# Patient Record
Sex: FEMALE | Race: WHITE | Employment: UNEMPLOYED | URBAN - METROPOLITAN AREA
[De-identification: names, ages, dates, MRNs, and addresses within clinical notes are randomized per-mention and may not be internally consistent; named-entity substitution may affect disease eponyms.]

---

## 2017-01-12 ENCOUNTER — OFFICE VISIT (OUTPATIENT)
Dept: FAMILY MEDICINE CLINIC | Age: 57
End: 2017-01-12

## 2017-01-12 VITALS
TEMPERATURE: 98.3 F | OXYGEN SATURATION: 98 % | BODY MASS INDEX: 17.49 KG/M2 | DIASTOLIC BLOOD PRESSURE: 88 MMHG | HEIGHT: 65 IN | SYSTOLIC BLOOD PRESSURE: 128 MMHG | WEIGHT: 105 LBS | HEART RATE: 76 BPM

## 2017-01-12 DIAGNOSIS — F41.1 GAD (GENERALIZED ANXIETY DISORDER): Primary | ICD-10-CM

## 2017-01-12 NOTE — PROGRESS NOTES
Coordination of Care  1. Have you been to the ER, urgent care clinic since your last visit? Hospitalized since your last visit? No    2. Have you seen or consulted any other health care providers outside of the [facility] since your last visit? Include any pap smears or colon screening. No    Medications  Medication Reconciliation Performed: yes  Patient does need refills                                                                                                                                                                                                                                   Learning Assessment Complete?  yes

## 2017-01-12 NOTE — PROGRESS NOTES
Subjective:      Kellee Leon is a 64 y.o. female who presents for follow up of of depression and anxiety. She complains of weight loss and psychomotor agitation, not really changed since the last visit. Phil Ryder  Continues to care for brother with Down's/Alzheimers as primary caretaker. Has good support from her  and a friend who is also caretaker. Other stressors include her daughter who has just regained custody of Julee's grandchildren and who [place]ana High thinks is using drugs and not caring for the children. She keeps in close touch with the woman who was foster mother to the kids and obtains a lot of support from her. We discussed again today psychiatry eval for new meds that might help her level of anxiety and going to a yoga class. She does do yoga at home, and refuses to see a psychiatrist, thinks the clonazepam provides all of the med help she needs  Pill count in her bottle today showed there were only about 20 pills left in the bottle she had filled in early jan. She isn't sure if she put the other pills in another container and will check when she gets home.       Problem List:     Patient Active Problem List    Diagnosis Date Noted    Chest pain 05/02/2013    Palpitations 05/02/2013    Tympanic membrane rupture 03/20/2013    Long Q-T syndrome 03/20/2013    WPW (Fazal-Parkinson-White syndrome) 03/20/2013    Panic disorder with agoraphobia 03/20/2013    Generalized anxiety disorder 03/20/2013    Migraine 03/20/2013    Hyperlipidemia 03/20/2013    Osteoporosis 03/20/2013     Medical History:     Past Medical History   Diagnosis Date    History of mammography, screening 2012     Normal    Pap smear for cervical cancer screening several years      Medications:     Current Outpatient Prescriptions   Medication Sig    clonazePAM (KLONOPIN) 0.5 mg tablet 1.5 in the am and 1.5 at hs    nitroglycerin (NITROSTAT) 0.4 mg SL tablet 1 Tab by SubLINGual route every five (5) minutes as needed for Chest Pain. For 2 doses. If cp is unrelieved after second dose call 911.  simvastatin (ZOCOR) 20 mg tablet Take 1 Tab by mouth nightly.  cyclobenzaprine (FLEXERIL) 10 mg tablet TAKE ONE TO ONE AND ONE-HALF TABLET BY MOUTH ONCE DAILY AS NEEDED FOR HEADACHE    albuterol (PROVENTIL HFA, VENTOLIN HFA, PROAIR HFA) 90 mcg/actuation inhaler Take 1 Puff by inhalation every six (6) hours as needed for Wheezing. No current facility-administered medications for this visit. Social History:     Social History     Social History    Marital status: SINGLE     Spouse name: N/A    Number of children: N/A    Years of education: N/A     Social History Main Topics    Smoking status: Current Every Day Smoker     Packs/day: 0.75     Years: 25.00     Types: Cigarettes    Smokeless tobacco: Never Used      Comment: down to 8 a day    Alcohol use No    Drug use: No    Sexual activity: Not Asked     Other Topics Concern    None     Social History Narrative         Objective:     Visit Vitals    /88 (BP 1 Location: Left arm, BP Patient Position: Sitting)    Pulse 76    Temp 98.3 °F (36.8 °C) (Oral)    Ht 5' 4.57\" (1.64 m)    Wt 105 lb (47.6 kg)    SpO2 98%    BMI 17.71 kg/m2        General:  alert, cooperative, mild distress, appears stated age   Thyroid: **nl     Neuro: grosely nonfocal   Mini Mental Status:  ND   Affect & Behavior:  {tearful at times, no change from usual        Assessment/ Plan   Anxiety with depression, situational.  I will continue the clonazepam, but I am not going to cover the med if she runs out for an unknown reason. F/u 2 months. We discussed breathing to help the anxiety, getting out of the house for short periods, and considering a yoga or guided imagery class.

## 2017-02-27 RX ORDER — CLONAZEPAM 0.5 MG/1
TABLET ORAL
Qty: 90 TAB | Refills: 2 | Status: CANCELLED | OUTPATIENT
Start: 2017-02-27

## 2017-02-27 NOTE — TELEPHONE ENCOUNTER
Pt called asking for Klonopin refill. She will run out before her appointment on 3/23. She said to tell Dr Wilfredo Arreaga that she is doing really well.

## 2017-02-28 RX ORDER — CLONAZEPAM 0.5 MG/1
TABLET ORAL
Qty: 90 TAB | Refills: 2 | OUTPATIENT
Start: 2017-02-28 | End: 2017-06-08 | Stop reason: SDUPTHER

## 2017-03-09 ENCOUNTER — TELEPHONE (OUTPATIENT)
Dept: FAMILY MEDICINE CLINIC | Age: 57
End: 2017-03-09

## 2017-03-09 NOTE — TELEPHONE ENCOUNTER
Patient called to report she thinks her thyroid is acting up, feels very tired, no energy at all, she thinks she needs her thyroid med restarted again. She has appt 3/23, she refuses to see other provider besides Dr Johnnie Artis and there are no sooner appts available to see Dr Johnnie Artis.

## 2017-03-10 NOTE — TELEPHONE ENCOUNTER
Per Dr Marge James, spoke to patient and asked her to wait until her appt on 3/23 to discuss her energy level/fatigue/thyroid with Dr. Marge James. Also updating Natasha, patient's mobile # is no longer in use, she confirms she only uses H #.

## 2017-03-23 ENCOUNTER — OFFICE VISIT (OUTPATIENT)
Dept: FAMILY MEDICINE CLINIC | Age: 57
End: 2017-03-23

## 2017-03-23 ENCOUNTER — HOSPITAL ENCOUNTER (OUTPATIENT)
Dept: LAB | Age: 57
Discharge: HOME | End: 2017-03-23

## 2017-03-23 VITALS
TEMPERATURE: 97.8 F | SYSTOLIC BLOOD PRESSURE: 143 MMHG | HEART RATE: 84 BPM | WEIGHT: 106 LBS | DIASTOLIC BLOOD PRESSURE: 82 MMHG | BODY MASS INDEX: 17.88 KG/M2

## 2017-03-23 DIAGNOSIS — E78.00 HYPERCHOLESTEROLEMIA: ICD-10-CM

## 2017-03-23 LAB — TSH SERPL DL<=0.05 MIU/L-ACNC: 4.57 UIU/ML (ref 0.36–3.74)

## 2017-03-23 PROCEDURE — 84443 ASSAY THYROID STIM HORMONE: CPT | Performed by: FAMILY MEDICINE

## 2017-03-23 RX ORDER — ALBUTEROL SULFATE 90 UG/1
1 AEROSOL, METERED RESPIRATORY (INHALATION)
Qty: 1 INHALER | Refills: 1 | Status: SHIPPED | OUTPATIENT
Start: 2017-03-23 | End: 2017-06-19 | Stop reason: SDUPTHER

## 2017-03-23 RX ORDER — CYCLOBENZAPRINE HCL 10 MG
TABLET ORAL
Qty: 30 TAB | Refills: 2 | Status: SHIPPED | OUTPATIENT
Start: 2017-03-23 | End: 2018-02-22 | Stop reason: SDUPTHER

## 2017-03-23 RX ORDER — SIMVASTATIN 20 MG/1
20 TABLET, FILM COATED ORAL
Qty: 30 TAB | Refills: 5 | Status: SHIPPED | OUTPATIENT
Start: 2017-03-23 | End: 2017-11-29 | Stop reason: SDUPTHER

## 2017-03-23 NOTE — PROGRESS NOTES
Coordination of Care  1. Have you been to the ER, urgent care clinic since your last visit? Hospitalized since your last visit? No    2. Have you seen or consulted any other health care providers outside of the [address] [place] [address] since your last visit? Include any pap smears or colon screening. No    Medications  Medication Reconciliation Performed: yes  Patient does need refills     Learning Assessment Complete?  yes

## 2017-03-23 NOTE — PROGRESS NOTES
HISTORY OF PRESENT ILLNESS  Sonia Ellis is a 62 y.o. female. HPI Comments: Feeling sluggish but mood is better. Having more palpitations than usual, stabbing sscp not necessarily exertional.  Wants other ideas on how to quit smoking. Doesn't qualify for PAP programs and has tried nicoretty gum, the patch, wellbutriin. Dad  of lung ca age 58. Can't afford niotine spray or chantix (we checked the cost on goodrx today-- over $300). Never started the zocor after last visit, says she didn't get the message from us that she neede to. Taking meds as directed. Uses flexeril prn h/a. Refuses mammo still. Follow-up         ROS    Physical Exam   Constitutional: She appears well-developed and well-nourished. No distress. Wt about the same. HENT:   Right TM perf unchanged and no masses in middle/inner ear. Neck: No thyromegaly present. Cardiovascular: Normal rate, regular rhythm and normal heart sounds. Exam reveals no gallop and no friction rub. No murmur heard. 1 PAC or PVC while I was listening that she felt as a palpitation. Pulmonary/Chest: Effort normal.   Breasts/axilla without masses. Abdominal: Soft. She exhibits no mass. There is no tenderness. Lymphadenopathy:     She has no cervical adenopathy. EKG-- short DE, borderline long QT, unchanged from previous. ASSESSMENT and PLAN  H/o mild hypothyroidism, recheck tsh.  sscp-- check stress echo. Tobacco abuse-- to try otc lozenges. Hyperlipidemia-- start zocor. Muscular h/a-- flexeril.   Anxiety, still has 2 refills on clonapzepam.

## 2017-03-23 NOTE — MR AVS SNAPSHOT
Visit Information Date & Time Provider Department Dept. Phone Encounter #  
 3/23/2017 10:30 AM Tim Fontanez, [address] [phone] [id number] Upcoming Health Maintenance Date Due Hepatitis C Screening 1960 FOBT Q 1 YEAR AGE 50-75 1/27/2010 BREAST CANCER SCRN MAMMOGRAM 5/14/2014 DTaP/Tdap/Td series (2 - Td) 11/10/2026 Allergies as of 3/23/2017  Review Complete On: 3/23/2017 By: Luan Newman Severity Noted Reaction Type Reactions Bacitracin  05/02/2013    Swelling Bactrim [Sulfamethoprim Ds]  10/01/2015    Rash Ciprofloxacin  03/20/2013   Not Verified Swelling Fosamax [Alendronate]  07/10/2014   Side Effect Nausea Only Imitrex [Sumatriptan Succinate]  03/20/2013   Side Effect Other (comments) Chest pain Keflex [Cephalexin]  10/01/2015    Nausea and Vomiting Pcn [Penicillins]  03/20/2013   Systemic Swelling Trazodone  05/14/2014    Other (comments) Nightmares Current Immunizations  Reviewed on 11/10/2016 Name Date Influenza Vaccine 10/12/2012, 10/10/2011, 11/12/2010 Influenza Vaccine (Quad) PF 11/10/2016, 11/13/2015, 11/14/2014, 11/8/2013 Pneumococcal Polysaccharide (PPSV-23) 11/10/2016 Tdap 11/10/2016 Not reviewed this visit You Were Diagnosed With   
  
 Codes Comments Hypercholesterolemia     ICD-10-CM: E78.00 ICD-9-CM: 272.0 Vitals BP Pulse Temp Weight(growth percentile) BMI OB Status 143/82 (BP 1 Location: Left arm, BP Patient Position: Sitting) 84 97.8 °F (36.6 °C) (Oral) 106 lb (48.1 kg) 17.88 kg/m2 Hysterectomy Smoking Status Current Every Day Smoker Vitals History BMI and BSA Data Body Mass Index Body Surface Area  
 17.88 kg/m 2 1.48 m 2 Preferred Pharmacy Pharmacy Name Phone [facility] [address] Your Updated Medication List  
  
   
 This list is accurate as of: 3/23/17 11:09 AM.  Always use your most recent med list.  
  
  
  
  
 * albuterol 90 mcg/actuation inhaler Commonly known as:  PROVENTIL HFA, VENTOLIN HFA, PROAIR HFA Take 1 Puff by inhalation every six (6) hours as needed for Wheezing. * albuterol 90 mcg/actuation inhaler Commonly known as:  PROVENTIL HFA, VENTOLIN HFA, PROAIR HFA Take 1 Puff by inhalation every six (6) hours as needed for Wheezing. clonazePAM 0.5 mg tablet Commonly known as:  KlonoPIN  
1.5 in the am and 1.5 at hs  
  
 cyclobenzaprine 10 mg tablet Commonly known as:  FLEXERIL  
TAKE ONE TO ONE AND ONE-HALF TABLET BY MOUTH ONCE DAILY AS NEEDED FOR HEADACHE  
  
 nitroglycerin 0.4 mg SL tablet Commonly known as:  NITROSTAT  
1 Tab by SubLINGual route every five (5) minutes as needed for Chest Pain. For 2 doses. If cp is unrelieved after second dose call 911. simvastatin 20 mg tablet Commonly known as:  ZOCOR Take 1 Tab by mouth nightly. * Notice: This list has 2 medication(s) that are the same as other medications prescribed for you. Read the directions carefully, and ask your doctor or other care provider to review them with you. Prescriptions Sent to Pharmacy Refills  
 cyclobenzaprine (FLEXERIL) 10 mg tablet 2 Sig: TAKE ONE TO ONE AND ONE-HALF TABLET BY MOUTH ONCE DAILY AS NEEDED FOR HEADACHE Class: Normal  
 Pharmacy: [address] [place] [address] Ph #: [phone]  
 simvastatin (ZOCOR) 20 mg tablet 5 Sig: Take 1 Tab by mouth nightly. Class: Normal  
 Pharmacy: [address] [place] [address] Ph #: [phone] Route: Oral  
 albuterol (PROVENTIL HFA, VENTOLIN HFA, PROAIR HFA) 90 mcg/actuation inhaler 1 Sig: Take 1 Puff by inhalation every six (6) hours as needed for Wheezing.   
 Class: Normal  
 Pharmacy: [address] Ph #: [phone] Route: Inhalation We Performed the Following AMB POC EKG ROUTINE W/ 12 LEADS, INTER & REP [21672 CPT(R)] Patient Instructions Deciding About Using Medicines To Quit Smoking What are the medicines you can use? Your doctor may prescribe varenicline (Chantix) or bupropion (Zyban). These medicines can help you cope with cravings for tobacco. They are pills that don't contain nicotine. You also can use nicotine replacement products. These do contain nicotine. There are many types. · Gum and lozenges slowly release nicotine into your mouth. · Patches stick to your skin. They slowly release nicotine into your bloodstream. 
· An inhaler has a york that contains nicotine. You breathe in a puff of nicotine vapor through your mouth and throat. · Nasal spray releases a mist that contains nicotine. What are key points about this decision? · Using medicines can double your chances of quitting smoking. They can ease cravings and withdrawal symptoms. · Getting counseling along with using medicine can raise your chances of quitting even more. · If you smoke fewer than 5 cigarettes a day, you may not need medicines to help you quit smoking. · These medicines have less nicotine than cigarettes. And by itself, nicotine is not nearly as harmful as smoking. The tars, carbon monoxide, and other toxic chemicals in tobacco cause the harmful effects. · The side effects of nicotine replacement products depend on the type of product. For example, a patch can make your skin red and itchy. Medicines in pill form can make you sick to your stomach. They can also cause dry mouth and trouble sleeping. For most people, the side effects are not bad enough to make them stop using the products. · FDA warning. The U.[place].  Food and Drug Administration (FDA) warns that people who are taking bupropion or varenicline and who have any serious or unusual changes in mood or behavior or who feel like hurting themselves or someone else should stop taking the medicine and call a doctor right away. If you already have a mood or behavior problem, be sure to tell your doctor before you decide to use these medicines. Why might you choose to use medicines to quit smoking? · You have tried on your own to stop smoking, but you were not able to stop. · You smoke more than 5 cigarettes a day. · You want to increase your chances of quitting smoking. · You want to reduce your cravings and withdrawal symptoms. · You feel the benefits of medicine outweigh the side effects. Why might you choose not to use medicine? · You want to try quitting on your own by stopping all at once (\"cold turkey\"). · You want to cut back slowly on the number of cigarettes you smoke. · You smoke fewer than 5 cigarettes a day. · You do not like using medicine. · You feel the side effects of medicines outweigh the benefits. · You are worried about the cost of medicines. Your decision Thinking about the facts and your feelings can help you make a decision that is right for you. Be sure you understand the benefits and risks of your options, and think about what else you need to do before you make the decision. Where can you learn more? Go to http://vinita-jade.info/. Enter W702 in the search box to learn more about \"Deciding About Using Medicines To Quit Smoking. \" Current as of: May 26, 2016 Content Version: 11.1 © 5042-0296 [employer]. Care instructions adapted under license by [employer] [facility] [employer] (which disclaims liability or warranty for this information). If you have questions about a medical condition or this instruction, always ask your healthcare professional. [address] any warranty or liability for your use of this information. Stopping Smoking: Care Instructions Your Care Instructions Cigarette smokers crave the nicotine in cigarettes. Giving it up is much harder than simply changing a habit. Your body has to stop craving the nicotine. It is hard to quit, but you can do it. There are many tools that people use to quit smoking. You may find that combining tools works best for you. There are several steps to quitting. First you get ready to quit. Then you get support to help you. After that, you learn new skills and behaviors to become a nonsmoker. For many people, a necessary step is getting and using medicine. Your doctor will help you set up the plan that best meets your needs. You may want to attend a smoking cessation program to help you quit smoking. When you choose a program, look for one that has proven success. Ask your doctor for ideas. You will greatly increase your chances of success if you take medicine as well as get counseling or join a cessation program. 
Some of the changes you feel when you first quit tobacco are uncomfortable. Your body will miss the nicotine at first, and you may feel short-tempered and grumpy. You may have trouble sleeping or concentrating. Medicine can help you deal with these symptoms. You may struggle with changing your smoking habits and rituals. The last step is the tricky one: Be prepared for the smoking urge to continue for a time. This is a lot to deal with, but keep at it. You will feel better. Follow-up care is a key part of your treatment and safety. Be sure to make and go to all appointments, and call your doctor if you are having problems. Its also a good idea to know your test results and keep a list of the medicines you take. How can you care for yourself at home? · Ask your family, friends, and coworkers for support. You have a better chance of quitting if you have help and support. · Join a support group, such as Nicotine Anonymous, for people who are trying to quit smoking. · Consider signing up for a smoking cessation program, such as the American Lung Association's Freedom from Smoking program. 
· Set a quit date. Pick your date carefully so that it is not right in the middle of a big deadline or stressful time. Once you quit, do not even take a puff. Get rid of all ashtrays and lighters after your last cigarette. Clean your house and your clothes so that they do not smell of smoke. · Learn how to be a nonsmoker. Think about ways you can avoid those things that make you reach for a cigarette. ¨ Avoid situations that put you at greatest risk for smoking. For some people, it is hard to have a drink with friends without smoking. For others, they might skip a coffee break with coworkers who smoke. ¨ Change your daily routine. Take a different route to work or eat a meal in a different place. · Cut down on stress. Calm yourself or release tension by doing an activity you enjoy, such as reading a book, taking a hot bath, or gardening. · Talk to your doctor or pharmacist about nicotine replacement therapy, which replaces the nicotine in your body. You still get nicotine but you do not use tobacco. Nicotine replacement products help you slowly reduce the amount of nicotine you need. These products come in several forms, many of them available over-the-counter: ¨ Nicotine patches ¨ Nicotine gum and lozenges ¨ Nicotine inhaler · Ask your doctor about bupropion (Wellbutrin) or varenicline (Chantix), which are prescription medicines. They do not contain nicotine. They help you by reducing withdrawal symptoms, such as stress and anxiety. · Some people find hypnosis, acupuncture, and massage helpful for ending the smoking habit. · Eat a healthy diet and get regular exercise. Having healthy habits will help your body move past its craving for nicotine. · Be prepared to keep trying.  Most people are not successful the first few times they try to quit. Do not get mad at yourself if you smoke again. Make a list of things you learned and think about when you want to try again, such as next week, next month, or next year. Where can you learn more? Go to http://vinita-jade.info/. Enter N615 in the search box to learn more about \"Stopping Smoking: Care Instructions. \" Current as of: May 26, 2016 Content Version: 11.1 © 8061-6671 [employer]. Care instructions adapted under license by [employer] (which disclaims liability or warranty for this information). If you have questions about a medical condition or this instruction, always ask your healthcare professional. [address] any warranty or liability for your use of this information. Buy the 4mg nicotine lozenges and use one every 1-2 hours instead of smoking for 6 weeks, then taper off over 6-12 weeks. No more than about 16 lozenges in a day. Introducing [facility]! Shaheen Rosado introduces Locately patient portal. Now you can access parts of your medical record, email your doctor's office, and request medication refills online. 1. In your internet browser, go to [email or web address] 2. Click on the First Time User? Click Here link in the Sign In box. You will see the New Member Sign Up page. 3. Enter your Locately Access Code exactly as it appears below. You will not need to use this code after youve completed the sign-up process. If you do not sign up before the expiration date, you must request a new code. · Locately Access Code: [email or web address] Expires: 6/21/2017 11:08 AM 
 
4. Enter the last four digits of your Social Security Number (xxxx) and Date of Birth (mm/dd/yyyy) as indicated and click Submit. You will be taken to the next sign-up page. 5. Create a [employer] ID.  This will be your Locately login ID and cannot be changed, so think of one that is secure and easy to remember. 6. Create a [email or web address] password. You can change your password at any time. 7. Enter your Password Reset Question and Answer. This can be used at a later time if you forget your password. 8. Enter your e-mail address. You will receive e-mail notification when new information is available in [address] 9. Click Sign Up. You can now view and download portions of your medical record. 10. Click the Download Summary menu link to download a portable copy of your medical information. If you have questions, please visit the Frequently Asked Questions section of the [email or web address] website. Remember, [email or web address] is NOT to be used for urgent needs. For medical emergencies, dial 911. Now available from your iPhone and Android! Please provide this summary of care documentation to your next provider. Your primary care clinician is listed as Dalia Wilson. If you have any questions after today's visit, please call [phone].

## 2017-03-23 NOTE — PATIENT INSTRUCTIONS
Deciding About Using Medicines To Quit Smoking  What are the medicines you can use? Your doctor may prescribe varenicline (Chantix) or bupropion (Zyban). These medicines can help you cope with cravings for tobacco. They are pills that don't contain nicotine. You also can use nicotine replacement products. These do contain nicotine. There are many types. · Gum and lozenges slowly release nicotine into your mouth. · Patches stick to your skin. They slowly release nicotine into your bloodstream.  · An inhaler has a york that contains nicotine. You breathe in a puff of nicotine vapor through your mouth and throat. · Nasal spray releases a mist that contains nicotine. What are key points about this decision? · Using medicines can double your chances of quitting smoking. They can ease cravings and withdrawal symptoms. · Getting counseling along with using medicine can raise your chances of quitting even more. · If you smoke fewer than 5 cigarettes a day, you may not need medicines to help you quit smoking. · These medicines have less nicotine than cigarettes. And by itself, nicotine is not nearly as harmful as smoking. The tars, carbon monoxide, and other toxic chemicals in tobacco cause the harmful effects. · The side effects of nicotine replacement products depend on the type of product. For example, a patch can make your skin red and itchy. Medicines in pill form can make you sick to your stomach. They can also cause dry mouth and trouble sleeping. For most people, the side effects are not bad enough to make them stop using the products. · FDA warning. The U.S. Food and Drug Administration (FDA) warns that people who are taking bupropion or varenicline and who have any serious or unusual changes in mood or behavior or who feel like hurting themselves or someone else should stop taking the medicine and call a doctor right away.  If you already have a mood or behavior problem, be sure to tell your doctor before you decide to use these medicines. Why might you choose to use medicines to quit smoking? · You have tried on your own to stop smoking, but you were not able to stop. · You smoke more than 5 cigarettes a day. · You want to increase your chances of quitting smoking. · You want to reduce your cravings and withdrawal symptoms. · You feel the benefits of medicine outweigh the side effects. Why might you choose not to use medicine? · You want to try quitting on your own by stopping all at once (\"cold turkey\"). · You want to cut back slowly on the number of cigarettes you smoke. · You smoke fewer than 5 cigarettes a day. · You do not like using medicine. · You feel the side effects of medicines outweigh the benefits. · You are worried about the cost of medicines. Your decision  Thinking about the facts and your feelings can help you make a decision that is right for you. Be sure you understand the benefits and risks of your options, and think about what else you need to do before you make the decision. Where can you learn more? Go to http://vinita-jade.info/. Enter B501 in the search box to learn more about \"Deciding About Using Medicines To Quit Smoking. \"  Current as of: May 26, 2016  Content Version: 11.1  © 0973-6926 [employer]. Care instructions adapted under license by [employer] (which disclaims liability or warranty for this information). If you have questions about a medical condition or this instruction, always ask your healthcare professional. [place] [address] any warranty or liability for your use of this information. Stopping Smoking: Care Instructions  Your Care Instructions  Cigarette smokers crave the nicotine in cigarettes. Giving it up is much harder than simply changing a habit. Your body has to stop craving the nicotine. It is hard to quit, but you can do it.  There are many tools that people use to quit smoking. You may find that combining tools works best for you. There are several steps to quitting. First you get ready to quit. Then you get support to help you. After that, you learn new skills and behaviors to become a nonsmoker. For many people, a necessary step is getting and using medicine. Your doctor will help you set up the plan that best meets your needs. You may want to attend a smoking cessation program to help you quit smoking. When you choose a program, look for one that has proven success. Ask your doctor for ideas. You will greatly increase your chances of success if you take medicine as well as get counseling or join a cessation program.  Some of the changes you feel when you first quit tobacco are uncomfortable. Your body will miss the nicotine at first, and you may feel short-tempered and grumpy. You may have trouble sleeping or concentrating. Medicine can help you deal with these symptoms. You may struggle with changing your smoking habits and rituals. The last step is the tricky one: Be prepared for the smoking urge to continue for a time. This is a lot to deal with, but keep at it. You will feel better. Follow-up care is a key part of your treatment and safety. Be sure to make and go to all appointments, and call your doctor if you are having problems. Its also a good idea to know your test results and keep a list of the medicines you take. How can you care for yourself at home? · Ask your family, friends, and coworkers for support. You have a better chance of quitting if you have help and support. · Join a support group, such as Nicotine Anonymous, for people who are trying to quit smoking. · Consider signing up for a smoking cessation program, such as the American Lung Association's Freedom from Smoking program.  · Set a quit date. Pick your date carefully so that it is not right in the middle of a big deadline or stressful time. Once you quit, do not even take a puff.  Get rid of all ashtrays and lighters after your last cigarette. Clean your house and your clothes so that they do not smell of smoke. · Learn how to be a nonsmoker. Think about ways you can avoid those things that make you reach for a cigarette. ¨ Avoid situations that put you at greatest risk for smoking. For some people, it is hard to have a drink with friends without smoking. For others, they might skip a coffee break with coworkers who smoke. ¨ Change your daily routine. Take a different route to work or eat a meal in a different place. · Cut down on stress. Calm yourself or release tension by doing an activity you enjoy, such as reading a book, taking a hot bath, or gardening. · Talk to your doctor or pharmacist about nicotine replacement therapy, which replaces the nicotine in your body. You still get nicotine but you do not use tobacco. Nicotine replacement products help you slowly reduce the amount of nicotine you need. These products come in several forms, many of them available over-the-counter:  ¨ Nicotine patches  ¨ Nicotine gum and lozenges  ¨ Nicotine inhaler  · Ask your doctor about bupropion (Wellbutrin) or varenicline (Chantix), which are prescription medicines. They do not contain nicotine. They help you by reducing withdrawal symptoms, such as stress and anxiety. · Some people find hypnosis, acupuncture, and massage helpful for ending the smoking habit. · Eat a healthy diet and get regular exercise. Having healthy habits will help your body move past its craving for nicotine. · Be prepared to keep trying. Most people are not successful the first few times they try to quit. Do not get mad at yourself if you smoke again. Make a list of things you learned and think about when you want to try again, such as next week, next month, or next year. Where can you learn more? Go to http://vinita-jade.info/.   Enter W582 in the search box to learn more about \"Stopping Smoking: Care Instructions. \"  Current as of: May 26, 2016  Content Version: 11.1  © 5010-1031 [employer], [facility]. Care instructions adapted under license by [employer] (which disclaims liability or warranty for this information). If you have questions about a medical condition or this instruction, always ask your healthcare professional. [address] any warranty or liability for your use of this information. Buy the 4mg nicotine lozenges and use one every 1-2 hours instead of smoking for 6 weeks, then taper off over 6-12 weeks. No more than about 16 lozenges in a day.

## 2017-03-24 PROBLEM — E03.9 ACQUIRED HYPOTHYROIDISM: Status: ACTIVE | Noted: 2017-03-24

## 2017-03-24 RX ORDER — LEVOTHYROXINE SODIUM 25 UG/1
25 TABLET ORAL
Qty: 30 TAB | Refills: 5 | Status: SHIPPED | OUTPATIENT
Start: 2017-03-24 | End: 2017-12-07 | Stop reason: SINTOL

## 2017-03-29 ENCOUNTER — TELEPHONE (OUTPATIENT)
Dept: FAMILY MEDICINE CLINIC | Age: 57
End: 2017-03-29

## 2017-03-29 NOTE — TELEPHONE ENCOUNTER
Pt will like to start Chantix. [facility] to [facility] on [address] [place] [address] location.

## 2017-03-29 NOTE — PROGRESS NOTES
Call made to pt with lab results. Pt has started medication. States she has no questions and will follow up in one month.

## 2017-03-31 ENCOUNTER — HOSPITAL ENCOUNTER (OUTPATIENT)
Dept: NON INVASIVE DIAGNOSTICS | Age: 57
Discharge: HOME | End: 2017-03-31
Attending: FAMILY MEDICINE
Payer: SELF-PAY

## 2017-03-31 DIAGNOSIS — E78.00 HYPERCHOLESTEROLEMIA: ICD-10-CM

## 2017-03-31 LAB
ATTENDING PHYSICIAN, CST07: NORMAL
DIAGNOSIS, 93000: NORMAL
DUKE TM SCORE RESULT, CST14: NORMAL
DUKE TREADMILL SCORE, CST13: NORMAL
ECG INTERP BEFORE EX, CST11: NORMAL
ECG INTERP DURING EX, CST12: NORMAL
FUNCTIONAL CAPACITY, CST17: NORMAL
KNOWN CARDIAC CONDITION, CST08: NORMAL
MAX. DIASTOLIC BP, CST04: 75 MMHG
MAX. HEART RATE, CST05: 142 BPM
MAX. SYSTOLIC BP, CST03: 132 MMHG
OVERALL BP RESPONSE TO EXERCISE, CST16: NORMAL
OVERALL HR RESPONSE TO EXERCISE, CST15: NORMAL
PEAK EX METS, CST10: 7 METS
PROTOCOL NAME, CST01: NORMAL
TEST INDICATION, CST09: NORMAL

## 2017-03-31 PROCEDURE — 93312 ECHO TRANSESOPHAGEAL: CPT

## 2017-03-31 RX ORDER — VARENICLINE TARTRATE 25 MG
KIT ORAL
Qty: 1 DOSE PACK | Refills: 0 | Status: SHIPPED | OUTPATIENT
Start: 2017-03-31 | End: 2017-12-07

## 2017-03-31 NOTE — TELEPHONE ENCOUNTER
Per financial info in media pt would qualify. Financial screening info faxed to [facility]. Please forward rx to [facility].

## 2017-04-03 NOTE — TELEPHONE ENCOUNTER
Spoke to the patient - gave her the message below from Dr Cheryl Gaona you let her know I sent rx for chantix to [facility], and that she needs to watch for increasing depression while on this, occas can cause that.  Main s.e. Of chantix is vivid dreams. Does trung know she wants to  at [facility]. Location?    Kandi Renner \"    Sent note to Trung advising them Rx will be picked up from [address] [place] [address] location    Discussed need to watch for increased depression and vivid dreams - to call the clinic with concerns

## 2017-04-06 ENCOUNTER — TELEPHONE (OUTPATIENT)
Dept: FAMILY MEDICINE CLINIC | Age: 57
End: 2017-04-06

## 2017-04-20 ENCOUNTER — OFFICE VISIT (OUTPATIENT)
Dept: FAMILY MEDICINE CLINIC | Age: 57
End: 2017-04-20

## 2017-04-20 VITALS
SYSTOLIC BLOOD PRESSURE: 107 MMHG | BODY MASS INDEX: 18.05 KG/M2 | TEMPERATURE: 98.1 F | WEIGHT: 107 LBS | DIASTOLIC BLOOD PRESSURE: 78 MMHG | HEART RATE: 81 BPM

## 2017-04-20 DIAGNOSIS — Z72.0 TOBACCO ABUSE: Primary | ICD-10-CM

## 2017-04-20 RX ORDER — ASPIRIN 81 MG/1
81 TABLET ORAL DAILY
Qty: 1 TAB | Refills: 0 | Status: SHIPPED | COMMUNITY
Start: 2017-04-20 | End: 2019-03-28

## 2017-04-20 RX ORDER — VARENICLINE TARTRATE 1 MG/1
TABLET, FILM COATED ORAL
Qty: 60 TAB | Refills: 1 | Status: SHIPPED | OUTPATIENT
Start: 2017-04-20 | End: 2017-07-19

## 2017-04-20 NOTE — PROGRESS NOTES
HISTORY OF PRESENT ILLNESS  Chrissie Nieto is a 62 y.o. female. HPI Comments: Negative stress echo. Has been on chantix about 2 weeks now and feels sure it is helping her stop tobacco use. Is down to 7 cigarettes daily, is also using some e-cigarettes though. Is quitting with a friend. Has been irritable but not depressed, no strange or disturbing dreams. Taking meds as directed, brought them in and has refills on all except the Chantix. Cholesterol Problem         ROS    Physical Exam   Constitutional: She appears well-developed and well-nourished. No distress. Wt. Up a lb. ASSESSMENT and PLAN  No clinical CAD but at risk for development. Continue statin, start asa 81mg daily, and continue Chantix for another 1-2 months. Hasn't had f/u for WPW/preexcitation syndrome for many years, and no sx of palpitations or syncope. Will ask Dr. Timothy Harrison if she needs cardiology eval.  Anxiety, stable. Hasn't accelerated in clonazepam use so will continue. Mild hyppothyroidism, continue low dose levothyroxine.

## 2017-04-20 NOTE — PROGRESS NOTES
Coordination of Care  1. Have you been to the ER, urgent care clinic since your last visit? Hospitalized since your last visit? No    2. Have you seen or consulted any other health care providers outside of the [facility] since your last visit? Include any pap smears or colon screening. No    Medications  Medication Reconciliation Performed: yes  Patient does need refills     Learning Assessment Complete?  yes

## 2017-04-21 NOTE — PROGRESS NOTES
Discussed with Dr. Massey [place] over email, and in the absence of cardiac sx, he doesn't feel she needs cardiology eval.

## 2017-06-08 RX ORDER — CLONAZEPAM 0.5 MG/1
TABLET ORAL
Qty: 90 TAB | Refills: 0 | OUTPATIENT
Start: 2017-06-08 | End: 2017-07-10 | Stop reason: SDUPTHER

## 2017-06-08 NOTE — TELEPHONE ENCOUNTER
Refill called in to [facility] in [place]. She should seek additional refills from Dr. Sheila Sifuentes.

## 2017-06-08 NOTE — TELEPHONE ENCOUNTER
Pt left message on nurse line requesting refill of \"Klonopin, it is going to run out before my next visit. \" routing to providers.

## 2017-06-19 ENCOUNTER — OFFICE VISIT (OUTPATIENT)
Dept: FAMILY MEDICINE CLINIC | Age: 57
End: 2017-06-19

## 2017-06-19 VITALS
WEIGHT: 109 LBS | SYSTOLIC BLOOD PRESSURE: 131 MMHG | TEMPERATURE: 98.4 F | DIASTOLIC BLOOD PRESSURE: 87 MMHG | HEART RATE: 77 BPM | BODY MASS INDEX: 18.38 KG/M2

## 2017-06-19 DIAGNOSIS — F41.1 GAD (GENERALIZED ANXIETY DISORDER): Primary | ICD-10-CM

## 2017-06-19 RX ORDER — ALBUTEROL SULFATE 90 UG/1
1 AEROSOL, METERED RESPIRATORY (INHALATION)
Qty: 1 INHALER | Refills: 1 | Status: SHIPPED | OUTPATIENT
Start: 2017-06-19 | End: 2018-05-16

## 2017-06-19 NOTE — PROGRESS NOTES
Coordination of Care  1. Have you been to the ER, urgent care clinic since your last visit? Hospitalized since your last visit? No    2. Have you seen or consulted any other health care providers outside of the [address] [place] [address] since your last visit? Include any pap smears or colon screening. No    Medications  Medication Reconciliation Performed: yes  Patient does need refills     Learning Assessment Complete?  yes

## 2017-06-19 NOTE — PROGRESS NOTES
HISTORY OF PRESENT ILLNESS  Sarahi Jeffries is a 62 y.o. female. HPI Comments: F/u chronic generalized anxiety. Has been having shaking episodes at times lately, where extrem shake and she feels tingly from head to extrem. This doesn't occur in response to a stressor. Brother health related to the Alzheimer's continues to deteriorate and he has been needing to be fed lately. She thinks she is losing her part-time caregiver.  is still very helpful. Right ear has not been draining. She is starting month 2 of chantix and is down to 3 cigarettes/day, feels like the med is helping. Couldn't afford albuterol at pharmacy, wants it sent to [facility]. Follow-up     Medication Refill         ROS    Physical Exam   Constitutional: She appears well-developed and well-nourished. No distress. HENT:   Right TM perf looks the same, whitish area visible in inner ear, but doesn't appear to be a mass. Neck: No thyromegaly present. Cardiovascular: Normal rate, regular rhythm and normal heart sounds. Pulmonary/Chest: Breath sounds normal.   Musculoskeletal: She exhibits no edema. ASSESSMENT and PLAN  MIRANDA with panic attacks, discused breathing into a paper bag, and slow deep breaths for tx. Also discussed that the yoga and support group should help tx this. No change in clonazepam dose. Mild hypothyroidism, on low dose of l-thyroxine. Will check tsh next blood draw. Chronic right TM perf, no need for tx at this time. I tried to get her to go to ENT and she either didn't qualify for AN, or didn't want to go. HM-- she has refused mammo. Could offer hemoccults next visit. dexa 2013 showed osteoporosis and she refused bisphosphonates. Will discuss osteoporosis with her next visit.

## 2017-07-10 RX ORDER — CLONAZEPAM 0.5 MG/1
TABLET ORAL
Qty: 90 TAB | Refills: 0 | Status: SHIPPED | OUTPATIENT
Start: 2017-07-10 | End: 2017-07-27 | Stop reason: SDUPTHER

## 2017-07-10 NOTE — TELEPHONE ENCOUNTER
Pt called, she is very anxious as she took her last Klonopin pill this morning. Per Shilpi Palma # [phone] authorized and called in to [facility]. Asked pt to follow up in August as requested by Dr Jaswant Haney.

## 2017-07-27 ENCOUNTER — OFFICE VISIT (OUTPATIENT)
Dept: FAMILY MEDICINE CLINIC | Age: 57
End: 2017-07-27

## 2017-07-27 VITALS
TEMPERATURE: 98.7 F | SYSTOLIC BLOOD PRESSURE: 108 MMHG | HEART RATE: 84 BPM | BODY MASS INDEX: 17.99 KG/M2 | WEIGHT: 108 LBS | DIASTOLIC BLOOD PRESSURE: 78 MMHG | HEIGHT: 65 IN

## 2017-07-27 DIAGNOSIS — F41.1 GAD (GENERALIZED ANXIETY DISORDER): Primary | ICD-10-CM

## 2017-07-27 RX ORDER — CLONAZEPAM 0.5 MG/1
TABLET ORAL
Qty: 90 TAB | Refills: 1 | Status: SHIPPED | OUTPATIENT
Start: 2017-07-27 | End: 2017-10-10 | Stop reason: SDUPTHER

## 2017-07-27 NOTE — PROGRESS NOTES
HISTORY OF PRESENT ILLNESS  Luann Tran is a 62 y.o. female. HPI Comments: F/u MIRANDA with stressful home situation. Reports it's been a bad month. Brother with alzheimer's has gotten much worse, very agitated and needs to be fed, severe sundowning. His psychiatrist is seeing him frequently. The aide who relieves Arturo Richardson has been out quite a bit, so Arturo Richardson feels like she is on duty 24 hours a day.  continues to be very good support. Arturo Richardson has several people who she can talk to, but feels unable to get out to [employer] or to support group or out to exercise. Follow-up         ROS    Physical Exam   Constitutional:   Nervous, tearful at times. Psychiatric: Her behavior is normal. Judgment and thought content normal.       ASSESSMENT and PLAN  MIRANDA with severe stressors. I just let her talk, emphasized continued use of support system. F/u 2 months, earlier prn. Continue clonazepam at the same dose.

## 2017-07-27 NOTE — ACP (ADVANCE CARE PLANNING)
Honoring Choices folder given to patient and was invited to meet with Nurse navigator for further information.

## 2017-07-27 NOTE — PROGRESS NOTES
Patient told by  before leaving she needed to make an appointment to renew her financial screening, pt refused, stating she would call.

## 2017-07-27 NOTE — PROGRESS NOTES
Coordination of Care  1. Have you been to the ER, urgent care clinic since your last visit? Hospitalized since your last visit? No    2. Have you seen or consulted any other health care providers outside of the [address] [place] [address] since your last visit? Include any pap smears or colon screening. No    Medications  Medication Reconciliation Performed: no  Patient does need refills     Learning Assessment Complete? yes     Patient was given Honoring Choices folder today at her visit with Dr Dave Alarcon

## 2017-08-20 RX ORDER — CLONAZEPAM 0.5 MG/1
TABLET ORAL
Qty: 90 TAB | Refills: 0 | OUTPATIENT
Start: 2017-08-20

## 2017-08-21 NOTE — TELEPHONE ENCOUNTER
Call placed to patient, she said \"disregard the refill request for the clonazepam, I forgot Dr Clara Jung gave me one [script]\". RN clarified patient referring to the Rx clonazepam prescribed on 7/27. RN asked pt if she was okay, as it sounded as if patient was crying over phone, pt stated \" no I'm not, my brother has taken a turn for the worse. \" RN offered verbal support to patient pt stated she had some support, and closed out the phone call. No further questions or needs expressed by patient at time call ended.

## 2017-09-18 ENCOUNTER — TELEPHONE (OUTPATIENT)
Dept: FAMILY MEDICINE CLINIC | Age: 57
End: 2017-09-18

## 2017-09-18 NOTE — TELEPHONE ENCOUNTER
Telephone call received from the patient who states that her right ear has been draining since Saturday night (09-16-17). She states that Dr. Estrella Mercer knows all about her ear problems and that she doesn't want to see anyone but Dr. Estrella Mercer. She denied any other symptoms at this time. Explained that Dr. Estrella Mercer is not working today and that a phone message would be sent to her. The patient declined an acute appointment today and stated she will call first thing tomorrow morning, 09-19-17, for an acute appointment since Dr. Estrella Mercer is scheduled at [facility] for the morning. The patient's follow-up appointment is scheduled with Dr. Estrella Mercer on Thursday, 09-21-17. Routing to Dr. Estrella Mercer for information.  Miguel Kim RN

## 2017-09-21 ENCOUNTER — OFFICE VISIT (OUTPATIENT)
Dept: FAMILY MEDICINE CLINIC | Age: 57
End: 2017-09-21

## 2017-09-21 VITALS
WEIGHT: 103.6 LBS | SYSTOLIC BLOOD PRESSURE: 140 MMHG | HEART RATE: 76 BPM | BODY MASS INDEX: 17.47 KG/M2 | TEMPERATURE: 97.4 F | DIASTOLIC BLOOD PRESSURE: 81 MMHG

## 2017-09-21 DIAGNOSIS — F41.1 GAD (GENERALIZED ANXIETY DISORDER): Primary | ICD-10-CM

## 2017-09-21 DIAGNOSIS — H66.11 CHRONIC TUBOTYMPANIC DISEASE WITH ANTERIOR PERFORATION OF EAR DRUM, RIGHT: ICD-10-CM

## 2017-09-21 DIAGNOSIS — H72.91 CHRONIC TUBOTYMPANIC DISEASE WITH ANTERIOR PERFORATION OF EAR DRUM, RIGHT: ICD-10-CM

## 2017-09-21 NOTE — PROGRESS NOTES
HISTORY OF PRESENT ILLNESS  Quinten Zuniga is a 62 y.o. female. HPI Comments: F/u of anxiety. Also was having drainage from the right ear again which was white and unasst with pain, but it resolved yesterday. Did get another good health worker to help with her brother, who is in the end stages of alzheimer's. He is now sleeping a lot and Steve Chun is having a hard time dealing with the fact that he will die soon. Does have a hospice nurse who she is talking with and who she thinks helps her. Also is talking with her  and has good support from her . Has a feeling that things are closing in on her frequently, which she has had in the past.  Isn't going out much. Had a panic attack in [facility] the other day-- says she then sat down to breathe and the pharmacist came out to help her. Appetite is worse than usual, and so is sleep. Doesn't want to talk with one of our counselors about how to work through panic. Says she sometimes feels like she can't go on, is having more crying spells lately. mom  in sept years ago and has had other losses in the month of sept and attributes some of difficulty to that. Denies suicidal thoughts. .      Follow-up         ROS    Physical Exam   Constitutional: She appears well-developed and well-nourished. No distress. Wt down several lb. HENT:   Rupture in R TM is clean, no sign of inner ear dx, canal nl and TM otherwise nl. Psychiatric:   Mood and affect unchanged. ASSESSMENT and PLAN  MIRANDA with panic attacks, depression and severe situational stressors. Does have good support and uses it. We never found a SSRI or anxiety med other than benzo to help her and she hasn't accel in use, although she said today that she doesn't think the clonazepam is working any more. I didn't change the dose today, and she has a refill. Could consider increasing at next visit. Chronic right TM rupture without infection, no need for any tx today.   If develops sx of yellow d/c and pain, to call for abx.

## 2017-09-21 NOTE — PROGRESS NOTES
Coordination of Care  1. Have you been to the ER, urgent care clinic since your last visit? Hospitalized since your last visit? No    2. Have you seen or consulted any other health care providers outside of the [address] [place] [address] since your last visit? Include any pap smears or colon screening. No    Medications  Medication Reconciliation Performed: yes  Patient does not know need refills     Learning Assessment Complete?  yes

## 2017-10-10 RX ORDER — CLONAZEPAM 0.5 MG/1
TABLET ORAL
Qty: 90 TAB | Refills: 0 | OUTPATIENT
Start: 2017-10-10 | End: 2017-11-28 | Stop reason: SDUPTHER

## 2017-10-10 NOTE — TELEPHONE ENCOUNTER
Pt called she is not able to come to her appointment today as she cannot leave her brother Radha Late is not doing well\". She is requesting a refill of Clonazepam as she will run out. Rescheduled for 10/18 at 8.30 am.  Per Dr Stephens Section ok to call in refill, called in to [facility] with no refills.

## 2017-10-18 ENCOUNTER — HOSPITAL ENCOUNTER (OUTPATIENT)
Dept: LAB | Age: 57
Discharge: HOME | End: 2017-10-18

## 2017-10-18 ENCOUNTER — OFFICE VISIT (OUTPATIENT)
Dept: FAMILY MEDICINE CLINIC | Age: 57
End: 2017-10-18

## 2017-10-18 VITALS
HEART RATE: 85 BPM | TEMPERATURE: 97.7 F | DIASTOLIC BLOOD PRESSURE: 80 MMHG | BODY MASS INDEX: 17.37 KG/M2 | SYSTOLIC BLOOD PRESSURE: 126 MMHG | WEIGHT: 103 LBS

## 2017-10-18 DIAGNOSIS — Z23 ENCOUNTER FOR IMMUNIZATION: ICD-10-CM

## 2017-10-18 DIAGNOSIS — E03.9 HYPOTHYROIDISM, UNSPECIFIED TYPE: ICD-10-CM

## 2017-10-18 DIAGNOSIS — E03.9 HYPOTHYROIDISM, UNSPECIFIED TYPE: Primary | ICD-10-CM

## 2017-10-18 LAB
ALBUMIN SERPL-MCNC: 4 G/DL (ref 3.5–5)
ALBUMIN/GLOB SERPL: 1.4 {RATIO} (ref 1.1–2.2)
ALP SERPL-CCNC: 75 U/L (ref 45–117)
ALT SERPL-CCNC: 12 U/L (ref 12–78)
ANION GAP SERPL CALC-SCNC: 5 MMOL/L (ref 5–15)
AST SERPL-CCNC: 15 U/L (ref 15–37)
BILIRUB SERPL-MCNC: 0.2 MG/DL (ref 0.2–1)
BUN SERPL-MCNC: 8 MG/DL (ref 6–20)
BUN/CREAT SERPL: 13 (ref 12–20)
CALCIUM SERPL-MCNC: 8.6 MG/DL (ref 8.5–10.1)
CHLORIDE SERPL-SCNC: 109 MMOL/L (ref 97–108)
CHOLEST SERPL-MCNC: 189 MG/DL
CO2 SERPL-SCNC: 29 MMOL/L (ref 21–32)
CREAT SERPL-MCNC: 0.61 MG/DL (ref 0.55–1.02)
GLOBULIN SER CALC-MCNC: 2.8 G/DL (ref 2–4)
GLUCOSE SERPL-MCNC: 93 MG/DL (ref 65–100)
HDLC SERPL-MCNC: 50 MG/DL
HDLC SERPL: 3.8 {RATIO} (ref 0–5)
LDLC SERPL CALC-MCNC: 122.8 MG/DL (ref 0–100)
LIPID PROFILE,FLP: ABNORMAL
POTASSIUM SERPL-SCNC: 3.6 MMOL/L (ref 3.5–5.1)
PROT SERPL-MCNC: 6.8 G/DL (ref 6.4–8.2)
SODIUM SERPL-SCNC: 143 MMOL/L (ref 136–145)
TRIGL SERPL-MCNC: 81 MG/DL (ref ?–150)
TSH SERPL DL<=0.05 MIU/L-ACNC: 7.36 UIU/ML (ref 0.36–3.74)
VLDLC SERPL CALC-MCNC: 16.2 MG/DL

## 2017-10-18 PROCEDURE — 84443 ASSAY THYROID STIM HORMONE: CPT | Performed by: FAMILY MEDICINE

## 2017-10-18 PROCEDURE — 84439 ASSAY OF FREE THYROXINE: CPT | Performed by: FAMILY MEDICINE

## 2017-10-18 PROCEDURE — 80053 COMPREHEN METABOLIC PANEL: CPT | Performed by: FAMILY MEDICINE

## 2017-10-18 PROCEDURE — 80061 LIPID PANEL: CPT | Performed by: FAMILY MEDICINE

## 2017-10-18 NOTE — ACP (ADVANCE CARE PLANNING)
Invited to Denise interview. Pt is interested in being a DNR, has gone through ACP with her mom and brother and has been the [facility] for her brother and mom. Will scheduled her today.

## 2017-10-18 NOTE — PROGRESS NOTES
Coordination of Care  1. Have you been to the ER, urgent care clinic since your last visit? Hospitalized since your last visit? No    2. Have you seen or consulted any other health care providers outside of the [address] [place] [address] since your last visit? Include any pap smears or colon screening. No    Medications  Does the patient need refills? YES    Learning Assessment Complete?  yes

## 2017-10-18 NOTE — PROGRESS NOTES
Cathryn Lundberg completed screening documentation. No contraindications for administering today's ordered vaccines. Vaccine Immunization Statement(s) given and instructions for adverse reaction. No adverse reaction noted at time of discharge, explained possible s/e. Reviewed symptoms indicating need to be seen in ER. Patient given flu vaccine; denies fever. Pt had no adverse reaction at time of d/c. Vaccine administration documented into [place] Immunization Information System.

## 2017-10-18 NOTE — PROGRESS NOTES
HISTORY OF PRESENT ILLNESS  Tomasa Lane is a 62 y.o. female. HPI Comments: F/u anxiety, hyperlipidemia, hypothyroidism. States that her brother stopped breathing and had no pulse, and then she shook him hard and he gasped and started breathing again. She has spoken with her counselor about that and now understands that he is near death and seems more at peace with that. Reports the l-thyroxine is making her sick, has a hard time eating for the whole day after she takes it. Is having a hard time eating in general due to being upset. Isn't sleeping well either, despite taking clonazepam nightly. The aide who helps her just had a MI and is out for a week or 2. Follow-up         ROS    Physical Exam   Constitutional: She appears well-developed and well-nourished. No distress. Wt low but stable since the last visit. Less tearful than usual.   Neck: No thyromegaly present. Cardiovascular: Normal rate, regular rhythm and normal heart sounds. Pulmonary/Chest: Breath sounds normal.   Abdominal: Soft. She exhibits no mass. There is no tenderness. Musculoskeletal: She exhibits no edema. ASSESSMENT and PLAN  MIRANDA-- continue counseling and clonazepam.  I am not going to increase the dose. Hypothyroidism-- was probably subclinical.  Check tsh, will consider stopping the small dose of levothyroxine that she's on. If I continue it, needs refills. Hyperlipidemia-- check lipids and cmp today. Will need refills. F/u 1-2 months for [facility] [employer] and to see me.

## 2017-10-19 LAB — T4 FREE SERPL-MCNC: 0.8 NG/DL (ref 0.8–1.5)

## 2017-10-19 NOTE — PROGRESS NOTES
Thyroid is a little lower probably because she isn't taking the med. I am ok with her not taking it for now to see if she can eat more, and then we will recheck level in 3-6 months.

## 2017-10-24 NOTE — PROGRESS NOTES
I called pt today and left a generic message asking the pt to call back to speak with a nurse to get a message from the provider.  Bette Puckett RN

## 2017-10-25 NOTE — PROGRESS NOTES
Spoke to pt who reports she has not missed any thyroid medication doses and that she does have a hard time eating more. No further questions by patient.

## 2017-10-27 ENCOUNTER — DOCUMENTATION ONLY (OUTPATIENT)
Dept: FAMILY MEDICINE CLINIC | Age: 57
End: 2017-10-27

## 2017-10-27 NOTE — PROGRESS NOTES
Called pt today and scheduled for FS on 11/1/17 at 1:15 and scheduled for 2nd time ACP appointment on 12/8/17 at 9. Pt will speak to  about either coming with her or being available by phone.  Crista Morris RN

## 2017-11-28 RX ORDER — CLONAZEPAM 0.5 MG/1
TABLET ORAL
Qty: 90 TAB | Refills: 0 | Status: CANCELLED | OUTPATIENT
Start: 2017-11-28

## 2017-11-28 RX ORDER — CLONAZEPAM 0.5 MG/1
TABLET ORAL
Qty: 90 TAB | Refills: 0 | OUTPATIENT
Start: 2017-11-28 | End: 2017-12-07 | Stop reason: SDUPTHER

## 2017-11-28 NOTE — TELEPHONE ENCOUNTER
Pt called she has an appointment to see Dr Breezy Cardenas on 12/7. She will run out of Klonopin tomorrow, is asking for a refill.   Forwarding message to Dr Breezy Cardenas

## 2017-11-29 DIAGNOSIS — E78.00 HYPERCHOLESTEROLEMIA: ICD-10-CM

## 2017-11-29 RX ORDER — SIMVASTATIN 20 MG/1
TABLET, FILM COATED ORAL
Qty: 30 TAB | Refills: 5 | Status: CANCELLED | OUTPATIENT
Start: 2017-11-29

## 2017-11-29 RX ORDER — SIMVASTATIN 20 MG/1
20 TABLET, FILM COATED ORAL
Qty: 30 TAB | Refills: 5 | Status: SHIPPED | OUTPATIENT
Start: 2017-11-29 | End: 2018-05-16

## 2017-11-29 NOTE — TELEPHONE ENCOUNTER
Pt called to ask for Dr. Yaima Barney to refill her Simvastatin and her Synthroid. She will be out after today. I told pt message for Synthroid needs review because last lab note it looked like Dr. Yaima Barney might not refill this and wait and see if pt needs the medicine when labs are rechecked. Routing to Dr. Yaima Barney for review. Sonia Allan, RN    Also, pt canceled her [facility] appointment for 12/8/17 and says she will reschedule later.  Sonia Allan, RN

## 2017-11-29 NOTE — TELEPHONE ENCOUNTER
I am refilling the simvastatin but holding off on the l-thyroxine, will discuss with her when she comes in.

## 2017-12-07 ENCOUNTER — OFFICE VISIT (OUTPATIENT)
Dept: FAMILY MEDICINE CLINIC | Age: 57
End: 2017-12-07

## 2017-12-07 VITALS
HEIGHT: 65 IN | HEART RATE: 73 BPM | WEIGHT: 104 LBS | DIASTOLIC BLOOD PRESSURE: 88 MMHG | TEMPERATURE: 97.8 F | SYSTOLIC BLOOD PRESSURE: 145 MMHG | BODY MASS INDEX: 17.33 KG/M2

## 2017-12-07 DIAGNOSIS — F41.1 GAD (GENERALIZED ANXIETY DISORDER): ICD-10-CM

## 2017-12-07 DIAGNOSIS — Z72.0 TOBACCO ABUSE: ICD-10-CM

## 2017-12-07 DIAGNOSIS — Z13.9 ENCOUNTER FOR SCREENING: Primary | ICD-10-CM

## 2017-12-07 DIAGNOSIS — Z23 ENCOUNTER FOR IMMUNIZATION: ICD-10-CM

## 2017-12-07 LAB
GLUCOSE POC: NORMAL MG/DL
HGB BLD-MCNC: 13.8 G/DL

## 2017-12-07 RX ORDER — CLONAZEPAM 0.5 MG/1
TABLET ORAL
Qty: 90 TAB | Refills: 0 | Status: SHIPPED | OUTPATIENT
Start: 2017-12-07 | End: 2018-01-22 | Stop reason: SDUPTHER

## 2017-12-07 RX ORDER — AZITHROMYCIN 250 MG/1
TABLET, FILM COATED ORAL
Qty: 6 TAB | Refills: 0 | Status: SHIPPED | OUTPATIENT
Start: 2017-12-07 | End: 2017-12-12

## 2017-12-07 NOTE — PROGRESS NOTES
Clarence Moreno completed screening documentation which includes allergies to medications, latex and eggs/egg product. Patient denies fever. No contraindications for administering today's ordered vaccines. VIS given which includes instructions for adverse reaction. No adverse reaction noted at time of discharge, explained possible s/e. Reviewed symptoms indicating need to be seen in ER.   Patient given prevnar 13 vaccine by Sophy Gonzalez RN

## 2017-12-07 NOTE — PROGRESS NOTES
Coordination of Care  1. Have you been to the ER, urgent care clinic since your last visit? Hospitalized since your last visit? No    2. Have you seen or consulted any other health care providers outside of the [address] [place] [address] since your last visit? Include any pap smears or colon screening. No    Medications  Does the patient need refills?  YES    Learning Assessment Complete? no    Results for orders placed or performed in visit on 12/07/17   AMB POC GLUCOSE BLOOD, BY GLUCOSE MONITORING DEVICE   Result Value Ref Range    Glucose POC 74 fasting mg/dL   AMB POC HEMOGLOBIN (HGB)   Result Value Ref Range    Hemoglobin (POC) 13.8
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
## 2017-12-11 RX ORDER — LEVOTHYROXINE SODIUM 25 UG/1
TABLET ORAL
Qty: 30 TAB | Refills: 5 | OUTPATIENT
Start: 2017-12-11

## 2017-12-15 ENCOUNTER — TELEPHONE (OUTPATIENT)
Dept: FAMILY MEDICINE CLINIC | Age: 57
End: 2017-12-15

## 2017-12-15 NOTE — TELEPHONE ENCOUNTER
Patient is asking for another round of abx as she took the last of zpack and still having some drainage from ear.

## 2017-12-20 NOTE — TELEPHONE ENCOUNTER
Spoke to the patient, she is feeling a little better. Gave her Dr Farzad Davis message. Asked that she call us if after 14 days she is not better, pt said she would .  She has an appointment to see Dr Joann Zamudio in early January

## 2018-01-22 DIAGNOSIS — F41.1 GENERALIZED ANXIETY DISORDER: Primary | ICD-10-CM

## 2018-01-22 RX ORDER — CLONAZEPAM 0.5 MG/1
TABLET ORAL
Qty: 90 TAB | Refills: 0 | OUTPATIENT
Start: 2018-01-22 | End: 2018-02-22 | Stop reason: SDUPTHER

## 2018-02-13 ENCOUNTER — TELEPHONE (OUTPATIENT)
Dept: FAMILY MEDICINE CLINIC | Age: 58
End: 2018-02-13

## 2018-02-13 NOTE — TELEPHONE ENCOUNTER
Pt called to say her brother is in the hospital and has been diagnosed with the flu. Ms Priya Rutherford thinks she has it now and is asking for something to be called in for her. Symptoms started yesterday, stuffy nose, cough, 101 temperature. Forwarding to Dr Johnny Swann.

## 2018-02-14 RX ORDER — OSELTAMIVIR PHOSPHATE 75 MG/1
75 CAPSULE ORAL 2 TIMES DAILY
Qty: 10 CAP | Refills: 0 | Status: SHIPPED | OUTPATIENT
Start: 2018-02-14 | End: 2018-02-19

## 2018-02-14 NOTE — TELEPHONE ENCOUNTER
Sent rx and called pt, needs to  today, bring Goodrx card. She sounds pretty good, doing supportive measures.

## 2018-02-22 ENCOUNTER — OFFICE VISIT (OUTPATIENT)
Dept: FAMILY MEDICINE CLINIC | Age: 58
End: 2018-02-22

## 2018-02-22 VITALS
WEIGHT: 98 LBS | DIASTOLIC BLOOD PRESSURE: 88 MMHG | BODY MASS INDEX: 16.33 KG/M2 | SYSTOLIC BLOOD PRESSURE: 133 MMHG | HEIGHT: 65 IN | HEART RATE: 98 BPM | TEMPERATURE: 97.6 F

## 2018-02-22 DIAGNOSIS — E78.00 HYPERCHOLESTEROLEMIA: ICD-10-CM

## 2018-02-22 DIAGNOSIS — F41.1 GENERALIZED ANXIETY DISORDER: ICD-10-CM

## 2018-02-22 RX ORDER — CLONAZEPAM 0.5 MG/1
TABLET ORAL
Qty: 90 TAB | Refills: 1 | Status: SHIPPED | OUTPATIENT
Start: 2018-02-22 | End: 2018-05-16 | Stop reason: SDUPTHER

## 2018-02-22 RX ORDER — SIMVASTATIN 20 MG/1
20 TABLET, FILM COATED ORAL
Qty: 30 TAB | Refills: 5 | Status: CANCELLED | OUTPATIENT
Start: 2018-02-22

## 2018-02-22 RX ORDER — CYCLOBENZAPRINE HCL 10 MG
TABLET ORAL
Qty: 30 TAB | Refills: 0 | Status: SHIPPED | OUTPATIENT
Start: 2018-02-22 | End: 2018-05-16 | Stop reason: SDUPTHER

## 2018-02-22 NOTE — PROGRESS NOTES
HISTORY OF PRESENT ILLNESS  Sandi Sheikh is a 62 y.o. female. HPI Comments: Brother has been hosp twice since last visit, end-stage Alzheimers, and Alejandra Crow continues to have difficulty dealing with the fact that he is going to die soon. Has found a good support group on-line for careegivers of patients with Alzheimers. She reports she is sleeping well, not eating much. Taking meds as directed. Had a flulike illness which resolved by the time she finished the Tamiflu. Anxiety         ROS    Physical Exam   Constitutional:   Wt. Down 6 lb, only mildly tearful. Neck: No thyromegaly present. Cardiovascular:   No carotid bruits   Pulmonary/Chest:   Rhonchi with a few wheezes throughout, most clear with cough       ASSESSMENT and PLAN  MIRANDA with panic/agorophobia. Stable.   Is not abusing the clonazepam.

## 2018-02-22 NOTE — PROGRESS NOTES
Chief Complaint   Patient presents with    Anxiety     Coordination of Care  1. Have you been to the ER, urgent care clinic since your last visit? Hospitalized since your last visit? No    2. Have you seen or consulted any other health care providers outside of the [address] [place] [address] since your last visit? Include any pap smears or colon screening. No    Does the patient need refills? YES    Learning Assessment Complete?  yes

## 2018-04-23 ENCOUNTER — TELEPHONE (OUTPATIENT)
Dept: FAMILY MEDICINE CLINIC | Age: 58
End: 2018-04-23

## 2018-04-23 DIAGNOSIS — F40.01 AGORAPHOBIA WITH PANIC ATTACKS: Primary | ICD-10-CM

## 2018-04-23 RX ORDER — DIAZEPAM 5 MG/1
5 TABLET ORAL
Qty: 28 TAB | Refills: 0 | OUTPATIENT
Start: 2018-04-23 | End: 2018-05-15 | Stop reason: SINTOL

## 2018-04-23 NOTE — TELEPHONE ENCOUNTER
As above. I called the pt. She reports that her brother is now in hospice in his home and she is there with him, 'can't stop moving'. Hospice nurses come in twice a day and her brother is now on strong pain med and usually sleeping, when not sleeping is 'fighting me'. She reports panic attacks and doesn't think clonazepam does much for her any more.  is with her, and has aide twice a week, and she has been able to get out to take walks. Is asking for 'valium or xanax' and I discussed that these meds are just like clonaz except with difft length of action. She still wants to try one of these. Will have her continue clonazepam 1.5 tabs at hs, and have her take 5mg diazepam in am and another 6-8 hours later, rx only 28. States she still has about 20-25 clonazepam left.

## 2018-04-23 NOTE — TELEPHONE ENCOUNTER
Pt called to say she is extremely anxious. She took 1 Klonipin tablet 'just now'. She said she is meant to take 1.5 tabs in the am and 1.5 tabs in the pm.  She did not take any this morning. She is asking for something for anxiety. She is caring for her dying brother, she is at home with her brother and her  who is 'not leaving her side'. Asked for Dr Srikanth Curiel to call her.

## 2018-04-26 ENCOUNTER — TELEPHONE (OUTPATIENT)
Dept: FAMILY MEDICINE CLINIC | Age: 58
End: 2018-04-26

## 2018-04-26 NOTE — TELEPHONE ENCOUNTER
F/u call to patient, explained she can discuss at next visit with Dr Ewa Graham. Pt says she has taken her klonopin and she has been very calm today.

## 2018-04-26 NOTE — TELEPHONE ENCOUNTER
Patient reported taking Valium yesterday around 1130 or 1200 and \"I didn't like the way it made me feel, I'd rather stick to Klonopin. \"

## 2018-05-16 ENCOUNTER — OFFICE VISIT (OUTPATIENT)
Dept: FAMILY MEDICINE CLINIC | Age: 58
End: 2018-05-16

## 2018-05-16 VITALS
BODY MASS INDEX: 15.88 KG/M2 | HEART RATE: 82 BPM | WEIGHT: 93 LBS | SYSTOLIC BLOOD PRESSURE: 154 MMHG | TEMPERATURE: 97.7 F | DIASTOLIC BLOOD PRESSURE: 84 MMHG | HEIGHT: 64 IN

## 2018-05-16 DIAGNOSIS — F41.1 GENERALIZED ANXIETY DISORDER: ICD-10-CM

## 2018-05-16 RX ORDER — CYCLOBENZAPRINE HCL 10 MG
TABLET ORAL
Qty: 30 TAB | Refills: 2 | Status: SHIPPED | OUTPATIENT
Start: 2018-05-16 | End: 2018-08-01 | Stop reason: SDUPTHER

## 2018-05-16 RX ORDER — CLONAZEPAM 0.5 MG/1
TABLET ORAL
Qty: 90 TAB | Refills: 0 | Status: SHIPPED | OUTPATIENT
Start: 2018-05-16 | End: 2018-07-20 | Stop reason: SDUPTHER

## 2018-05-16 NOTE — PROGRESS NOTES
Coordination of Care  1. Have you been to the ER, urgent care clinic since your last visit? Hospitalized since your last visit? No    2. Have you seen or consulted any other health care providers outside of the [facility] since your last visit? Include any pap smears or colon screening. No    Does the patient need refills? YES    Learning Assessment Complete?  yes  Depression Screening complete in the past 12 months? yes

## 2018-05-16 NOTE — PROGRESS NOTES
HISTORY OF PRESENT ILLNESS  Luann Tran is a 62 y.o. female. HPI Comments: F/u MIRANDA-- brother is in home hospice and is now well-medicated and sleeps peacefully most of the time. Arturo Richardson is getting a lot of support from the hospice nurses and feels much better. Keeping the diazepam in case she needs it, but isn't using it. No change in how she is taking clonazepam.  Is not taking l-thyroxine or simvastatin. Had mildly high tsh but was not sx several months ago, and has trouble taking the l-thy on an empty stomach. Still has poor appetite, still smoking 1/2ppd. Still refuses mammo. Anxiety         ROS    Physical Exam   Constitutional: She appears well-developed and well-nourished. No distress. Wt down 5 lb. Neck: No thyromegaly present. Cardiovascular: Normal rate, regular rhythm and normal heart sounds. Pulmonary/Chest: Breath sounds normal.   Musculoskeletal: She exhibits no edema. ASSESSMENT and PLAN  MIRANDA-- isn't abusing benzo. Refilled clonazepam for 1 month. Will not refill the xanax. H/o hyperlipidemia and mild hypothy. Recheck labs in about 6 months or if develops sx hypothyroidism. Tobacco dependence-- not interested in quitting.

## 2018-06-15 ENCOUNTER — TELEPHONE (OUTPATIENT)
Dept: FAMILY MEDICINE CLINIC | Age: 58
End: 2018-06-15

## 2018-06-15 RX ORDER — AZITHROMYCIN 250 MG/1
TABLET, FILM COATED ORAL
Qty: 6 TAB | Refills: 0 | Status: SHIPPED | OUTPATIENT
Start: 2018-06-15 | End: 2018-06-20

## 2018-07-19 DIAGNOSIS — F41.1 GENERALIZED ANXIETY DISORDER: ICD-10-CM

## 2018-07-19 RX ORDER — CLONAZEPAM 0.5 MG/1
TABLET ORAL
Qty: 90 TAB | Refills: 0 | Status: CANCELLED | OUTPATIENT
Start: 2018-07-19

## 2018-07-20 DIAGNOSIS — F41.1 GENERALIZED ANXIETY DISORDER: ICD-10-CM

## 2018-07-20 RX ORDER — CLONAZEPAM 0.5 MG/1
TABLET ORAL
Qty: 90 TAB | Refills: 0 | OUTPATIENT
Start: 2018-07-20 | End: 2018-08-01 | Stop reason: SDUPTHER

## 2018-07-26 ENCOUNTER — OFFICE VISIT (OUTPATIENT)
Dept: FAMILY MEDICINE CLINIC | Age: 58
End: 2018-07-26

## 2018-07-26 VITALS
SYSTOLIC BLOOD PRESSURE: 138 MMHG | HEART RATE: 75 BPM | BODY MASS INDEX: 16.9 KG/M2 | DIASTOLIC BLOOD PRESSURE: 85 MMHG | TEMPERATURE: 97.8 F | WEIGHT: 99 LBS

## 2018-07-26 DIAGNOSIS — F41.1 GENERALIZED ANXIETY DISORDER: Primary | ICD-10-CM

## 2018-07-26 DIAGNOSIS — Z72.0 TOBACCO ABUSE: ICD-10-CM

## 2018-07-26 DIAGNOSIS — F43.21 GRIEF REACTION: ICD-10-CM

## 2018-07-26 NOTE — PROGRESS NOTES
Coordination of Care  1. Have you been to the ER, urgent care clinic since your last visit? Hospitalized since your last visit? No    2. Have you seen or consulted any other health care providers outside of the [facility] since your last visit? Include any pap smears or colon screening. No    Does the patient need refills? YES    Learning Assessment Complete?  yes  Depression Screening complete in the past 12 months? yes

## 2018-07-26 NOTE — PROGRESS NOTES
Nicole Mcdonald is a 62 y.o. female    Issues discussed today include:    Chief Complaint   Patient presents with    Anxiety     Pt states she is stuttering for a week now and takeing her clonazepham 3 times a day and having headaches    Thyroid Problem     1) Anxiety and panic attacks:  Pt comes today with her . The hx is gathered from both of them. Pt's brother Liz Sotomayor  at age 63 yo on 18. Pt was her brother's primary caretaker, as he suffered with down syndrome and multiple medical problems. Pt did near everything for him, feeding, bathing, etc. Her  says he was always amazed by all she was able to do and did selflessly for her brother. Pt has for a long time had a strict schedule in caring for her brother (and her parents before that). Now doesn't know what to do with herself, finds she gets most anxious and tearful when the clock reaches certain times of day. Also very affected by dates of significance (date her mother , etc). Not eating much, but  notes she has gained weight. Pt has had chronic \"shaking spells\" when she gets anxious. This has continued, but she now has a new stuttering problem.  reports the speech change started 4 hrs after Gal's death after pt had a histerical outburst. The stuttering does get better when pt is calm at home. Denies vision changes, extremity or other focal weakness, facial droop or confusion. She is taking her klonopin as 0.5-1.5 tabs prn - it does make her sleepy, so takes less mg during the day typically. She reports SEs or ineffectiveness of paxil, zoloft, celexa, effexor, wellbutrin in the past. Does not think she's ever tried lexapro or remeron. Pt and spouse are asking about the possibility of getting disability for a period of time and the process for this. They also share they are planning to move to the [facility] to live on a farm - hoping this change of environment will help pt.  They have a vision to start a work program for [employer]. Data reviewed or ordered today:       Other problems include:  Patient Active Problem List   Diagnosis Code    Tympanic membrane rupture H72.90    Long Q-T syndrome I45.81    WPW (Fazal-Parkinson-White syndrome) I45.6    Panic disorder with agoraphobia F40.01    Generalized anxiety disorder F41.1    Migraine G43.909    Hyperlipidemia E78.5    Osteoporosis M81.0    Chest pain R07.9    Palpitations R00.2    Acquired hypothyroidism E03.9       Medications:  Current Outpatient Prescriptions   Medication Sig Dispense Refill    clonazePAM (KLONOPIN) 0.5 mg tablet TAKE ONE & ONE-HALF TABLETS BY MOUTH IN THE MORNING AND ONE & ONE-HALF ONCE DAILY AT BEDTIME AS NEEDED FOR ANXIETY. I phoned in. 90 Tab 0    cyclobenzaprine (FLEXERIL) 10 mg tablet TAKE ONE TO ONE AND ONE-HALF TABLET BY MOUTH ONCE DAILY AS NEEDED FOR HEADACHE 30 Tab 2    aspirin delayed-release 81 mg tablet Take 1 Tab by mouth daily. 1 Tab 0    nitroglycerin (NITROSTAT) 0.4 mg SL tablet 1 Tab by SubLINGual route every five (5) minutes as needed for Chest Pain. For 2 doses. If cp is unrelieved after second dose call 911. 1 Bottle 0       Allergies: Allergies   Allergen Reactions    Bacitracin Swelling    Bactrim [Sulfamethoprim Ds] Rash    Ciprofloxacin Swelling    Fosamax [Alendronate] Nausea Only    Imitrex [Sumatriptan Succinate] Other (comments)     Chest pain    Keflex [Cephalexin] Nausea and Vomiting    Pcn [Penicillins] Swelling    Trazodone Other (comments)     Nightmares       LMP:  No LMP recorded. Patient has had a hysterectomy. Social History     Social History    Marital status:      Spouse name: N/A    Number of children: N/A    Years of education: N/A     Occupational History    Not on file.      Social History Main Topics    Smoking status: Current Every Day Smoker     Packs/day: 0.75     Years: 25.00     Types: Cigarettes    Smokeless tobacco: Never Used      Comment: 8 cig a day  Alcohol use No    Drug use: No    Sexual activity: Not on file     Other Topics Concern    Not on file     Social History Narrative       Family History   Problem Relation Age of Onset   Abby Nixon Cancer Father      lung,  age 58         Physical Exam   Visit Vitals    /85 (BP 1 Location: Right arm, BP Patient Position: Sitting)    Pulse 75    Temp 97.8 °F (36.6 °C)    Wt 99 lb (44.9 kg)    BMI 16.9 kg/m2      BP Readings from Last 3 Encounters:   18 138/85   18 154/84   18 133/88     Constitutional: Appears well,  No acute distress, mildly anxious, Vitals noted  Psychiatric:  Affect normal, tearful at times Alert and Oriented to person/place/time  Eyes:  Conjunctiva clear, no drainage  ENT:  External ears and nose normal, Mucous membranes moist  Neck:  General inspection normal. Supple. Heart:  Normal HR, Normal S1 and S2,  Regular rhythm. No murmurs, rubs or gallops. Lungs:  Clear to auscultation, good respiratory effort, no wheezes, rales or rhonchi  Extremities: Without edema, good peripheral pulses  Skin:  Warm to palpation, without rashes  Neuro: Mild tremor intermittent, speech is stuttering, but thought content is appropriate, CNII-XII intact, symmetric and intact sensation to light touch throughout, equal and full motor strength in all extremities, DTRs symmetric and normal, no clonus        Assessment/Plan:      ICD-10-CM ICD-9-CM    1. Generalized anxiety disorder F41.1 300.02    2. Grief reaction F43.20 309.0    3. Tobacco abuse Z72.0 305.1      Pt with long h/o severe anxiety, has learned to cope and has great support from her . Multiple medications trialed for MIRANDA tx, klonopin mod effective, but transient effect. They ask about getting rx for valium, but they acknowledge the risks of too much sedation and tell me Dr. Daniella Valenzuela did not want to prescribe it either. After long discussion, no med changes made today.   - Work on exercise, walk daily - pt's  is with her 24/7 and can encourage/join her in this  - Encourage PO intake (up 6 lbs in last 2 months)  - Close f/u, javid if any new or worsening sxs  - Desires to quit smoking, but hard time now with stress - will discuss more at f/u    Speech change, stuttering - given normal neuro exam, association with major life loss and modifiable nature, unlikely CVA. Discussed with Dr. Coby Leroy who knows pt well and she agrees. - Hold off on any head imaging right now  - pt and  know to call back if any change in neuro/mentation status    I am in support of pt getting short term disability, gave pt some information on the process through [employer] office  - RTC as needed for out assistance with this process      Follow-up Disposition:  Return in about 4 weeks (around 8/23/2018).         Jonathan Workman MD  [address] [place] [address] [facility]

## 2018-08-01 ENCOUNTER — OFFICE VISIT (OUTPATIENT)
Dept: FAMILY MEDICINE CLINIC | Age: 58
End: 2018-08-01

## 2018-08-01 VITALS
WEIGHT: 96.8 LBS | DIASTOLIC BLOOD PRESSURE: 85 MMHG | HEIGHT: 64 IN | SYSTOLIC BLOOD PRESSURE: 152 MMHG | TEMPERATURE: 98.4 F | HEART RATE: 70 BPM | BODY MASS INDEX: 16.53 KG/M2

## 2018-08-01 DIAGNOSIS — F41.1 GENERALIZED ANXIETY DISORDER: ICD-10-CM

## 2018-08-01 DIAGNOSIS — F43.21 GRIEF REACTION: Primary | ICD-10-CM

## 2018-08-01 RX ORDER — CYCLOBENZAPRINE HCL 10 MG
TABLET ORAL
Qty: 30 TAB | Refills: 2 | Status: SHIPPED | OUTPATIENT
Start: 2018-08-01 | End: 2019-01-25 | Stop reason: SDUPTHER

## 2018-08-01 RX ORDER — CLONAZEPAM 0.5 MG/1
TABLET ORAL
Qty: 90 TAB | Refills: 0 | Status: SHIPPED | OUTPATIENT
Start: 2018-08-01 | End: 2018-08-29 | Stop reason: ALTCHOICE

## 2018-08-01 NOTE — PROGRESS NOTES
Coordination of Care  1. Have you been to the ER, urgent care clinic since your last visit? Hospitalized since your last visit? No    2. Have you seen or consulted any other health care providers outside of the [address] [place] [address] since your last visit? Include any pap smears or colon screening. No    Does the patient need refills? NO    Learning Assessment Complete?  yes  Depression Screening complete in the past 12 months? yes

## 2018-08-01 NOTE — PROGRESS NOTES
F/u from the other day. She thought she could get on short-term disability through the state, but was given paperwork for SSI and wants to apply for that. Reports no disability through her job. Reports she has had a stent put in her coronaries years ago when a pt at [facility], which I didn't know about. Has had WPW with neg stress imaging , was put on b blocker for palpitations but never started it. Reports palpitations without other sx about once daily lately and no real change over the years. Reports she continues to have good support from her , hospice , nurses and sw. As she grieves for the loss of her brother. She continues to feel overwhelmed with things she needs to get done, including figuring out how to pay for brother's , and other paperwork related to his death. Feels jittery and nervous most of the time, but is getting out to sit outside, and to go for drives. 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 Stuttering and shaky todaay, has good insight and normal judgment and thinking. We reviewed the disability papers together. She thought I would make the disability decision. I discussed the process with her and also that she needs to give [facility] information to disability so they will get those records as well as ours. Will plan to get loop recorder, I ordered it today.

## 2018-08-16 RX ORDER — NITROGLYCERIN 0.4 MG/1
0.4 TABLET SUBLINGUAL
Qty: 1 BOTTLE | Refills: 0 | Status: SHIPPED | OUTPATIENT
Start: 2018-08-16 | End: 2019-01-03 | Stop reason: SDUPTHER

## 2018-08-16 NOTE — TELEPHONE ENCOUNTER
Pt LM asking for refill of her Nitroglycerin. Routing message to Dr. Dax Galvan for review.  Vonda Dominguez RN

## 2018-08-23 ENCOUNTER — HOSPITAL ENCOUNTER (EMERGENCY)
Age: 58
Discharge: HOME | End: 2018-08-23
Attending: EMERGENCY MEDICINE
Payer: SELF-PAY

## 2018-08-23 ENCOUNTER — TELEPHONE (OUTPATIENT)
Dept: FAMILY MEDICINE CLINIC | Age: 58
End: 2018-08-23

## 2018-08-23 ENCOUNTER — APPOINTMENT (OUTPATIENT)
Dept: CT IMAGING | Age: 58
End: 2018-08-23
Attending: EMERGENCY MEDICINE
Payer: SELF-PAY

## 2018-08-23 VITALS
DIASTOLIC BLOOD PRESSURE: 92 MMHG | SYSTOLIC BLOOD PRESSURE: 128 MMHG | HEIGHT: 66 IN | HEART RATE: 106 BPM | BODY MASS INDEX: 14.7 KG/M2 | OXYGEN SATURATION: 95 % | RESPIRATION RATE: 18 BRPM | WEIGHT: 91.5 LBS | TEMPERATURE: 98.1 F

## 2018-08-23 DIAGNOSIS — R53.1 WEAKNESS: ICD-10-CM

## 2018-08-23 DIAGNOSIS — R63.4 WEIGHT LOSS: ICD-10-CM

## 2018-08-23 DIAGNOSIS — F41.9 ANXIETY: Primary | ICD-10-CM

## 2018-08-23 LAB
ALBUMIN SERPL-MCNC: 4 G/DL (ref 3.5–5)
ALBUMIN/GLOB SERPL: 1.2 {RATIO} (ref 1.1–2.2)
ALP SERPL-CCNC: 93 U/L (ref 45–117)
ALT SERPL-CCNC: 24 U/L (ref 12–78)
ANION GAP SERPL CALC-SCNC: 5 MMOL/L (ref 5–15)
APPEARANCE UR: CLEAR
AST SERPL-CCNC: 20 U/L (ref 15–37)
BASOPHILS # BLD: 0.1 K/UL (ref 0–0.1)
BASOPHILS NFR BLD: 2 % (ref 0–1)
BILIRUB SERPL-MCNC: 0.2 MG/DL (ref 0.2–1)
BILIRUB UR QL: NEGATIVE
BUN SERPL-MCNC: 13 MG/DL (ref 6–20)
BUN/CREAT SERPL: 20 (ref 12–20)
CALCIUM SERPL-MCNC: 8.8 MG/DL (ref 8.5–10.1)
CHLORIDE SERPL-SCNC: 103 MMOL/L (ref 97–108)
CO2 SERPL-SCNC: 31 MMOL/L (ref 21–32)
COLOR UR: NORMAL
COMMENT, HOLDF: NORMAL
CREAT SERPL-MCNC: 0.64 MG/DL (ref 0.55–1.02)
DIFFERENTIAL METHOD BLD: ABNORMAL
EOSINOPHIL # BLD: 0.1 K/UL (ref 0–0.4)
EOSINOPHIL NFR BLD: 2 % (ref 0–7)
ERYTHROCYTE [DISTWIDTH] IN BLOOD BY AUTOMATED COUNT: 12.9 % (ref 11.5–14.5)
GLOBULIN SER CALC-MCNC: 3.4 G/DL (ref 2–4)
GLUCOSE SERPL-MCNC: 92 MG/DL (ref 65–100)
GLUCOSE UR STRIP.AUTO-MCNC: NEGATIVE MG/DL
HCT VFR BLD AUTO: 43.6 % (ref 35–47)
HGB BLD-MCNC: 15 G/DL (ref 11.5–16)
HGB UR QL STRIP: NEGATIVE
IMM GRANULOCYTES # BLD: 0 K/UL (ref 0–0.04)
IMM GRANULOCYTES NFR BLD AUTO: 0 % (ref 0–0.5)
KETONES UR QL STRIP.AUTO: NEGATIVE MG/DL
LEUKOCYTE ESTERASE UR QL STRIP.AUTO: NEGATIVE
LYMPHOCYTES # BLD: 1.2 K/UL (ref 0.8–3.5)
LYMPHOCYTES NFR BLD: 25 % (ref 12–49)
MAGNESIUM SERPL-MCNC: 2.1 MG/DL (ref 1.6–2.4)
MCH RBC QN AUTO: 31.9 PG (ref 26–34)
MCHC RBC AUTO-ENTMCNC: 34.4 G/DL (ref 30–36.5)
MCV RBC AUTO: 92.8 FL (ref 80–99)
MONOCYTES # BLD: 0.4 K/UL (ref 0–1)
MONOCYTES NFR BLD: 9 % (ref 5–13)
NEUTS SEG # BLD: 3 K/UL (ref 1.8–8)
NEUTS SEG NFR BLD: 63 % (ref 32–75)
NITRITE UR QL STRIP.AUTO: NEGATIVE
NRBC # BLD: 0 K/UL (ref 0–0.01)
NRBC BLD-RTO: 0 PER 100 WBC
PH UR STRIP: 6.5 [PH] (ref 5–8)
PLATELET # BLD AUTO: 213 K/UL (ref 150–400)
PMV BLD AUTO: 10.3 FL (ref 8.9–12.9)
POTASSIUM SERPL-SCNC: 3.5 MMOL/L (ref 3.5–5.1)
PROT SERPL-MCNC: 7.4 G/DL (ref 6.4–8.2)
PROT UR STRIP-MCNC: NEGATIVE MG/DL
RBC # BLD AUTO: 4.7 M/UL (ref 3.8–5.2)
SAMPLES BEING HELD,HOLD: NORMAL
SODIUM SERPL-SCNC: 139 MMOL/L (ref 136–145)
SP GR UR REFRACTOMETRY: <1.005 (ref 1–1.03)
UROBILINOGEN UR QL STRIP.AUTO: 1 EU/DL (ref 0.2–1)
WBC # BLD AUTO: 4.7 K/UL (ref 3.6–11)

## 2018-08-23 PROCEDURE — 83735 ASSAY OF MAGNESIUM: CPT | Performed by: EMERGENCY MEDICINE

## 2018-08-23 PROCEDURE — 96360 HYDRATION IV INFUSION INIT: CPT

## 2018-08-23 PROCEDURE — 85025 COMPLETE CBC W/AUTO DIFF WBC: CPT | Performed by: EMERGENCY MEDICINE

## 2018-08-23 PROCEDURE — 96361 HYDRATE IV INFUSION ADD-ON: CPT

## 2018-08-23 PROCEDURE — 36415 COLL VENOUS BLD VENIPUNCTURE: CPT | Performed by: EMERGENCY MEDICINE

## 2018-08-23 PROCEDURE — 74011250636 HC RX REV CODE- 250/636: Performed by: EMERGENCY MEDICINE

## 2018-08-23 PROCEDURE — 70450 CT HEAD/BRAIN W/O DYE: CPT

## 2018-08-23 PROCEDURE — 81003 URINALYSIS AUTO W/O SCOPE: CPT | Performed by: EMERGENCY MEDICINE

## 2018-08-23 PROCEDURE — 74011250637 HC RX REV CODE- 250/637: Performed by: EMERGENCY MEDICINE

## 2018-08-23 PROCEDURE — 99284 EMERGENCY DEPT VISIT MOD MDM: CPT

## 2018-08-23 PROCEDURE — 80053 COMPREHEN METABOLIC PANEL: CPT | Performed by: EMERGENCY MEDICINE

## 2018-08-23 PROCEDURE — 93005 ELECTROCARDIOGRAM TRACING: CPT

## 2018-08-23 RX ORDER — DIAZEPAM 5 MG/1
5 TABLET ORAL
Qty: 5 TAB | Refills: 0 | Status: SHIPPED | OUTPATIENT
Start: 2018-08-23 | End: 2018-08-29 | Stop reason: SDUPTHER

## 2018-08-23 RX ORDER — LORAZEPAM 1 MG/1
1 TABLET ORAL
Status: COMPLETED | OUTPATIENT
Start: 2018-08-23 | End: 2018-08-23

## 2018-08-23 RX ADMIN — LORAZEPAM 1 MG: 1 TABLET ORAL at 13:26

## 2018-08-23 RX ADMIN — SODIUM CHLORIDE 1000 ML: 900 INJECTION, SOLUTION INTRAVENOUS at 12:54

## 2018-08-23 NOTE — ED NOTES
Patient ambulated to the bathroom with steady gait, states she feels better after fluid and medication, chills/tremors have lessened.

## 2018-08-23 NOTE — ED NOTES
Patient has hx of poor appetite and low body weight, was up to 113lbs but per son, she has dropped again after brother's death and is now 91lbs.

## 2018-08-23 NOTE — ED PROVIDER NOTES
HPI Comments: 62 y.o. female with no significant past medical history who presents ambulatory from home with chief complaint of AMS. The history was mostly provided by the patient's spouse. Patient had AMS since over a month ago after her brother passed away. She also has generalized weakness, stuttering speech, and tremors. Her symptoms worsened over the past 2 days. Patient also has an appetite loss. She was 113 pounds 1.5 years ago, and she currently weighs 90 pounds. She recently had an unintentional 5 pound weight loss. Patient reports headache and neck stiffness this morning. She was prescribed Valium 10-15 days after her brother passed away. Patient takes Klonopin but was not prescribed anti-depressants. There are no other acute medical concerns at this time. Social hx: Current tobacco use (0.75 packs/day); denies alcohol use; denies illicit drug use  PCP: Debra lOvera MD    Note written by Bright Manning, as dictated by Lelo Loza MD 12:31 PM      The history is provided by the patient and the spouse. No  was used. Past Medical History:   Diagnosis Date    History of mammography, screening     Normal    Pap smear for cervical cancer screening several years       Past Surgical History:   Procedure Laterality Date    HX HYSTERECTOMY           Family History:   Problem Relation Age of Onset    Cancer Father      lung,  age 58       Social History     Social History    Marital status:      Spouse name: N/A    Number of children: N/A    Years of education: N/A     Occupational History    Not on file.      Social History Main Topics    Smoking status: Current Every Day Smoker     Packs/day: 0.75     Years: 25.00     Types: Cigarettes    Smokeless tobacco: Never Used      Comment: 8 cig a day     Alcohol use No    Drug use: No    Sexual activity: Not on file     Other Topics Concern    Not on file     Social History Narrative ALLERGIES: Bacitracin; Bactrim [sulfamethoprim ds]; Ciprofloxacin; Fosamax [alendronate]; Imitrex [sumatriptan succinate]; Keflex [cephalexin]; Pcn [penicillins]; and Trazodone    Review of Systems   Constitutional: Positive for appetite change (decreased). Musculoskeletal: Positive for neck stiffness. Neurological: Positive for tremors, facial asymmetry, speech difficulty (stuttering speech), weakness (generalized) and headaches. All other systems reviewed and are negative. Vitals:    08/23/18 1217   BP: 138/88   Pulse: (!) 106   Resp: 18   Temp: 98.1 °F (36.7 °C)   SpO2: 96%   Weight: 41.5 kg (91 lb 8 oz)   Height: 5' 6\" (1.676 m)            Physical Exam   Constitutional: She appears well-developed. Thin   HENT:   Head: Normocephalic and atraumatic. Eyes: Conjunctivae are normal. No scleral icterus. Neck: Neck supple. No tracheal deviation present. Cardiovascular: Normal rate, regular rhythm, normal heart sounds and intact distal pulses. Exam reveals no gallop and no friction rub. No murmur heard. Pulmonary/Chest: Effort normal and breath sounds normal. She has no wheezes. She has no rales. Abdominal: Soft. She exhibits no distension. There is no tenderness. There is no rebound and no guarding. Musculoskeletal: She exhibits no edema. Neurological: She is alert. She has normal strength. She displays tremor. No cranial nerve deficit or sensory deficit. Stuttering speech   Skin: Skin is warm and dry. No rash noted. Psychiatric: Her mood appears anxious. Nursing note and vitals reviewed. Note written by Bright Waterman, as dictated by Deandra Mendoza MD 12:31 PM    [facility]      ED Course       Procedures  ED EKG interpretation:  Rhythm: sinus tachycardia; Rate (approx.): 101; ST/T wave: non-specific changes.   Note written by Bright Waterman, as dictated by Deandra Mendoza MD 12:31 PM      Progress Note:  Results, treatment, and follow up plan have been discussed with patient/. Questions were answered. She seems improved after Valium. Jill Mary MD  3:10 PM    A/P: weight loss, stuttering speech, weakness, agitation - suspect related to stress from loss of brother last month; reassuring appearance and exam (non-focal neuro); VSS; CBC, CMP, trop, EKG, CT head ok; home with limited Valium and PCP f/u.   Jlil Mary MD  3:12 PM

## 2018-08-23 NOTE — ED TRIAGE NOTES
\"Her brother passed away recently and ever since then she's been in a state of hysteria and panic. She can't stop stuttering and it's getting worse and worse. She now has drooling from the side of her mouth and she can't stop shaking. \" PCP is concerned for stroke, symptoms began over a month ago. Pt has hx of Soliman Parkinson-White syndrome.

## 2018-08-23 NOTE — TELEPHONE ENCOUNTER
Telephone call received from the patient's , Joselito Jaime, stating that he is taking his wife to [facility] now because she has a pounding headache and he believes she is having a stroke. He stated that he wanted to drive her himself and did not want to call an ambulance. He wanted to let Dr. Joann Zamudio know. The call then disconnected. Dr. Joann Zamudio is at [facility] and was made aware of the call. Paty Gomez RN

## 2018-08-23 NOTE — DISCHARGE INSTRUCTIONS
Abnormal Weight Loss: Care Instructions  Your Care Instructions    There are two types of weight loss-normal and abnormal. The normal kind happens when you are trying to lose weight by exercising more or eating less. The abnormal kind happens when you are not trying to lose weight. Many medical problems can cause abnormal weight loss. These include problems with your thyroid gland, long-term infections, mouth or throat problems that make it hard to eat, and digestive problems. They also include depression and cancer. Some medicines also may cause you to lose weight. You can work with your doctor to find the cause of your weight loss. You will probably need tests to do this. Follow-up care is a key part of your treatment and safety. Be sure to make and go to all appointments, and call your doctor if you are having problems. It's also a good idea to know your test results and keep a list of the medicines you take. How can you care for yourself at home? · Weigh yourself at the same time every day. It's best to do it first thing in the morning after you empty your bladder. Be sure to always wear the same amount of clothing. · Write down any changes in your weight and the possible causes. Discuss these with your doctor. · Your doctor may want you to change your diet. Do your best to follow his or her advice. · Ask your doctor if you should see a dietitian. This is a person who can help you plan meals that work best for your lifestyle. · Note any changes in bowel habits. These may include changes in how often you have a bowel movement. Other changes include the color and size of your stools and how solid they are. · If you are prescribed medicines, take them exactly as prescribed. Call your doctor if you think you are having a problem with your medicine. You will get more details on the specific medicines your doctor prescribes. When should you call for help?   Watch closely for changes in your health, and be sure to contact your doctor if:    · You do not get better as expected.     · You continue to lose weight. Where can you learn more? Go to http://vinita-jade.info/. Enter A790 in the search box to learn more about \"Abnormal Weight Loss: Care Instructions. \"  Current as of: October 9, 2017  Content Version: 11.7  © 3720-6371 [employer]. Care instructions adapted under license by [employer] (which disclaims liability or warranty for this information). If you have questions about a medical condition or this instruction, always ask your healthcare professional. [place] [address] any warranty or liability for your use of this information. Anxiety Disorder: Care Instructions  Your Care Instructions    Anxiety is a normal reaction to stress. Difficult situations can cause you to have symptoms such as sweaty palms and a nervous feeling. In an anxiety disorder, the symptoms are far more severe. Constant worry, muscle tension, trouble sleeping, nausea and diarrhea, and other symptoms can make normal daily activities difficult or impossible. These symptoms may occur for no reason, and they can affect your work, school, or social life. Medicines, counseling, and self-care can all help. Follow-up care is a key part of your treatment and safety. Be sure to make and go to all appointments, and call your doctor if you are having problems. It's also a good idea to know your test results and keep a list of the medicines you take. How can you care for yourself at home? · Take medicines exactly as directed. Call your doctor if you think you are having a problem with your medicine. · Go to your counseling sessions and follow-up appointments. · Recognize and accept your anxiety. Then, when you are in a situation that makes you anxious, say to yourself, \"This is not an emergency. I feel uncomfortable, but I am not in danger.  I can keep going even if I feel anxious. \"  · Be kind to your body:  ¨ Relieve tension with exercise or a massage. ¨ Get enough rest.  ¨ Avoid alcohol, caffeine, nicotine, and illegal drugs. They can increase your anxiety level and cause sleep problems. ¨ Learn and do relaxation techniques. See below for more about these techniques. · Engage your mind. Get out and do something you enjoy. Go to a funny movie, or take a walk or hike. Plan your day. Having too much or too little to do can make you anxious. · Keep a record of your symptoms. Discuss your fears with a good friend or family member, or join a support group for people with similar problems. Talking to others sometimes relieves stress. · Get involved in social groups, or volunteer to help others. Being alone sometimes makes things seem worse than they are. · Get at least 30 minutes of exercise on most days of the week to relieve stress. Walking is a good choice. You also may want to do other activities, such as running, swimming, cycling, or playing tennis or team sports. Relaxation techniques  Do relaxation exercises 10 to 20 minutes a day. You can play soothing, relaxing music while you do them, if you wish. · Tell others in your house that you are going to do your relaxation exercises. Ask them not to disturb you. · Find a comfortable place, away from all distractions and noise. · Lie down on your back, or sit with your back straight. · Focus on your breathing. Make it slow and steady. · Breathe in through your nose. Breathe out through either your nose or mouth. · Breathe deeply, filling up the area between your navel and your rib cage. Breathe so that your belly goes up and down. · Do not hold your breath. · Breathe like this for 5 to 10 minutes. Notice the feeling of calmness throughout your whole body. As you continue to breathe slowly and deeply, relax by doing the following for another 5 to 10 minutes:  · Tighten and relax each muscle group in your body.  You can begin at your toes and work your way up to your head. · Imagine your muscle groups relaxing and becoming heavy. · Empty your mind of all thoughts. · Let yourself relax more and more deeply. · Become aware of the state of calmness that surrounds you. · When your relaxation time is over, you can bring yourself back to alertness by moving your fingers and toes and then your hands and feet and then stretching and moving your entire body. Sometimes people fall asleep during relaxation, but they usually wake up shortly afterward. · Always give yourself time to return to full alertness before you drive a car or do anything that might cause an accident if you are not fully alert. Never play a relaxation tape while you drive a car. When should you call for help? Call 911 anytime you think you may need emergency care. For example, call if:    · You feel you cannot stop from hurting yourself or someone else.   Jodee Gallo the numbers for these national suicide hotlines: 1-221-363-TALK (4-504.196.6173) and 2-655-BAGMFET (4-752.112.4209). If you or someone you know talks about suicide or feeling hopeless, get help right away.   Watch closely for changes in your health, and be sure to contact your doctor if:    · You have anxiety or fear that affects your life.     · You have symptoms of anxiety that are new or different from those you had before. Where can you learn more? Go to http://vinita-jade.info/. Enter P754 in the search box to learn more about \"Anxiety Disorder: Care Instructions. \"  Current as of: December 7, 2017  Content Version: 11.7  © 4458-9784 [employer]wise[employer]. Care instructions adapted under license by [employer] (which disclaims liability or warranty for this information).  If you have questions about a medical condition or this instruction, always ask your healthcare professional. [address]  any warranty or liability for your use of this information. Weakness: Care Instructions  Your Care Instructions    Weakness is a lack of physical or muscle strength. You may feel that you need to make extra effort to move your arms, legs, or other muscles. Generalized weakness means that you feel weak in most areas of your body. Another type of weakness may affect just one muscle or group of muscles. You may feel weak and tired after you have done too much activity, such as taking an extra-long hike. This is not a serious problem. It often goes away on its own. Feeling weak can also be caused by medical conditions like thyroid problems, depression, or a virus. Sometimes the cause can be serious. Your doctor may want to do more tests to try to find the cause of the weakness. The doctor has checked you carefully, but problems can develop later. If you notice any problems or new symptoms, get medical treatment right away. Follow-up care is a key part of your treatment and safety. Be sure to make and go to all appointments, and call your doctor if you are having problems. It's also a good idea to know your test results and keep a list of the medicines you take. How can you care for yourself at home? · Rest when you feel tired. · Be safe with medicines. If your doctor prescribed medicine, take it exactly as prescribed. Call your doctor if you think you are having a problem with your medicine. You will get more details on the specific medicines your doctor prescribes. · Do not skip meals. Eating a balanced diet may increase your energy level. · Get some physical activity every day, but do not get too tired. When should you call for help? Call your doctor now or seek immediate medical care if:    · You have new or worse weakness.     · You are dizzy or lightheaded, or you feel like you may faint.    Watch closely for changes in your health, and be sure to contact your doctor if:    · You do not get better as expected. Where can you learn more?   Go to http://lana.info/. Enter 331 [id number] [id number] in the search box to learn more about \"Weakness: Care Instructions. \"  Current as of: November 20, 2017  Content Version: 11.7  © 6889-1543 [employer]. Care instructions adapted under license by [employer] (which disclaims liability or warranty for this information). If you have questions about a medical condition or this instruction, always ask your healthcare professional. [address] any warranty or liability for your use of this information.

## 2018-08-24 ENCOUNTER — PATIENT OUTREACH (OUTPATIENT)
Dept: FAMILY MEDICINE CLINIC | Age: 58
End: 2018-08-24

## 2018-08-24 ENCOUNTER — TELEPHONE (OUTPATIENT)
Dept: FAMILY MEDICINE CLINIC | Age: 58
End: 2018-08-24

## 2018-08-24 DIAGNOSIS — F41.9 ANXIETY: ICD-10-CM

## 2018-08-24 NOTE — TELEPHONE ENCOUNTER
Pt called registrar asking to speak with RN regarding her medications after being seen in the ED yesterday, 8/23/18. RN called pt, who advised that she was given a prescription for Diazepam 5 mg, 5 tabs, at the ED yesterday, and was advised to follow up with her PCP. SHe has an appt on 8/29/18 with Dr. Adrian Jin and she would like to keep that, and requested enough Diazepam to last until that day. She took one in the morning yesterday and one in the evening, which was enough to keep her calm. She was given 5 tabs, and this will not last until the appt on 8/29/18. She spoke with Cecilia Stauffer RN Nurse Navigator, today, and, per patient, was advised to rotate taking Diazepam and Clonazepam. However, pt states that the Clonazepam was not working for her, information she did not share with nurse navigator. RN is routing this message for Dr. Dax Galvan to review, Dr. Adrian Jin is not on the staff schedule today.   Sandoval Morrison RN

## 2018-08-24 NOTE — TELEPHONE ENCOUNTER
Per Dr. Dimitri Dubon, RN called pt to advise that Dr. Dimitri Dubon called in 10 tabs of Diazepam, which should be enough to last until she sees Dr. Evelio Contreras on 8/29/18.   Fifi Rodrigez RN

## 2018-08-28 LAB
ATRIAL RATE: 101 BPM
CALCULATED P AXIS, ECG09: 81 DEGREES
CALCULATED R AXIS, ECG10: 87 DEGREES
CALCULATED T AXIS, ECG11: -42 DEGREES
DIAGNOSIS, 93000: NORMAL
P-R INTERVAL, ECG05: 96 MS
Q-T INTERVAL, ECG07: 376 MS
QRS DURATION, ECG06: 88 MS
QTC CALCULATION (BEZET), ECG08: 487 MS
VENTRICULAR RATE, ECG03: 101 BPM

## 2018-08-29 ENCOUNTER — OFFICE VISIT (OUTPATIENT)
Dept: FAMILY MEDICINE CLINIC | Age: 58
End: 2018-08-29

## 2018-08-29 VITALS
DIASTOLIC BLOOD PRESSURE: 73 MMHG | OXYGEN SATURATION: 97 % | TEMPERATURE: 98.2 F | SYSTOLIC BLOOD PRESSURE: 102 MMHG | HEIGHT: 66 IN | BODY MASS INDEX: 15.08 KG/M2 | WEIGHT: 93.8 LBS | HEART RATE: 83 BPM

## 2018-08-29 DIAGNOSIS — I45.6 WOLFF PARKINSON WHITE PATTERN SEEN ON ELECTROCARDIOGRAM: Primary | ICD-10-CM

## 2018-08-29 DIAGNOSIS — F41.9 ANXIETY: ICD-10-CM

## 2018-08-29 RX ORDER — DIAZEPAM 5 MG/1
5 TABLET ORAL
Qty: 90 TAB | Refills: 1 | Status: SHIPPED | OUTPATIENT
Start: 2018-08-29 | End: 2018-10-15 | Stop reason: SDUPTHER

## 2018-08-29 RX ORDER — DIAZEPAM 5 MG/1
5 TABLET ORAL
Qty: 90 TAB | Refills: 1 | Status: SHIPPED | OUTPATIENT
Start: 2018-08-29 | End: 2018-08-29 | Stop reason: SDUPTHER

## 2018-08-29 NOTE — PROGRESS NOTES
Coordination of Care  1. Have you been to the ER, urgent care clinic since your last visit? Hospitalized since your last visit? Yes When: [facility], 08/2018    2. Have you seen or consulted any other health care providers outside of the [address] [place] [address] since your last visit? Include any pap smears or colon screening. No    Does the patient need refills? YES    Learning Assessment Complete?  yes

## 2018-08-29 NOTE — PROGRESS NOTES
Subjective:     Chief Complaint   Patient presents with   [facility] Follow Up     SUSANNA REYNOLDS     she is a 62y.o. year old female who presents for evalution. Has lost several more pounds, went to the ED the other day for ? CVA-- was having a lot of stuttering, was tx with adding valium to clonazepam and has done better on that. Got several supplemetns, 1 pro/brian, and 1 pro, since she is having trouble eating, early satiety. Using once daily. Trying to get outside daily. The agorophobia is her worst sx now. Went to [employer] with  last week and had anxiety attacks but was able to breathe through them. Wants to be able to take the valium tid. 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 Reports being told in the ED she should have a holter. Pt reports she has palpitations, usually when moving something or doing heavy lifting. Sx may not occur for over several days. H/o wpw on ekg and card saw her once several years ago, was told to take a b-blocker but she never started it. Takes ntg for occas sscp when she feels anxious, has had nl stress echo 2017. Past Medical History:   Diagnosis Date    History of mammography, screening 2012    Normal    Ill-defined condition     WPW    Pap smear for cervical cancer screening several years             Objective:     Vitals:    08/29/18 1408   BP: 102/73   Pulse: 83   Temp: 98.2 °F (36.8 °C)   TempSrc: Oral   SpO2: 97%   Weight: 93 lb 12.8 oz (42.5 kg)   Height: 5' 5.98\" (1.676 m)       Physical Examination: General appearance - very thin (has lost several lb), stuttering has improved.   Mental status - anxious  Chest - clear to auscultation, no wheezes, rales or rhonchi, symmetric air entry  Heart - normal rate, regular rhythm, normal S1, S2, no murmurs, rubs, clicks or gallops        Assessment/ Plan:     1.  agoraphobia with fear of getting back to work, and phsysical weakness related to previous work and exaccerbated by reaction to brother's death. Increase supplements to bid, send me disability papers and will probably need 12 months to get back able to do what she is trained in, needs psych and physical tx. For now, that will be informal but may need to have phobia program, PT if not progressing. Will continue valium although I discussed this will be a taper. She has either had se from SSRI, or longer QT with them. ? Use of fluvoximine, whichshe has likely not taken in the past.  2.  WPW with palpitations-- check event monitor.   Orders Placed This Encounter    EVENT MONITOR POST SYMPTOMS     Standing Status:   Future     Standing Expiration Date:   2/28/2019     Order Specific Question:   Reason for Exam:     Answer:   palpitations, history of ramos parkinson white, on no meds for it

## 2018-09-05 ENCOUNTER — HOSPITAL ENCOUNTER (OUTPATIENT)
Dept: NON INVASIVE DIAGNOSTICS | Age: 58
Discharge: HOME | End: 2018-09-05
Attending: FAMILY MEDICINE
Payer: SELF-PAY

## 2018-09-05 DIAGNOSIS — I45.6 WOLFF PARKINSON WHITE PATTERN SEEN ON ELECTROCARDIOGRAM: ICD-10-CM

## 2018-09-05 PROCEDURE — 93225 XTRNL ECG REC<48 HRS REC: CPT

## 2018-09-19 NOTE — PROGRESS NOTES
Review of Holter monitor- one run of SVT and many PVC's and sinus tach which corresponded to her sxs.  She has f/up with you next week, this is [place]

## 2018-09-26 ENCOUNTER — OFFICE VISIT (OUTPATIENT)
Dept: FAMILY MEDICINE CLINIC | Age: 58
End: 2018-09-26

## 2018-09-26 VITALS
DIASTOLIC BLOOD PRESSURE: 79 MMHG | HEART RATE: 89 BPM | WEIGHT: 94 LBS | BODY MASS INDEX: 15.18 KG/M2 | TEMPERATURE: 98.1 F | SYSTOLIC BLOOD PRESSURE: 110 MMHG

## 2018-09-26 DIAGNOSIS — R20.2 NUMBNESS AND TINGLING OF RIGHT ARM: ICD-10-CM

## 2018-09-26 DIAGNOSIS — R20.0 NUMBNESS AND TINGLING OF RIGHT ARM: ICD-10-CM

## 2018-09-26 DIAGNOSIS — F80.81 STUTTERING: ICD-10-CM

## 2018-09-26 DIAGNOSIS — R07.89 OTHER CHEST PAIN: Primary | ICD-10-CM

## 2018-09-26 NOTE — PROGRESS NOTES
Shon Sevilla is a 62 y.o. female    Issues discussed today include:    Chief Complaint   Patient presents with    Follow-up     Follow up visit   +  Disability paper work needs to be filled        1) Anxiety, chest pain:  Here for f/u. \"Says she feels like her body saying she can't do the things she used to do anymore. \" Like lifting the cat litter box was too heavy for her. And sometimes has decreased  strength in R hand, also with some tingling. Is L hand dominant. Still stuttering and drooling out of her R sided mouth. Had head CT last month that was normal.    She also c/o CP, palpitations and associated SOB. She reports having 2 catheterizations, thinks in 2001 and 2002, for chest pain. She had a stent placed and was told she had joy parkinson white syndrome. Also feels like her inside just want to jump out of her body. Data reviewed or ordered today:       Other problems include:  Patient Active Problem List   Diagnosis Code    Tympanic membrane rupture H72.90    Long Q-T syndrome I45.81    WPW (Fazal-Parkinson-White syndrome) I45.6    Panic disorder with agoraphobia F40.01    Generalized anxiety disorder F41.1    Migraine G43.909    Hyperlipidemia E78.5    Osteoporosis M81.0    Chest pain R07.9    Palpitations R00.2    Acquired hypothyroidism E03.9       Medications:  Current Outpatient Medications   Medication Sig Dispense Refill    diazePAM (VALIUM) 5 mg tablet Take 1 Tab by mouth every eight (8) hours as needed for Anxiety. Max Daily Amount: 15 mg. Don't fill til on/after 11/1. 90 Tab 1    varenicline (CHANTIX STARTER AYALA) 0.5 mg (11)- 1 mg (42) DsPk Take as directed on package. 1 Dose Pack 0    atenolol (TENORMIN) 25 mg tablet 1/2 daily 30 Tab 1    nitroglycerin (NITROSTAT) 0.4 mg SL tablet 1 Tab by SubLINGual route every five (5) minutes as needed for Chest Pain. For 2 doses.   If cp is unrelieved after second dose call 911. 1 Bottle 0    cyclobenzaprine (FLEXERIL) 10 mg tablet TAKE ONE TO ONE AND ONE-HALF TABLET BY MOUTH ONCE DAILY AS NEEDED FOR HEADACHE 30 Tab 2    aspirin delayed-release 81 mg tablet Take 1 Tab by mouth daily. 1 Tab 0       Allergies: Allergies   Allergen Reactions    Bacitracin Swelling    Bactrim [Sulfamethoprim Ds] Rash    Ciprofloxacin Swelling    Fosamax [Alendronate] Nausea Only    Imitrex [Sumatriptan Succinate] Other (comments)     Chest pain    Keflex [Cephalexin] Nausea and Vomiting    Pcn [Penicillins] Swelling    Trazodone Other (comments)     Nightmares       LMP:  No LMP recorded. Patient has had a hysterectomy.     Social History     Socioeconomic History    Marital status:      Spouse name: Not on file    Number of children: Not on file    Years of education: Not on file    Highest education level: Not on file   Social Needs    Financial resource strain: Not on file    Food insecurity - worry: Not on file    Food insecurity - inability: Not on file    Transportation needs - medical: Not on file   [employer] needs - non-medical: Not on file   Occupational History    Not on file   Tobacco Use    Smoking status: Current Every Day Smoker     Packs/day: 1.00     Years: 25.00     Pack years: 25.00     Types: Cigarettes    Smokeless tobacco: Never Used    Tobacco comment: 8 cig a day    Substance and Sexual Activity    Alcohol use: No     Alcohol/week: 0.0 oz    Drug use: No    Sexual activity: Not on file   Other Topics Concern    Not on file   Social History Narrative    Not on file       Family History   Problem Relation Age of Onset    Cancer Father         lung,  age 58         Physical Exam   Visit Vitals  /79 (BP 1 Location: Left arm, BP Patient Position: Sitting)   Pulse 89   Temp 98.1 °F (36.7 °C) (Oral)   Wt 94 lb (42.6 kg)   BMI 15.18 kg/m²      BP Readings from Last 3 Encounters:   10/15/18 124/80   18 110/79   18 102/73     Constitutional: Appears well,  No acute distress, Vitals noted  Psychiatric:  Affect normal, Alert and Oriented to person/place/time  Eyes:  Conjunctiva clear, no drainage  ENT:  External ears and nose normal, Mucous membranes moist  Neck:  General inspection normal. Supple. Heart:  Normal HR, Normal S1 and S2,  Regular rhythm. No murmurs, rubs or gallops. Lungs:  Clear to auscultation, good respiratory effort, no wheezes, rales or rhonchi  Extremities: Without edema, good peripheral pulses  Skin:  Warm to palpation, without rashes  Neuro: Stuttering speech, CNII-XII intact, symmetric and intact sensation to light touch throughout, equal and full motor strength in all extremities, DTRs symmetric and normal, no clonus      Assessment/Plan:      ICD-10-CM ICD-9-CM    1. Other chest pain R07.89 786.59 REFERRAL TO CARDIOLOGY   2. Stuttering F80.81 315.35 MRI BRAIN W WO CONT   3. Numbness and tingling of right arm R20.0 782.0 MRI BRAIN W WO CONT    R20.2         Will get MRI of the brain for more sensitive study to ensure no recent CVA, recent head CT noncontrast was neg  Given Chest pain, R arm tingling and h/o PCI, I am referring pt to cardiology via AN to re-establish care    Follow up: Keep upcoming appt with Dr. Ana Rosa Higuera (PCP)    MCKAYLA Quiñonez MD  [address] [place] [address]

## 2018-09-26 NOTE — LETTER
NOTIFICATION for COURT SYSTEM 
9/26/2018 12:32 PM 
 
Ms. Jaison Rondon [address] [address] To Whom It May Concern: 
 
Jaison Rondon is currently under the care of [address]. In my medical opinion, she should not ever serve on jury duty. This would greatly exacerbate her psychiatric and physical conditions. If there are questions or concerns please have the patient contact our office. Sincerely, Jessica Rivers MD

## 2018-09-26 NOTE — PROGRESS NOTES
Per provider request, the patient's completed employment physical was faxed to [employer] at [phone] and fax confirmation received. The original documents email scanned to Angelica Moe. Rafael Yates RN      Per provider request, the patient's signed request for medical records from [facility] was faxed to them today ([phone]) and fax confirmation received.  Rafael Yates RN

## 2018-10-04 ENCOUNTER — HOSPITAL ENCOUNTER (OUTPATIENT)
Dept: MRI IMAGING | Age: 58
Discharge: HOME | End: 2018-10-04
Attending: FAMILY MEDICINE
Payer: SELF-PAY

## 2018-10-04 DIAGNOSIS — R07.89 OTHER CHEST PAIN: ICD-10-CM

## 2018-10-04 PROCEDURE — 74011250636 HC RX REV CODE- 250/636: Performed by: RADIOLOGY

## 2018-10-04 PROCEDURE — A9575 INJ GADOTERATE MEGLUMI 0.1ML: HCPCS | Performed by: RADIOLOGY

## 2018-10-04 PROCEDURE — 70553 MRI BRAIN STEM W/O & W/DYE: CPT

## 2018-10-04 RX ORDER — GADOTERATE MEGLUMINE 376.9 MG/ML
8 INJECTION INTRAVENOUS
Status: COMPLETED | OUTPATIENT
Start: 2018-10-04 | End: 2018-10-04

## 2018-10-04 RX ADMIN — GADOTERATE MEGLUMINE 8 ML: 376.9 INJECTION INTRAVENOUS at 18:47

## 2018-10-08 ENCOUNTER — TELEPHONE (OUTPATIENT)
Dept: FAMILY MEDICINE CLINIC | Age: 58
End: 2018-10-08

## 2018-10-08 NOTE — PROGRESS NOTES
MRI brain w/wo contrast showed normal brain tissue without evidence of stroke, mass or bleeding  Incidental finding of R mastoid (aread behind the ear) effusion (fluid collection)   ** Unsure if patient has been having R ear pain, drainage, hearing loss, any other sxs as we didn't discuss this at [address] [place] [address] Avenue - if so, we should examine that ear to ensure no reason to tx with abx or send her to ENT    Routing to [facility] to inform pt of results and inquire about ear sxs, thank you  Also routing to provider who is to see pt on 10/15/18

## 2018-10-10 RX ORDER — ATENOLOL 25 MG/1
TABLET ORAL
Qty: 30 TAB | Refills: 1 | Status: SHIPPED | OUTPATIENT
Start: 2018-10-10 | End: 2018-12-04

## 2018-10-10 NOTE — PROGRESS NOTES
She has a chronic right TM rupture, with recurrent draining. Has refused ENT eval, abx resolve sx. I will discuss this result with her when I call about other results.

## 2018-10-11 DIAGNOSIS — H72.91 PERFORATION OF RIGHT TYMPANIC MEMBRANE: ICD-10-CM

## 2018-10-11 DIAGNOSIS — I45.6 WPW (WOLFF-PARKINSON-WHITE SYNDROME): Primary | ICD-10-CM

## 2018-10-11 NOTE — PROGRESS NOTES
Discussed neg head MRI except mastoid inflamm, and also the holter, which showed frequent PVCs. She does agree to cardiology and ENT eval at this time. Will try AN and pt will likely qualify now since she lost her job. Reports she had a lot of ear drainage around the time of the MRI which has since reolved without abx.

## 2018-10-15 ENCOUNTER — OFFICE VISIT (OUTPATIENT)
Dept: FAMILY MEDICINE CLINIC | Age: 58
End: 2018-10-15

## 2018-10-15 VITALS
WEIGHT: 95 LBS | HEART RATE: 74 BPM | BODY MASS INDEX: 15.34 KG/M2 | DIASTOLIC BLOOD PRESSURE: 80 MMHG | TEMPERATURE: 98.4 F | SYSTOLIC BLOOD PRESSURE: 124 MMHG

## 2018-10-15 DIAGNOSIS — Z72.0 TOBACCO ABUSE: ICD-10-CM

## 2018-10-15 DIAGNOSIS — H72.91 CHRONIC TUBOTYMPANIC DISEASE WITH ANTERIOR PERFORATION OF EAR DRUM, RIGHT: ICD-10-CM

## 2018-10-15 DIAGNOSIS — H66.11 CHRONIC TUBOTYMPANIC DISEASE WITH ANTERIOR PERFORATION OF EAR DRUM, RIGHT: ICD-10-CM

## 2018-10-15 DIAGNOSIS — R07.89 OTHER CHEST PAIN: Primary | ICD-10-CM

## 2018-10-15 DIAGNOSIS — F41.9 ANXIETY: ICD-10-CM

## 2018-10-15 DIAGNOSIS — I45.6 WOLFF PARKINSON WHITE PATTERN SEEN ON ELECTROCARDIOGRAM: ICD-10-CM

## 2018-10-15 RX ORDER — VARENICLINE TARTRATE 25 MG
KIT ORAL
Qty: 1 DOSE PACK | Refills: 0 | Status: SHIPPED | OUTPATIENT
Start: 2018-10-15 | End: 2018-12-03 | Stop reason: SDUPTHER

## 2018-10-15 RX ORDER — DIAZEPAM 5 MG/1
5 TABLET ORAL
Qty: 90 TAB | Refills: 1 | Status: SHIPPED | OUTPATIENT
Start: 2018-10-15 | End: 2019-01-03 | Stop reason: SDUPTHER

## 2018-10-15 NOTE — PROGRESS NOTES
Subjective:     Chief Complaint   Patient presents with    Results     from MRI      she is a 62y.o. year old female who presents for evalution. 1. PVCs-- started atenolol last week, heart rate is better and a little less irreg heart beat. 2.  Right ear is not draining currently. Dunia Jackson Has intermittent mastoid pain and has for some time. 3.  Thinks she's ready to quit smoking.  is with her today and they plan to quit together. She wants to use chantix again which she did well with last time. Past Medical History:   Diagnosis Date    History of mammography, screening 2012    Normal    Ill-defined condition     WPW    Pap smear for cervical cancer screening several years             Objective:     Vitals:    10/15/18 1037   BP: 124/80   Pulse: 74   Temp: 98.4 °F (36.9 °C)   TempSrc: Oral   Weight: 95 lb (43.1 kg)       Physical Examination: General appearance - alert, well appearing, and in no distress and wt. Stable. Stutter better and looks better. Mental status - more relaxed. Ears - right TM continues to have a perf with whitish material at the perf. Tender mastoid area on right  Chest - clear to auscultation, no wheezes, rales or rhonchi, symmetric air entry  Heart - normal rate, regular rhythm, normal S1, S2, no murmurs, rubs, clicks or gallops. Rate 70s-80s and reg, no PVCs. Assessment/ Plan:     The encounter diagnosis was Anxiety. Continue valium tid prn. Right chronic TM perf with mastoiditis-- agreed to ENT referral and that is in. Very frequent PVCs by recent Holter, with WPW and recent nl stress imaging. Has card consult in. Continue on current dose of atenolol. F/u 2 months. No results found for any visits on 10/15/18. Orders Placed This Encounter    diazePAM (VALIUM) 5 mg tablet     Sig: Take 1 Tab by mouth every eight (8) hours as needed for Anxiety. Max Daily Amount: 15 mg. Don't fill til on/after 11/1.      Dispense:  90 Tab     Refill:  1    varenicline (CHANTIX STARTER AYALA) 0.5 mg (11)- 1 mg (42) DsPk     Sig: Take as directed on package. Dispense:  1 Dose Pack     Refill:  0   start chantix for tobacco cessation.         Follow-up Disposition: Not on File

## 2018-10-15 NOTE — PROGRESS NOTES
Coordination of Care  1. Have you been to the ER, urgent care clinic since your last visit? Hospitalized since your last visit? No    2. Have you seen or consulted any other health care providers outside of the [address] [place] [address] since your last visit? Include any pap smears or colon screening. No    Does the patient need refills? YES    Learning Assessment Complete?  yes  Depression Screening complete in the past 12 months? yes

## 2018-10-19 ENCOUNTER — TELEPHONE (OUTPATIENT)
Dept: FAMILY MEDICINE CLINIC | Age: 58
End: 2018-10-19

## 2018-10-19 NOTE — TELEPHONE ENCOUNTER
Patient is requesting a call back from Dr. Natasha Sethi that her right ear is infected.  Patient declined appt for today 10/19/18 @2:55pm.

## 2018-10-23 ENCOUNTER — OFFICE VISIT (OUTPATIENT)
Dept: FAMILY MEDICINE CLINIC | Age: 58
End: 2018-10-23

## 2018-10-23 VITALS
BODY MASS INDEX: 15.5 KG/M2 | TEMPERATURE: 98.6 F | SYSTOLIC BLOOD PRESSURE: 125 MMHG | HEART RATE: 76 BPM | DIASTOLIC BLOOD PRESSURE: 88 MMHG | WEIGHT: 96 LBS

## 2018-10-23 DIAGNOSIS — Z23 ENCOUNTER FOR IMMUNIZATION: ICD-10-CM

## 2018-10-23 DIAGNOSIS — H65.01 RIGHT ACUTE SEROUS OTITIS MEDIA, RECURRENCE NOT SPECIFIED: Primary | ICD-10-CM

## 2018-10-23 RX ORDER — AZITHROMYCIN 250 MG/1
TABLET, FILM COATED ORAL
Qty: 6 TAB | Refills: 0 | Status: SHIPPED | OUTPATIENT
Start: 2018-10-23 | End: 2018-10-28

## 2018-10-23 NOTE — PATIENT INSTRUCTIONS
Middle Ear Fluid: Care Instructions  Your Care Instructions    Fluid often builds up inside the ear during a cold or allergies. Usually the fluid drains away, but sometimes a small tube in the ear, called the eustachian tube, stays blocked for months. Symptoms of fluid buildup may include:  · Popping, ringing, or a feeling of fullness or pressure in the ear. · Trouble hearing. · Balance problems and dizziness. In most cases, you can treat yourself at home. Follow-up care is a key part of your treatment and safety. Be sure to make and go to all appointments, and call your doctor if you are having problems. It's also a good idea to know your test results and keep a list of the medicines you take. How can you care for yourself at home? · In most cases, the fluid clears up within a few months without treatment. You may need more tests if the fluid does not clear up after 3 months. · If your doctor prescribed antibiotics, take them as directed. Do not stop taking them just because you feel better. You need to take the full course of antibiotics. When should you call for help? Call your doctor now or seek immediate medical care if:    · You have symptoms of infection, such as:  ? Increased pain, swelling, warmth, or redness. ? Pus draining from the area. ? A fever.    Watch closely for changes in your health, and be sure to contact your doctor if:    · You notice changes in hearing.     · You do not get better as expected. Where can you learn more? Go to http://vinita-jade.info/. Enter L632 in the search box to learn more about \"Middle Ear Fluid: Care Instructions. \"  Current as of: March 28, 2018  Content Version: 11.8  © 3317-7416 [employer]. Care instructions adapted under license by [employer] (which disclaims liability or warranty for this information).  If you have questions about a medical condition or this instruction, always ask your healthcare professional. [address] any warranty or liability for your use of this information.

## 2018-10-23 NOTE — PROGRESS NOTES
Coordination of Care  1. Have you been to the ER, urgent care clinic since your last visit? Hospitalized since your last visit? No    2. Have you seen or consulted any other health care providers outside of the [address] [place] [address] since your last visit? Include any pap smears or colon screening. No    Does the patient need refills? YES    Learning Assessment Complete?  yes

## 2018-10-23 NOTE — PROGRESS NOTES
Patient given FLU vaccine. Patient denied fever and tolerated vaccine well. Patient verbalized understanding of possible side effects from vaccine and when to seek emergency medical treatment. [facility] information sheet given to patient. Patient had no adverse reaction at time of discharge.  Gerardo Willis RN

## 2018-10-23 NOTE — PROGRESS NOTES
Assessment/Plan:    Diagnoses and all orders for this visit:    1. Right acute serous otitis media, recurrence not specified  -     azithromycin (ZITHROMAX) 250 mg tablet; Take 2 tablets today, then take 1 tablet daily        Follow-up Disposition:  Return for as scheduled . MARIXA La expressed understanding of this plan. An AVS was printed and given to the patient.      ----------------------------------------------------------------------    Chief Complaint   Patient presents with    Ear Pain     Right Ear pain X about 5 day       History of Present Illness:    Pt here for worsening of a chronic right ear problem. In the past 5 days, the ear has started to drain dark fluid. The ear is painful. She is in the process of getting in to see ENT but this has not happened yet. She denies fever, cough, runny nose. I had spoken to her PCP about her today and the recommendation at this point we both agreed would be to go ahead and treat with antibiotics. Hopefully the ENT referral will happen sooner then later and then a more definitive plan can be made about this chronic problem    She requests a flu shot today  The pt and I spent time discussing her grief over the loss of her little brother earlier this year. She cried and we talked about her support systems which are: Allegra, her  and a facebook support group. Past Medical History:   Diagnosis Date    History of mammography, screening 2012    Normal    Ill-defined condition     WPW    Pap smear for cervical cancer screening several years       Current Outpatient Medications   Medication Sig Dispense Refill    azithromycin (ZITHROMAX) 250 mg tablet Take 2 tablets today, then take 1 tablet daily 6 Tab 0    diazePAM (VALIUM) 5 mg tablet Take 1 Tab by mouth every eight (8) hours as needed for Anxiety. Max Daily Amount: 15 mg.  Don't fill til on/after 11/1. 90 Tab 1    varenicline (CHANTIX STARTER AYALA) 0.5 mg (11)- 1 mg (42) DsPk Take as directed on package. 1 Dose Pack 0    atenolol (TENORMIN) 25 mg tablet 1/2 daily 30 Tab 1    nitroglycerin (NITROSTAT) 0.4 mg SL tablet 1 Tab by SubLINGual route every five (5) minutes as needed for Chest Pain. For 2 doses. If cp is unrelieved after second dose call 911. 1 Bottle 0    cyclobenzaprine (FLEXERIL) 10 mg tablet TAKE ONE TO ONE AND ONE-HALF TABLET BY MOUTH ONCE DAILY AS NEEDED FOR HEADACHE 30 Tab 2    aspirin delayed-release 81 mg tablet Take 1 Tab by mouth daily.  1 Tab 0       Allergies   Allergen Reactions    Bacitracin Swelling    Bactrim [Sulfamethoprim Ds] Rash    Ciprofloxacin Swelling    Fosamax [Alendronate] Nausea Only    Imitrex [Sumatriptan Succinate] Other (comments)     Chest pain    Keflex [Cephalexin] Nausea and Vomiting    Pcn [Penicillins] Swelling    Trazodone Other (comments)     Nightmares       Social History     Tobacco Use    Smoking status: Current Every Day Smoker     Packs/day: 1.00     Years: 25.00     Pack years: 25.00     Types: Cigarettes    Smokeless tobacco: Never Used    Tobacco comment: 8 cig a day    Substance Use Topics    Alcohol use: No     Alcohol/week: 0.0 oz    Drug use: No       Family History   Problem Relation Age of Onset    Cancer Father         lung,  age 58       Physical Exam:     Visit Vitals  /88 (BP 1 Location: Right arm, BP Patient Position: Sitting)   Pulse 76   Temp 98.6 °F (37 °C) (Oral)   Wt 96 lb (43.5 kg)   BMI 15.50 kg/m²       A&Ox3  WDWN NAD  Respirations normal and non labored  Right TM there is dark fluid behind the bottom 1/3 of the membrane, there is fluid in the canal

## 2018-11-15 ENCOUNTER — TELEPHONE (OUTPATIENT)
Dept: FAMILY MEDICINE CLINIC | Age: 58
End: 2018-11-15

## 2018-11-15 NOTE — TELEPHONE ENCOUNTER
Car Knott at [facility], [phone], Ext. [phone], left message that Dr. Herber Ludwig is seeing this patient on Monday and they would like for us to o fax to them at [phone], any medical records we have, including EKG, that would explain the reason they are seeing this patient, Harjinder Mary, who was referred by Dr. Effie Clemente.     Marek Vee April

## 2018-11-16 NOTE — TELEPHONE ENCOUNTER
This is an [facility] referral. Return call made to DAVIAN at [facility]. She confirmed that they have received the referral packet from  and have all of the records they need for the patient's appointment there on 11-19-18.  Mikhail Sanderson RN

## 2018-11-27 ENCOUNTER — TELEPHONE (OUTPATIENT)
Dept: FAMILY MEDICINE CLINIC | Age: 58
End: 2018-11-27

## 2018-11-27 NOTE — TELEPHONE ENCOUNTER
Called pt [facility] [address] clinic closure  Pt has received letter and is going to continue care at [facility]

## 2018-12-03 NOTE — TELEPHONE ENCOUNTER
Pt called and though she is coming tomorrow to see Dr. Gavi Hopper she is asking for her Chantix to be sent as refill today to [facility] since she will be out today of this medication. She is very motivated to quit smoking. She has tried calling [facility] and has not had a return phone call from them since her calls last week.  Fadi Carey RN

## 2018-12-04 ENCOUNTER — OFFICE VISIT (OUTPATIENT)
Dept: FAMILY MEDICINE CLINIC | Age: 58
End: 2018-12-04

## 2018-12-04 ENCOUNTER — HOSPITAL ENCOUNTER (OUTPATIENT)
Dept: LAB | Age: 58
Discharge: HOME | End: 2018-12-04

## 2018-12-04 VITALS
HEART RATE: 74 BPM | DIASTOLIC BLOOD PRESSURE: 96 MMHG | SYSTOLIC BLOOD PRESSURE: 147 MMHG | BODY MASS INDEX: 16.47 KG/M2 | WEIGHT: 102 LBS | TEMPERATURE: 98.2 F

## 2018-12-04 DIAGNOSIS — R00.2 PALPITATIONS: ICD-10-CM

## 2018-12-04 DIAGNOSIS — R00.2 PALPITATIONS: Primary | ICD-10-CM

## 2018-12-04 LAB
ANION GAP SERPL CALC-SCNC: 5 MMOL/L (ref 5–15)
BUN SERPL-MCNC: 7 MG/DL (ref 6–20)
BUN/CREAT SERPL: 13 (ref 12–20)
CALCIUM SERPL-MCNC: 8.8 MG/DL (ref 8.5–10.1)
CHLORIDE SERPL-SCNC: 106 MMOL/L (ref 97–108)
CO2 SERPL-SCNC: 30 MMOL/L (ref 21–32)
CREAT SERPL-MCNC: 0.54 MG/DL (ref 0.55–1.02)
GLUCOSE SERPL-MCNC: 96 MG/DL (ref 65–100)
POTASSIUM SERPL-SCNC: 4 MMOL/L (ref 3.5–5.1)
SODIUM SERPL-SCNC: 141 MMOL/L (ref 136–145)

## 2018-12-04 PROCEDURE — 80048 BASIC METABOLIC PNL TOTAL CA: CPT

## 2018-12-04 PROCEDURE — 82306 VITAMIN D 25 HYDROXY: CPT

## 2018-12-04 RX ORDER — ATENOLOL 25 MG/1
TABLET ORAL
COMMUNITY
Start: 2018-12-04 | End: 2019-01-03 | Stop reason: SDUPTHER

## 2018-12-04 RX ORDER — VARENICLINE TARTRATE 25 MG
KIT ORAL
Qty: 1 DOSE PACK | Refills: 0 | Status: SHIPPED | OUTPATIENT
Start: 2018-12-04 | End: 2019-01-25

## 2018-12-04 NOTE — PROGRESS NOTES
Subjective:     Chief Complaint   Patient presents with    Follow-up     follow up visit     she is a 62y.o. year old female who presents for evalution. 1.  Anxiety and grief since her brother . She thinks she is doing better. Has plenty of diazepam.  2. Tobacco use-- down to 5 cigs/day, on chantix, wants refill. 3. Saw card and had nl echo per pt, scheduled for JAREN and has f/u with card . Card wants bmp done today. 4.  Applied for disability, no exam yet, and we haven't received record request.  Awaiting ENT eval through [facility]. Hasn't applied for medicaid. Past Medical History:   Diagnosis Date    History of mammography, screening     Normal    Ill-defined condition     WPW    Pap smear for cervical cancer screening several years             Objective:     Vitals:    18 1052 18 1055   BP: (!) 142/96 (!) 147/96   Pulse: 72 74   Temp: 98.2 °F (36.8 °C)    TempSrc: Oral    Weight: 102 lb (46.3 kg)        Physical Examination: General appearance - alert, well appearing, and in no distress and wt. Is up several lb. Calmer and less stutter today. Chest - clear to auscultation, no wheezes, rales or rhonchi, symmetric air entry  Heart - normal rate, regular rhythm, normal S1, S2, no murmurs, rubs, clicks or gallops  Extremities - no pedal edema noted      Assessment/ Plan:     1. H/o wpw and palpitations, continue card f/u, check lab today and will fax result to the card. Phone number is [phone], dr. Torrey Scott, please call for fax number. 2.  Smoker, refilled chantix. 3.  Anxiety, stable. Orders Placed This Encounter    METABOLIC PANEL, BASIC     Please send result to Dr. Kary Alejandro, phone is [phone] is the phone number. Standing Status:   Future     Standing Expiration Date:   2019    atenolol (TENORMIN) 25 mg tablet     Si/2 bid per card           Follow-up Disposition:  Return for 2 months cav or new clinic tickler.

## 2018-12-04 NOTE — PROGRESS NOTES
Coordination of Care  1. Have you been to the ER, urgent care clinic since your last visit? Hospitalized since your last visit? No    2. Have you seen or consulted any other health care providers outside of the [address] [place] [address] since your last visit? Include any pap smears or colon screening. No    Does the patient need refills? YES    Learning Assessment Complete?  yes

## 2018-12-05 LAB — 25(OH)D3 SERPL-MCNC: 13.2 NG/ML (ref 30–100)

## 2018-12-05 RX ORDER — ERGOCALCIFEROL 1.25 MG/1
50000 CAPSULE ORAL
Qty: 12 CAP | Refills: 3 | Status: SHIPPED | OUTPATIENT
Start: 2018-12-05 | End: 2018-12-06 | Stop reason: SDUPTHER

## 2018-12-05 NOTE — PROGRESS NOTES
Call and s/w pt regarding lab results. Pt is aware of the Vit D level and will . Copy of labs sent to Cardiologist Dr Bairon Fine     Pt mentioned she did not get her rx for the Valium, please refill.

## 2018-12-05 NOTE — PROGRESS NOTES
Vit D level is very low, I am sending rx for vit d, 1 weekly for 8 weeks, then 1 monthly. Very important for the bones.

## 2018-12-06 ENCOUNTER — TELEPHONE (OUTPATIENT)
Dept: FAMILY MEDICINE CLINIC | Age: 58
End: 2018-12-06

## 2018-12-06 RX ORDER — ERGOCALCIFEROL 1.25 MG/1
50000 CAPSULE ORAL
Qty: 12 CAP | Refills: 3 | Status: SHIPPED | OUTPATIENT
Start: 2018-12-06 | End: 2019-07-01 | Stop reason: SDUPTHER

## 2018-12-06 NOTE — TELEPHONE ENCOUNTER
I looked at valium bottle yesterday, has 1 reill and many tabs in bottle. We discussed at that time that she doesn't need a refill.

## 2018-12-06 NOTE — TELEPHONE ENCOUNTER
Message received from the registrar stating that the patient called [facility] this morning asking about a prescription that was sent in for her yesterday. Per chart review, Ergocalciferol was sent to [facility] yesterday. Reviewed this with Dr. Wilda Fernandez who is at [facility] today and stated that the prescription should have been sent to patient's [facility] DR DARLINE MENDENHALL. Return call made to the patient to review and she confirmed that the [facility] in [place] is where she would like the Ergocalciferol prescription to go. This will be done today and the medication should be ready for pick-up this afternoon. The patient then asked for a refill of her Valium. Will send this message to Dr. Wilda Fernandez for review.  Matthew Zapata RN

## 2018-12-14 NOTE — TELEPHONE ENCOUNTER
Prescription for Valium 5mg tablet #60 with 1 refill do not fill until or after 01/05/2019 was called in by this nurse to patient's [address] [place] [address] in [facility] per Dr. Pradeep Lawrence request. Patient has been notified that rx has been called in and that she should be able to get after 01/05/2019. Patient verbalized understanding.

## 2018-12-26 ENCOUNTER — APPOINTMENT (OUTPATIENT)
Dept: CT IMAGING | Age: 58
End: 2018-12-26
Attending: EMERGENCY MEDICINE
Payer: SELF-PAY

## 2018-12-26 ENCOUNTER — APPOINTMENT (OUTPATIENT)
Dept: GENERAL RADIOLOGY | Age: 58
End: 2018-12-26
Attending: PHYSICIAN ASSISTANT
Payer: SELF-PAY

## 2018-12-26 ENCOUNTER — HOSPITAL ENCOUNTER (EMERGENCY)
Age: 58
Discharge: HOME | End: 2018-12-26
Attending: EMERGENCY MEDICINE
Payer: SELF-PAY

## 2018-12-26 VITALS
BODY MASS INDEX: 15.99 KG/M2 | WEIGHT: 99 LBS | TEMPERATURE: 98.1 F | RESPIRATION RATE: 17 BRPM | OXYGEN SATURATION: 98 % | HEART RATE: 88 BPM | SYSTOLIC BLOOD PRESSURE: 121 MMHG | DIASTOLIC BLOOD PRESSURE: 76 MMHG

## 2018-12-26 DIAGNOSIS — R25.1 EPISODE OF SHAKING: Primary | ICD-10-CM

## 2018-12-26 LAB
ANION GAP SERPL CALC-SCNC: 10 MMOL/L (ref 5–15)
APPEARANCE UR: CLEAR
BACTERIA URNS QL MICRO: NEGATIVE /HPF
BASOPHILS # BLD: 0.1 K/UL (ref 0–0.1)
BASOPHILS NFR BLD: 1 % (ref 0–1)
BILIRUB UR QL: NEGATIVE
BUN SERPL-MCNC: 9 MG/DL (ref 6–20)
BUN/CREAT SERPL: 14 (ref 12–20)
CALCIUM SERPL-MCNC: 9.4 MG/DL (ref 8.5–10.1)
CHLORIDE SERPL-SCNC: 104 MMOL/L (ref 97–108)
CO2 SERPL-SCNC: 28 MMOL/L (ref 21–32)
COLOR UR: NORMAL
COMMENT, HOLDF: NORMAL
CREAT SERPL-MCNC: 0.63 MG/DL (ref 0.55–1.02)
DIFFERENTIAL METHOD BLD: NORMAL
EOSINOPHIL # BLD: 0.1 K/UL (ref 0–0.4)
EOSINOPHIL NFR BLD: 2 % (ref 0–7)
EPITH CASTS URNS QL MICRO: NORMAL /LPF
ERYTHROCYTE [DISTWIDTH] IN BLOOD BY AUTOMATED COUNT: 12.3 % (ref 11.5–14.5)
GLUCOSE SERPL-MCNC: 103 MG/DL (ref 65–100)
GLUCOSE UR STRIP.AUTO-MCNC: NEGATIVE MG/DL
HCT VFR BLD AUTO: 41.6 % (ref 35–47)
HGB BLD-MCNC: 14.6 G/DL (ref 11.5–16)
HGB UR QL STRIP: NEGATIVE
HYALINE CASTS URNS QL MICRO: NORMAL /LPF (ref 0–5)
IMM GRANULOCYTES # BLD: 0 K/UL (ref 0–0.04)
IMM GRANULOCYTES NFR BLD AUTO: 0 % (ref 0–0.5)
KETONES UR QL STRIP.AUTO: NEGATIVE MG/DL
LEUKOCYTE ESTERASE UR QL STRIP.AUTO: NEGATIVE
LYMPHOCYTES # BLD: 1 K/UL (ref 0.8–3.5)
LYMPHOCYTES NFR BLD: 21 % (ref 12–49)
MAGNESIUM SERPL-MCNC: 2.1 MG/DL (ref 1.6–2.4)
MCH RBC QN AUTO: 32.2 PG (ref 26–34)
MCHC RBC AUTO-ENTMCNC: 35.1 G/DL (ref 30–36.5)
MCV RBC AUTO: 91.8 FL (ref 80–99)
MONOCYTES # BLD: 0.4 K/UL (ref 0–1)
MONOCYTES NFR BLD: 8 % (ref 5–13)
NEUTS SEG # BLD: 3 K/UL (ref 1.8–8)
NEUTS SEG NFR BLD: 68 % (ref 32–75)
NITRITE UR QL STRIP.AUTO: NEGATIVE
NRBC # BLD: 0 K/UL (ref 0–0.01)
NRBC BLD-RTO: 0 PER 100 WBC
PH UR STRIP: 5.5 [PH] (ref 5–8)
PLATELET # BLD AUTO: 236 K/UL (ref 150–400)
PMV BLD AUTO: 10.4 FL (ref 8.9–12.9)
POTASSIUM SERPL-SCNC: 4.2 MMOL/L (ref 3.5–5.1)
PROT UR STRIP-MCNC: NEGATIVE MG/DL
RBC # BLD AUTO: 4.53 M/UL (ref 3.8–5.2)
RBC #/AREA URNS HPF: NORMAL /HPF (ref 0–5)
SAMPLES BEING HELD,HOLD: NORMAL
SODIUM SERPL-SCNC: 142 MMOL/L (ref 136–145)
SP GR UR REFRACTOMETRY: 1.01 (ref 1–1.03)
UR CULT HOLD, URHOLD: NORMAL
UROBILINOGEN UR QL STRIP.AUTO: 0.2 EU/DL (ref 0.2–1)
WBC # BLD AUTO: 4.5 K/UL (ref 3.6–11)
WBC URNS QL MICRO: NORMAL /HPF (ref 0–4)

## 2018-12-26 PROCEDURE — 80048 BASIC METABOLIC PNL TOTAL CA: CPT

## 2018-12-26 PROCEDURE — 96374 THER/PROPH/DIAG INJ IV PUSH: CPT

## 2018-12-26 PROCEDURE — 93005 ELECTROCARDIOGRAM TRACING: CPT

## 2018-12-26 PROCEDURE — 87040 BLOOD CULTURE FOR BACTERIA: CPT

## 2018-12-26 PROCEDURE — 96361 HYDRATE IV INFUSION ADD-ON: CPT

## 2018-12-26 PROCEDURE — 71046 X-RAY EXAM CHEST 2 VIEWS: CPT

## 2018-12-26 PROCEDURE — 83735 ASSAY OF MAGNESIUM: CPT

## 2018-12-26 PROCEDURE — 74011250636 HC RX REV CODE- 250/636: Performed by: EMERGENCY MEDICINE

## 2018-12-26 PROCEDURE — 85025 COMPLETE CBC W/AUTO DIFF WBC: CPT

## 2018-12-26 PROCEDURE — 81001 URINALYSIS AUTO W/SCOPE: CPT

## 2018-12-26 PROCEDURE — 99284 EMERGENCY DEPT VISIT MOD MDM: CPT

## 2018-12-26 PROCEDURE — 36415 COLL VENOUS BLD VENIPUNCTURE: CPT

## 2018-12-26 PROCEDURE — 70450 CT HEAD/BRAIN W/O DYE: CPT

## 2018-12-26 RX ORDER — LORAZEPAM 2 MG/ML
1 INJECTION INTRAMUSCULAR
Status: COMPLETED | OUTPATIENT
Start: 2018-12-26 | End: 2018-12-26

## 2018-12-26 RX ADMIN — LORAZEPAM 1 MG: 2 INJECTION INTRAMUSCULAR; INTRAVENOUS at 16:14

## 2018-12-26 RX ADMIN — SODIUM CHLORIDE 1000 ML: 900 INJECTION, SOLUTION INTRAVENOUS at 16:13

## 2018-12-26 NOTE — ED NOTES
Pt came in via EMS with tremors.   Has hx and normally resolve with valium which she took but not resolveing

## 2018-12-26 NOTE — DISCHARGE INSTRUCTIONS
Comprehensive Metabolic Panel: About This Test  What is it? This blood test measures your sugar (glucose) level, electrolyte and fluid balance, kidney function, and liver function. Why is this test done? Your doctor may order this test as part of a regular health exam. Your doctor may use this test to check on a medical condition, such as high blood pressure, or to help diagnose a medical condition, such as diabetes. How can you prepare for the test?  · Your doctor may ask you not to eat or drink anything other than water for at least 8 hours before the blood sample is taken. What happens during the test?  · A health professional takes a sample of your blood. What else should you know about the test?  · Your results will include an explanation of what a \"normal\" result is. This is called a \"reference range. \" It is just a guide. Your doctor will evaluate your results based on your health and other factors. This means that a value that falls outside the normal values listed may still be normal for you. How long does the test take? · The test will take a few minutes. What happens after the test?  · You will probably be able to go home right away. · You can go back to your usual activities right away. Follow-up care is a key part of your treatment and safety. Be sure to make and go to all appointments, and call your doctor if you are having problems. It's also a good idea to keep a list of the medicines you take. Ask your doctor when you can expect to have your test results. Where can you learn more? Go to http://vinita-jade.info/. Enter [id number] in the search box to learn more about \"Comprehensive Metabolic Panel: About This Test.\"  Current as of: June 26, 2018  Content Version: 11.8  © 5279-6911 [employer]. Care instructions adapted under license by [employer] (which disclaims liability or warranty for this information).  If you have questions about a medical condition or this instruction, always ask your healthcare professional. [place] [address] any warranty or liability for your use of this information.

## 2018-12-26 NOTE — ED PROVIDER NOTES
12:59 PM  I have evaluated the patient as the Provider in Triage. I have reviewed Her vital signs and the triage nurse assessment. I have talked with the patient and any available family and advised that I am the provider in triage and have ordered the appropriate study to initiate their work up based on the clinical presentation during my assessment. I have advised that the patient will be accommodated in the Main ED as soon as possible. I have also requested to contact the triage nurse or myself immediately if the patient experiences any changes in their condition during this brief waiting period. Sueanne Bumpers, MD    Pt w/ tremors and shaking. H/o anxiety. Per  had had tremors that last 5-10 minutes but usually stops. Has seen neurology in the past due to stutter in her speech and tremors which began soon after her brother  in 2018 with neg brain MRI per patient.  states today she began with full-body tremors, never lost consciousness but they lasted 1-2 hours. No known fever, cough, vomiting, diarrhea, speech problem, dizziness, CP, SOB, urinary sx's. Patient has no complaints. Patient states only new medication is atenolol prescribed 1-2 months ago.     Surgical hx- see chart  Social hx- + tobacco, no ETOH    Sara Diana PA-C               Past Medical History:   Diagnosis Date    History of mammography, screening     Normal    Ill-defined condition     WPW    Pap smear for cervical cancer screening several years       Past Surgical History:   Procedure Laterality Date    HX HYSTERECTOMY           Family History:   Problem Relation Age of Onset   [place] [facility] Father         lung,  age 58       Social History     Socioeconomic History    Marital status:      Spouse name: Not on file    Number of children: Not on file    Years of education: Not on file    Highest education level: Not on file   Social Needs    Financial resource strain: Not on file   [place] insecurity - worry: Not on file    Food insecurity - inability: Not on file    Transportation needs - medical: Not on file   [place] needs - non-medical: Not on file   Occupational History    Not on file   Tobacco Use    Smoking status: Current Every Day Smoker     Packs/day: 1.00     Years: 25.00     Pack years: 25.00     Types: Cigarettes    Smokeless tobacco: Never Used    Tobacco comment: 8 cig a day    Substance and Sexual Activity    Alcohol use: No     Alcohol/week: 0.0 oz    Drug use: No    Sexual activity: Not on file   Other Topics Concern    Not on file   Social History Narrative    Not on file         ALLERGIES: Bacitracin; Bactrim [sulfamethoprim ds]; Ciprofloxacin; Fosamax [alendronate]; Imitrex [sumatriptan succinate]; Keflex [cephalexin]; Pcn [penicillins]; and Trazodone    Review of Systems   Constitutional: Negative. Negative for activity change, chills, fatigue and unexpected weight change. HENT: Negative for trouble swallowing. Respiratory: Negative for cough, chest tightness, shortness of breath and wheezing. Cardiovascular: Negative. Negative for chest pain and palpitations. Gastrointestinal: Negative. Negative for abdominal pain, diarrhea, nausea and vomiting. Genitourinary: Negative. Negative for dysuria, flank pain, frequency and hematuria. Musculoskeletal: Negative. Negative for arthralgias, back pain, neck pain and neck stiffness. Skin: Negative. Negative for color change and rash. Neurological: Positive for tremors. Negative for dizziness, numbness and headaches. All other systems reviewed and are negative. Vitals:    12/26/18 1257   BP: (!) 178/102   Pulse: (!) 103   Resp: 16   Temp: 98.7 °F (37.1 °C)   SpO2: 97%   Weight: 44.9 kg (99 lb)            Physical Exam   Constitutional: She is oriented to person, place, and time. She appears well-developed and well-nourished. She is active. Non-toxic appearance. No distress.    HENT:   Head: Normocephalic and atraumatic. Eyes: Conjunctivae are normal. Pupils are equal, round, and reactive to light. Right eye exhibits no discharge. Left eye exhibits no discharge. Neck: Normal range of motion and full passive range of motion without pain. No tracheal tenderness present. Cardiovascular: Normal rate, regular rhythm, normal heart sounds, intact distal pulses and normal pulses. Exam reveals no gallop and no friction rub. No murmur heard. Pulmonary/Chest: Effort normal and breath sounds normal. No respiratory distress. She has no wheezes. She has no rales. She exhibits no tenderness. Abdominal: Soft. Bowel sounds are normal. She exhibits no distension. There is no tenderness. There is no rebound and no guarding. Musculoskeletal: Normal range of motion. She exhibits no edema or tenderness. Neurological: She is alert and oriented to person, place, and time. She has normal strength. No cranial nerve deficit or sensory deficit. Coordination normal.   Occasional stutter, present since July per  since her brother    Skin: Skin is warm, dry and intact. Capillary refill takes less than 2 seconds. No abrasion and no rash noted. She is not diaphoretic. No erythema. Psychiatric: She has a normal mood and affect. Her speech is normal and behavior is normal. Cognition and memory are normal.   Nursing note and vitals reviewed. MDM  Number of Diagnoses or Management Options  Episode of shaking:   Diagnosis management comments:   Ddx: electrolyte abnormality, tremor, medication reaction       Amount and/or Complexity of Data Reviewed  Clinical lab tests: ordered and reviewed  Tests in the radiology section of CPT®: ordered and reviewed  Review and summarize past medical records: yes  Discuss the patient with other providers: yes    Patient Progress  Patient progress: stable         Procedures    The patient was seen by the supervising physician who was the provider in triage.  I discussed patient's PMH, exam findings as well as careplan with the attending who agrees with care plan. Mechelle Lynn PA-C    EKG interpretation:   Rhythm: normal sinus rhythm; and regular . Rate (approx.): 93; Axis: normal; P wave: normal; QRS interval: normal ; ST/T wave: non-specific changes; Other findings: abnormal ekg. Interpreted by Dr. Ruchi Alba's resolved prior to arrival.   Mechelle Lynn PA-C      MEDICATIONS GIVEN:  Medications   sodium chloride 0.9 % bolus infusion 1,000 mL (0 mL IntraVENous IV Completed 12/26/18 1813)   LORazepam (ATIVAN) injection 1 mg (1 mg IntraVENous Given 12/26/18 1614)         DISCHARGE NOTE:  The patient's results have been reviewed with them and/or available family. Patient and/or family verbally conveyed their understanding and agreement of the patient's signs, symptoms, diagnosis, treatment and prognosis and additionally agree to follow up as recommended in the discharge instructions or to return to the Emergency Room should their condition change prior to their follow-up appointment. The patient/family verbally agrees with the care-plan and verbally conveys that all of their questions have been answered. The discharge instructions have also been provided to the patient and/or family with some educational information regarding the patient's diagnosis as well a list of reasons why the patient would want to return to the ER prior to their follow-up appointment, should their condition change. Plan:  1. F/U with PCP, neurology  2.  Return precautions discussed and advised to return to ER if worse

## 2018-12-27 LAB
ATRIAL RATE: 93 BPM
CALCULATED P AXIS, ECG09: 54 DEGREES
CALCULATED R AXIS, ECG10: 80 DEGREES
CALCULATED T AXIS, ECG11: 29 DEGREES
DIAGNOSIS, 93000: NORMAL
P-R INTERVAL, ECG05: 88 MS
Q-T INTERVAL, ECG07: 396 MS
QRS DURATION, ECG06: 78 MS
QTC CALCULATION (BEZET), ECG08: 492 MS
VENTRICULAR RATE, ECG03: 93 BPM

## 2019-01-01 LAB
BACTERIA SPEC CULT: NORMAL
SERVICE CMNT-IMP: NORMAL

## 2019-01-03 ENCOUNTER — OFFICE VISIT (OUTPATIENT)
Dept: FAMILY MEDICINE CLINIC | Age: 59
End: 2019-01-03

## 2019-01-03 VITALS
BODY MASS INDEX: 15.95 KG/M2 | HEART RATE: 110 BPM | TEMPERATURE: 97.7 F | SYSTOLIC BLOOD PRESSURE: 144 MMHG | OXYGEN SATURATION: 96 % | DIASTOLIC BLOOD PRESSURE: 102 MMHG | WEIGHT: 98.8 LBS

## 2019-01-03 DIAGNOSIS — F41.9 ANXIETY: ICD-10-CM

## 2019-01-03 DIAGNOSIS — I10 HYPERTENSION, UNSPECIFIED TYPE: ICD-10-CM

## 2019-01-03 DIAGNOSIS — F80.81 STUTTERING: ICD-10-CM

## 2019-01-03 DIAGNOSIS — F17.200 TOBACCO DEPENDENCE: ICD-10-CM

## 2019-01-03 DIAGNOSIS — R63.6 UNDERWEIGHT: ICD-10-CM

## 2019-01-03 DIAGNOSIS — R25.1 TREMOR, UNSPECIFIED: Primary | ICD-10-CM

## 2019-01-03 RX ORDER — NITROGLYCERIN 0.4 MG/1
0.4 TABLET SUBLINGUAL
Qty: 1 BOTTLE | Refills: 1 | Status: SHIPPED | OUTPATIENT
Start: 2019-01-03 | End: 2022-09-22

## 2019-01-03 RX ORDER — VARENICLINE TARTRATE 1 MG/1
TABLET, FILM COATED ORAL
Qty: 60 TAB | Refills: 1 | Status: SHIPPED | OUTPATIENT
Start: 2019-01-03 | End: 2019-01-03 | Stop reason: SDUPTHER

## 2019-01-03 RX ORDER — ATENOLOL 25 MG/1
TABLET ORAL
Qty: 135 TAB | Refills: 1 | Status: SHIPPED | OUTPATIENT
Start: 2019-01-03 | End: 2019-01-29

## 2019-01-03 RX ORDER — NITROGLYCERIN 0.4 MG/1
0.4 TABLET SUBLINGUAL
Qty: 1 BOTTLE | Refills: 0 | Status: SHIPPED | OUTPATIENT
Start: 2019-01-03 | End: 2019-01-03 | Stop reason: SDUPTHER

## 2019-01-03 RX ORDER — VARENICLINE TARTRATE 1 MG/1
TABLET, FILM COATED ORAL
Qty: 60 TAB | Refills: 1 | Status: SHIPPED | OUTPATIENT
Start: 2019-01-03 | End: 2019-03-22 | Stop reason: ALTCHOICE

## 2019-01-03 RX ORDER — ATENOLOL 25 MG/1
TABLET ORAL
Qty: 45 TAB | Refills: 0 | Status: SHIPPED | OUTPATIENT
Start: 2019-01-03 | End: 2019-01-03 | Stop reason: SDUPTHER

## 2019-01-03 RX ORDER — DIAZEPAM 5 MG/1
5 TABLET ORAL
Qty: 90 TAB | Refills: 0 | Status: SHIPPED | OUTPATIENT
Start: 2019-02-03 | End: 2019-01-25 | Stop reason: SDUPTHER

## 2019-01-03 RX ORDER — DIAZEPAM 5 MG/1
5 TABLET ORAL
Qty: 90 TAB | Refills: 0 | Status: SHIPPED | OUTPATIENT
Start: 2019-01-03 | End: 2019-01-03 | Stop reason: SDUPTHER

## 2019-01-03 RX ORDER — MIRTAZAPINE 15 MG/1
15 TABLET, FILM COATED ORAL
Qty: 90 TAB | Refills: 3 | Status: SHIPPED | OUTPATIENT
Start: 2019-01-03 | End: 2019-02-22

## 2019-01-03 RX ORDER — MIRTAZAPINE 15 MG/1
15 TABLET, FILM COATED ORAL
Qty: 30 TAB | Refills: 0 | Status: SHIPPED | OUTPATIENT
Start: 2019-01-03 | End: 2019-01-03 | Stop reason: SDUPTHER

## 2019-01-03 NOTE — PROGRESS NOTES
AVS given along with printed RX for valium (to be filled 01/03/2019, and 02/03/2019), the following was also discussed during nursing discharge. - Referral to neurology via [facility], meenakshi help with making appt and give med assist handout to call and check in about care card ancelmo status (applied after MRI done) -Nurse helped patient to schedule appt. With [facility] clinic in [place], patient agrees with date, time and locations of appt. Clinic location and appt. Information given to patient in writing. Patient aware that she will have to bring $100 for down payment, ID, medication list and arrive at 10:30am.   - Start remeron 15mg nightly   - Continue valium 5mg tid prn   - Increase atenolol to 25mg qam and 12.5mg qpm (from 12.5mg bid previously) to help with BP, HR, tremors, migraines     Med explanation:   - Pt has coupon to cover meds for 1 month supply at [facility] on [address], SO several rx's sent to that pharmacy for 30 day supply + valium printed rx   - 3 mo supply of meds also sent to [facility] in [address] and [facility]ix [facility] to [facility].  Dian Brown RN

## 2019-01-03 NOTE — PROGRESS NOTES
Sacha Easley is a 62 y.o. female    Issues discussed today include:    Chief Complaint   Patient presents with    Medication Refill     1) ER follow up: Was in the ER on 12/26, day after Gold with worsening tremors. Per pt and  the tremors have usually been intermittent, but on 12/26 she couldn't stop shaking for hours so they went to ER. No LOC or fall. Admits she may have been more anxious d/t it being around the holiday with her brother not here. She continues to stutter as before, no improvement there. She her first appt with neurology on 1/29. She feels the valium does help with her anxiety (started 8/2018), where she thinks the klonopin has stopped working. Feels the ativan given in the ER helped a lot with her anxiety. She has been checking BP at home, ranging 130-140s/. HR has been in the 90s to low 100s. She is taking atenolol. She is also nearly finished with her chantix rx, would like to continue bc feels it is working. Stopped smoking few weeks before Thanksgiving and says she really has not desire to  a cigarette. Denies CP, SOB, focal weakness, vision changes. Data reviewed or ordered today:       Other problems include:  Patient Active Problem List   Diagnosis Code    Tympanic membrane rupture H72.90    Long Q-T syndrome I45.81    WPW (Fazal-Parkinson-White syndrome) I45.6    Panic disorder with agoraphobia F40.01    Generalized anxiety disorder F41.1    Migraine G43.909    Hyperlipidemia E78.5    Osteoporosis M81.0    Chest pain R07.9    Palpitations R00.2    Acquired hypothyroidism E03.9       Medications:  Current Outpatient Medications   Medication Sig Dispense Refill    varenicline (CHANTIX) 1 mg tablet Take 1 tabs twice daily 60 Tab 1    mirtazapine (REMERON) 15 mg tablet Take 1 Tab by mouth nightly.  For anxiety 90 Tab 3    atenolol (TENORMIN) 25 mg tablet 1 tab in the morning and 1/2 tab at night 135 Tab 1    [START ON 2/3/2019] diazePAM (VALIUM) 5 mg tablet Take 1 Tab by mouth every eight (8) hours as needed for Anxiety. Max Daily Amount: 15 mg. Not to fill before 2/3/19 90 Tab 0    nitroglycerin (NITROSTAT) 0.4 mg SL tablet 1 Tab by SubLINGual route every five (5) minutes as needed for Chest Pain. For 2 doses. If cp is unrelieved after second dose call 911. 1 Bottle 1    ergocalciferol (ERGOCALCIFEROL) 50,000 unit capsule Take 1 Cap by mouth every seven (7) days. For 8 weeks and then once monthly. 12 Cap 3    varenicline (CHANTIX STARTER AYALA) 0.5 mg (11)- 1 mg (42) DsPk Take as directed on package. 1 Dose Pack 0    cyclobenzaprine (FLEXERIL) 10 mg tablet TAKE ONE TO ONE AND ONE-HALF TABLET BY MOUTH ONCE DAILY AS NEEDED FOR HEADACHE 30 Tab 2    aspirin delayed-release 81 mg tablet Take 1 Tab by mouth daily. 1 Tab 0       Allergies: Allergies   Allergen Reactions    Bacitracin Swelling    Bactrim [Sulfamethoprim Ds] Rash    Ciprofloxacin Swelling    Fosamax [Alendronate] Nausea Only    Imitrex [Sumatriptan Succinate] Other (comments)     Chest pain    Keflex [Cephalexin] Nausea and Vomiting    Pcn [Penicillins] Swelling    Trazodone Other (comments)     Nightmares       LMP:  No LMP recorded. Patient has had a hysterectomy.     Social History     Socioeconomic History    Marital status:      Spouse name: Not on file    Number of children: Not on file    Years of education: Not on file    Highest education level: Not on file   Social Needs    Financial resource strain: Not on file    Food insecurity - worry: Not on file    Food insecurity - inability: Not on file    Transportation needs - medical: Not on file   [employer] needs - non-medical: Not on file   Occupational History    Not on file   Tobacco Use    Smoking status: Former Smoker     Packs/day: 1.00     Years: 25.00     Pack years: 25.00     Types: Cigarettes     Last attempt to quit: 2018     Years since quittin.0    Smokeless tobacco: Never Used    Tobacco comment: 8 cig a day    Substance and Sexual Activity    Alcohol use: No     Alcohol/week: 0.0 oz    Drug use: No    Sexual activity: Not on file   Other Topics Concern    Not on file   Social History Narrative    Not on file       Family History   Problem Relation Age of Onset   [facility] Cancer Father         lung,  age 58         Physical Exam   Visit Vitals  BP (!) 144/102 (BP 1 Location: Right arm)   Pulse (!) 110   Temp 97.7 °F (36.5 °C) (Oral)   Wt 98 lb 12.8 oz (44.8 kg)   SpO2 96%   BMI 15.95 kg/m²      BP Readings from Last 3 Encounters:   19 (!) 144/102   18 121/76   18 (!) 147/96     Constitutional: Thin, frail, anxious, but no acute distress, Vitals noted  Psychiatric:  Affect anxious, Alert and Oriented to person/place/time, thought content normal, insight good  Neuro: Stuttering speech, mild tremor intermittently  Eyes:  Conjunctiva clear, no drainage  ENT:  External ears and nose normal, Mucous membranes moist  Neck:  General inspection normal. Supple. Heart:  Normal HR, Normal S1 and S2,  Regular rhythm. No murmurs, rubs or gallops. Lungs:  Clear to auscultation, good respiratory effort, no wheezes, rales or rhonchi  Skin:  Warm to palpation, without rashes      Assessment/Plan:      ICD-10-CM ICD-9-CM    1. Tremor, unspecified R25.1 781.0 atenolol (TENORMIN) 25 mg tablet      REFERRAL TO NEUROLOGY   2. Stuttering F80.81 315.35 REFERRAL TO NEUROLOGY   3. Anxiety F41.9 300.00 mirtazapine (REMERON) 15 mg tablet      diazePAM (VALIUM) 5 mg tablet      DISCONTINUED: diazePAM (VALIUM) 5 mg tablet   4. Hypertension, unspecified type I10 401.9 atenolol (TENORMIN) 25 mg tablet   5. Underweight R63.6 783.22 mirtazapine (REMERON) 15 mg tablet   6. Tobacco dependence F17.200 305.1 varenicline (CHANTIX) 1 mg tablet       1) Tremors, stuttering - possible 2/2 severe anxiety or other pathology.  So far, brain imaging negative and neuro exam reassuring.  - Referral to neurology via [facility], pls help with making appt and give med assist handout to call and check in about care card ancelmo status (applied after MRI done)  - Start remeron 15mg nightly  - Continue valium 5mg tid prn  - Increase atenolol to 25mg qam and 12.5mg qpm (from 12.5mg bid previously) to help with BP, HR, tremors, migraines    Med explanation:  - Pt has coupon to cover meds for 1 month supply at [facility] on [address], SO several rx's sent to that pharmacy for 30 day supply + valium printed rx  - 3 mo supply of meds also sent to [facility] [address] and chantix [facility]      Follow-up Disposition:  Return in about 4 weeks (around 1/31/2019) for F/u appt.         Reynaldo Lopes MD  [address] [place] [address]

## 2019-01-04 ENCOUNTER — TELEPHONE (OUTPATIENT)
Dept: FAMILY MEDICINE CLINIC | Age: 59
End: 2019-01-04

## 2019-01-04 NOTE — TELEPHONE ENCOUNTER
Called and left generic message to return our call. On patient's LOV on 1/3/19 rxs were sent to [facility]. Nurse called [facility] and they tell me that patient's pharmacy eligibility had  and that a new application needs to be submitted, before she can get this new prescriptions. Nurse will sent [facility] application to patient's address on file and patient to bring back to one of our clinic locations completed application with financial documents for nurse to review and  faxed to [facility] to [phone].

## 2019-01-07 NOTE — TELEPHONE ENCOUNTER
Patient call   Trying to figure it out if the prescription has been sent to [facility] .     Att: Noelle Santos

## 2019-01-14 NOTE — TELEPHONE ENCOUNTER
Call made to pt regarding her prescriptions being sent to [facility]. Pt has since received her medications. Pt has also been approved for Medicaid. Pt has been informed that she will need to establish care with a PCP the takes her insurance. Pt mentioned that Dr Colleen Segovia is doing here disability papers and does not want to switch provider. Pt became emotional over the phone. Pt has been told that I will follow up with Dr Colleen Segovia and see what she just suggest fir the pt at this time. Dr Colleen Segovia please advise.   She may take it easier talking to you

## 2019-01-15 NOTE — TELEPHONE ENCOUNTER
I called the pt. We discussed her going to [facility] or [facility], and she likes the idea of going to a practice that has Dr. Jhony Zamora as a preceptor. I discussed that she would have a resident as her pcp, but that she could probably come in on days that Dr. Jhony Zamora is precepting, and that she would be helping the resident if Campos Sebastian is having difficult issues, and conferring on meds. I also discussed that she needs to prepare for having a new doctor every few years there, and she was ok with that. She also mentioned that she stopped the remeron due to a nightmare while on it.   Is also on chantix, so I recommended that she considertrying the remeron again once she is completely off chantix,

## 2019-01-15 NOTE — TELEPHONE ENCOUNTER
Patient, Bill Mckay, left a message for Dr. Rod Ocampo that she called  but they told her they did not have a Dr. Scar Ashton.     Kerline Mims April

## 2019-01-16 ENCOUNTER — TELEPHONE (OUTPATIENT)
Dept: FAMILY MEDICINE CLINIC | Age: 59
End: 2019-01-16

## 2019-01-16 NOTE — TELEPHONE ENCOUNTER
Patient, Whitley Mae, left another message at 5:15 PM on Tuesday, 1/15, the she called the number you gave her but they do not know Dr. Raj Pompa.     Latia Cervantes April

## 2019-01-16 NOTE — TELEPHONE ENCOUNTER
I called [facility] to schedule pt appt with resident MD on a day when I am precepting, first avail on 1/25/19 at 9am.     I then called pt to relay appt information and explain the confusion, she appreciated the call and said the appt date, time and location would work well for her. She expressed feeling much more comfortable knowing that a doctor she knows will be there and she is happy to see the resident and find a new PCP. She let me know as well that she stopped the remeron after taking in 4 days d/t having a nightmare - she is not sure if it's from remeron, chantix or related to hearing about one of her neighbors who was held at [email or web address] close to her place, but she plans to hold off on taking remeron until she is done with chantix. She says she has not smoked a cigarette since 2 weeks prior to Thanksgiving, so 2 months now! She is excited about this and I congratulated her on her accomplishment.

## 2019-01-25 ENCOUNTER — OFFICE VISIT (OUTPATIENT)
Dept: FAMILY MEDICINE CLINIC | Age: 59
End: 2019-01-25

## 2019-01-25 VITALS
TEMPERATURE: 97.8 F | HEART RATE: 91 BPM | HEIGHT: 65 IN | DIASTOLIC BLOOD PRESSURE: 87 MMHG | SYSTOLIC BLOOD PRESSURE: 129 MMHG | BODY MASS INDEX: 18.33 KG/M2 | OXYGEN SATURATION: 98 % | RESPIRATION RATE: 16 BRPM | WEIGHT: 110 LBS

## 2019-01-25 DIAGNOSIS — I45.6 WPW (WOLFF-PARKINSON-WHITE SYNDROME): ICD-10-CM

## 2019-01-25 DIAGNOSIS — H72.91 PERFORATION OF RIGHT TYMPANIC MEMBRANE: ICD-10-CM

## 2019-01-25 DIAGNOSIS — M81.0 OSTEOPOROSIS, UNSPECIFIED OSTEOPOROSIS TYPE, UNSPECIFIED PATHOLOGICAL FRACTURE PRESENCE: ICD-10-CM

## 2019-01-25 DIAGNOSIS — M89.9 DISORDER OF BONE AND CARTILAGE: ICD-10-CM

## 2019-01-25 DIAGNOSIS — M94.9 DISORDER OF BONE AND CARTILAGE: ICD-10-CM

## 2019-01-25 DIAGNOSIS — F41.9 ANXIETY: Primary | ICD-10-CM

## 2019-01-25 DIAGNOSIS — Z12.31 SCREENING MAMMOGRAM, ENCOUNTER FOR: ICD-10-CM

## 2019-01-25 DIAGNOSIS — F17.200 TOBACCO DEPENDENCE: ICD-10-CM

## 2019-01-25 RX ORDER — DIAZEPAM 5 MG/1
5 TABLET ORAL
Qty: 90 TAB | Refills: 0 | Status: SHIPPED | OUTPATIENT
Start: 2019-03-01 | End: 2019-02-22 | Stop reason: SDUPTHER

## 2019-01-25 RX ORDER — CYCLOBENZAPRINE HCL 10 MG
TABLET ORAL
Qty: 30 TAB | Refills: 2 | Status: SHIPPED | OUTPATIENT
Start: 2019-01-25 | End: 2019-04-26 | Stop reason: SDUPTHER

## 2019-01-25 NOTE — PATIENT INSTRUCTIONS

## 2019-01-25 NOTE — PROGRESS NOTES
Natalya Harris is a 62 y.o. female who had concerns including Anxiety (GAD7 - 16); Depression ( PHQ9 - 17, Stuttering started just 07/19/2018); and Establish Care (new patient with our practice ). Patient new to the practice today. She was previously seen at the [address] [place] [address]. Presents for new evaluation and treatment of of anxiety    ESTABLISHED ANXIETY DIAGNOSIS?: >10 years , that has acutely worsened after the death of a family member   PROMINENT SYMPTOMS: chest pain, shortness of breath, insomnia, difficulty concentrating. TRUE PANIC ATTACKS?: occur in addition to generalized anxiety   FREQUENCY OF ANXIETY:  several times a week   TRIGGERS: There are no apparent triggers that increase the anxiety. CURRENT TREATMENT: Chantix, Valium. She was previously on multiple SSRI and most recently Remeron, however she stopped due to nightmares. PERTINENT HISTORY: negative for bipolar disorder, depression, hyperthyroidism, obsessive- compulsive disorder, post traumatic stress disorder, drug use and alcohol use   FAMILY HISTORY:negative for psychiatric disorders. Patient denies suicidal or homicidal ideation. She feels like the Valium is not working as well as it should. She is having difficulty sleeping. Getting 4-5 hours of sleep per night. WPW  History of WPW. S/P heart cath x2. She reports CP a couple of times per week. She follows with cardiology this week. They did into have nay concerns at this time. They recommended follow-up in 1 year. She has nitro PRN and takes it 1-2 times per week. Right AOM  Right ear perforation. She is seeing ENT next week for furhther imaging and potential surgery    Osteoporosis  Not currently on any treatment at this time. She does take Vit. D. Last dexa was 2013 and showed osteoporosis. She states that she was previously on Fosamax, but had to stop due to bad dreams. No history of fracture.        ROS: (positive in bold)  General: wt loss, fever, chills, fatigue   ENT: See HPI  Skin: No rashes. Cardiac: chest pain, palpitations, PLUNKETT, edema   Pul: SOB, dyspnea, wheezing, cough, hemoptysis  GI: abdominal pain, N&V, diarrhea, constipation   MS: no muscle aches,   Neuro: headache, lightheaded, dizziness, stutter, tremor  Psych: see HPI    Past Medical History:  Past Medical History:   Diagnosis Date    Anxiety     History of mammography, screening     Normal    Ill-defined condition     WPW    Ill-defined condition     speech impairment    Pap smear for cervical cancer screening several years    Tremors of nervous system     Fazal-Parkinson-White (WPW) pattern        Past Surgical History:  Past Surgical History:   Procedure Laterality Date    HX HYSTERECTOMY         Family History:  Family History   Problem Relation Age of Onset    Cancer Father         lung,  age 58       Allergies: Allergies   Allergen Reactions    Bacitracin Swelling    Bactrim [Sulfamethoprim Ds] Rash    Ciprofloxacin Swelling    Fosamax [Alendronate] Nausea Only    Imitrex [Sumatriptan Succinate] Other (comments)     Chest pain    Keflex [Cephalexin] Nausea and Vomiting    Pcn [Penicillins] Swelling    Trazodone Other (comments)     Nightmares       Social History:  Social History     Tobacco Use    Smoking status: Former Smoker     Packs/day: 1.00     Years: 25.00     Pack years: 25.00     Types: Cigarettes     Last attempt to quit: 2018     Years since quittin.0    Smokeless tobacco: Never Used    Tobacco comment: 8 cig a day    Substance Use Topics    Alcohol use: No     Alcohol/week: 0.0 oz    Drug use: No       Current Meds:  Current Outpatient Medications on File Prior to Visit   Medication Sig Dispense Refill    OTHER       varenicline (CHANTIX) 1 mg tablet Take 1 tabs twice daily 60 Tab 1    mirtazapine (REMERON) 15 mg tablet Take 1 Tab by mouth nightly.  For anxiety 90 Tab 3    atenolol (TENORMIN) 25 mg tablet 1 tab in the morning and 1/2 tab at night 135 Tab 1  [START ON 2/3/2019] diazePAM (VALIUM) 5 mg tablet Take 1 Tab by mouth every eight (8) hours as needed for Anxiety. Max Daily Amount: 15 mg. Not to fill before 2/3/19 90 Tab 0    nitroglycerin (NITROSTAT) 0.4 mg SL tablet 1 Tab by SubLINGual route every five (5) minutes as needed for Chest Pain. For 2 doses. If cp is unrelieved after second dose call 911. 1 Bottle 1    ergocalciferol (ERGOCALCIFEROL) 50,000 unit capsule Take 1 Cap by mouth every seven (7) days. For 8 weeks and then once monthly. 12 Cap 3    varenicline (CHANTIX STARTER AYALA) 0.5 mg (11)- 1 mg (42) DsPk Take as directed on package. 1 Dose Pack 0    cyclobenzaprine (FLEXERIL) 10 mg tablet TAKE ONE TO ONE AND ONE-HALF TABLET BY MOUTH ONCE DAILY AS NEEDED FOR HEADACHE 30 Tab 2    aspirin delayed-release 81 mg tablet Take 1 Tab by mouth daily. 1 Tab 0     No current facility-administered medications on file prior to visit.          Visit Vitals  /87 (BP 1 Location: Left arm, BP Patient Position: Sitting)   Pulse 91   Temp 97.8 °F (36.6 °C) (Oral)   Resp 16   Ht 5' 5\" (1.651 m)   Wt 110 lb (49.9 kg)   SpO2 98%   BMI 18.30 kg/m²       Gen:  Well developed, well nourished female in no acute distress  HEENT: normocephalic/atraumatic; PERRL; right TM perforationNeck:   Supple, no lympadenopathy, no thyromegaly  Card:  RRR, no m/r/g  Chest:  CTAB, no w/r/r  Abd:  BS+, Soft, nontender/nondistended  Extr:  2+ pulses BL, no LE edema   Neuro: tremor  Psych:  Nl mood and affect     PHQ over the last two weeks 1/25/2019   PHQ Not Done -   Little interest or pleasure in doing things Nearly every day   Feeling down, depressed, irritable, or hopeless More than half the days   Total Score PHQ 2 5   Trouble falling or staying asleep, or sleeping too much Nearly every day   Feeling tired or having little energy Nearly every day   Poor appetite, weight loss, or overeating Not at all   Feeling bad about yourself - or that you are a failure or have let yourself or your family down Several days   Trouble concentrating on things such as school, work, reading, or watching TV Nearly every day   Moving or speaking so slowly that other people could have noticed; or the opposite being so fidgety that others notice More than half the days   Thoughts of being better off dead, or hurting yourself in some way Not at all   PHQ 9 Score 17   How difficult have these problems made it for you to do your work, take care of your home and get along with others Very difficult       Assessment/Plan:      ICD-10-CM ICD-9-CM    1. Anxiety F41.9 300.00    2. WPW (Fazal-Parkinson-White syndrome) I45.6 426.7    3. Osteoporosis, unspecified osteoporosis type, unspecified pathological fracture presence M81.0 733.00    4. Perforation of right tympanic membrane H72.91 384.20    5. Screening mammogram, encounter for Z12.31 V76.12 DEVON MAMMO BI SCREENING INCL CAD   6. Disorder of bone and cartilage M89.9 733.90 DEXA BONE DENSITY STUDY AXIAL    M94.9     7. Tobacco dependence F17.200 305.1       Anxity  Anxiety and depression. PHQ was 17 and MIRANDA 7 was 16. She is currently on Valium 5mg TID. She has been on numerous SSRI in the past with success, most recently Remeron.  was reviewed and appropriate without red flags. Had a lengthy discussion about being on benzo, discussed that we would not be able to increase her Vallium past TID. She is currently taking it BID, and rarely has to take it TID. So we discussed maybe doing an extra half of pill at night. Will consider restarting Remeron in the future once she is off of chantix. Dr. Dejan Salmeron wrote rx for refill of valium. 2. WPW  Currently followed by cardiology. Currently has Nitro to use PRN. Recently seen and evaluated with plan to follow-up in 1 year. Will defer treatment to cardiology    3. Osteoporosis  History of osteoporosis. She had a reaction of fosamax in the past.  Will repeat DEXA and consider starting another osteoporosis drug.  Advised her to continue taking Vit. D.     4. Right TM perforation. She is followed by ENT. Plan for follow-up imaging and plan for possible surgical procedure. 5. Screening Mammogram  Will schedule mammogram as it has been 5 years since last mammogram    6. Smoking Cessation  Currently on Chantix and has not had a cigarette since starting. She has a couple weeks lefts of her prescription. Once completed, she will try to go without Chantix. I have discussed the diagnosis with the patient and the intended plan as seen in the above orders. The patient has received an after-visit summary and questions were answered concerning future plans. I have discussed medication side effects and warnings with the patient as well. The patient agrees and understands above plan. Follow-up Disposition: Not on File    Patient discussed with supervising attending, Dr. Lisa Brown.      Cb Dubon,

## 2019-01-25 NOTE — PROGRESS NOTES
Identified pt with two pt identifiers(name and ).     Chief Complaint   Patient presents with    Anxiety     GAD7 16    Depression     17, Stuttering started just 2018        Health Maintenance Due   Topic    Hepatitis C Screening     Shingrix Vaccine Age 50> (1 of 2)    FOBT Q 1 YEAR AGE 50-75     BREAST CANCER SCRN MAMMOGRAM        Wt Readings from Last 3 Encounters:   19 110 lb (49.9 kg)   19 98 lb 12.8 oz (44.8 kg)   18 99 lb (44.9 kg)     Temp Readings from Last 3 Encounters:   19 97.8 °F (36.6 °C) (Oral)   19 97.7 °F (36.5 °C) (Oral)   18 98.1 °F (36.7 °C)     BP Readings from Last 3 Encounters:   19 129/87   19 (!) 144/102   18 121/76     Pulse Readings from Last 3 Encounters:   19 91   19 (!) 110   18 88         Learning Assessment:  :     Learning Assessment 7/15/2015 3/14/2014   PRIMARY LEARNER Patient Patient   BARRIERS PRIMARY LEARNER NONE -   PRIMARY LANGUAGE ENGLISH ENGLISH   LEARNER PREFERENCE PRIMARY LISTENING READING   ANSWERED BY patient patient   RELATIONSHIP SELF SELF       Depression Screening:  :     PHQ over the last two weeks 2019   PHQ Not Done -   Little interest or pleasure in doing things Nearly every day   Feeling down, depressed, irritable, or hopeless More than half the days   Total Score PHQ 2 5   Trouble falling or staying asleep, or sleeping too much Nearly every day   Feeling tired or having little energy Nearly every day   Poor appetite, weight loss, or overeating Not at all   Feeling bad about yourself - or that you are a failure or have let yourself or your family down Several days   Trouble concentrating on things such as school, work, reading, or watching TV Nearly every day   Moving or speaking so slowly that other people could have noticed; or the opposite being so fidgety that others notice More than half the days   Thoughts of being better off dead, or hurting yourself in some way Not at all   PHQ 9 Score 17   How difficult have these problems made it for you to do your work, take care of your home and get along with others Very difficult       Fall Risk Assessment:  :     No flowsheet data found. Abuse Screening:  :     No flowsheet data found. Coordination of Care Questionnaire:  :     1) Have you been to an emergency room, urgent care clinic since your last visit? yes 1/20/2019 [address] [facility] for shaking really bad  Hospitalized since your last visit? no             2) Have you seen or consulted any other health care providers outside of [address] [place] [address] since your last visit? no  (Include any pap smears or colon screenings in this section.)    3) Do you have an Advance Directive on file? no  Are you interested in receiving information about Advance Directives? no    Patient is accompanied by self I have received verbal consent from Alejandra Macias to discuss any/all medical information while they are present in the room. Reviewed record in preparation for visit and have obtained necessary documentation. Medication reconciliation up to date and corrected with patient at this time.

## 2019-01-27 NOTE — PROGRESS NOTES
I saw and evaluated pt with resident. I reviewed with the resident the patient's medical history and the resident's history and findings on the physical examination. I discussed assessment and plan with the resident and concur with the findings and plan as documented in the resident's note.    61 y.o. female well known to me from the [facility]  She is here to establish care, h/o severe anxiety, WPW, tobacco dependence  She reports feeling very comfortable with the environment, staff, medical student and resident physician - this is a big deal for this pt  Stable on valium.  Stopped smoking 2.5 months ago and reports no desire to start again  Her  even made appt to be seen by MD here (first time in 20+ yrs) for a wellness appt, desires to quit smoking also  I agree with resident's Rachel Titus MD

## 2019-01-29 ENCOUNTER — OFFICE VISIT (OUTPATIENT)
Dept: NEUROLOGY | Age: 59
End: 2019-01-29

## 2019-01-29 VITALS
BODY MASS INDEX: 18.33 KG/M2 | HEART RATE: 100 BPM | SYSTOLIC BLOOD PRESSURE: 110 MMHG | WEIGHT: 110 LBS | HEIGHT: 65 IN | RESPIRATION RATE: 20 BRPM | DIASTOLIC BLOOD PRESSURE: 76 MMHG | OXYGEN SATURATION: 96 %

## 2019-01-29 DIAGNOSIS — R00.0 TACHYCARDIA: ICD-10-CM

## 2019-01-29 DIAGNOSIS — Z82.0 FAMILY HISTORY OF BENIGN ESSENTIAL TREMOR: ICD-10-CM

## 2019-01-29 DIAGNOSIS — G44.221 CHRONIC TENSION-TYPE HEADACHE, INTRACTABLE: ICD-10-CM

## 2019-01-29 DIAGNOSIS — F98.5 ADULT ONSET STUTTERING: ICD-10-CM

## 2019-01-29 DIAGNOSIS — G25.0 ESSENTIAL TREMOR: Primary | ICD-10-CM

## 2019-01-29 RX ORDER — PROPRANOLOL HYDROCHLORIDE 80 MG/1
80 CAPSULE, EXTENDED RELEASE ORAL DAILY
Qty: 30 CAP | Refills: 1 | Status: SHIPPED | OUTPATIENT
Start: 2019-01-29 | End: 2019-02-12

## 2019-01-29 NOTE — PROGRESS NOTES
Patient is here for tremors and stuttering. Patient states that she is shaking all over- really bad on 12/26/19, ER visit. Started stuttering July 19th.

## 2019-01-29 NOTE — PROGRESS NOTES
Name:  Guido Giron      :  1960    PCP:   Georgina Garcia MD      Referring:  Thom Ayoub MD/ Family Practice  MRN:   [id number]    Chief Complaint:   Chief Complaint   Patient presents with    Tremors     tremors and stuttering       HISTORY OF PRESENT ILLNESS:     This is a 61 y.o. female who presents for evaluation of tremors and stuttering. Patient reports that she's had tremors for about 10-12 years, affecting her hands and legs, right hand worse than left. At times the tremors get so bad she shakes all over. She can't identify any clear stressors, though says she has severe anxiety and agoraphobia. She also reports that multiple family members on father's side had similar tremors. She reports getting 4-5 hours of sleep a night, not drinking caffeine, and no known thyroid issues. Regarding stuttering, she denies ever stuttering until the day after her brother passed away in 2018. Reviewed MRI Brain report and images with patient. Normal appearing MRI. Radiology noted a mild to moderate right mastoid effusion. Reviewed recent labs from 18: Vit D 13.2 (very low), BMP normal,  CBC normal.  TSH not checked in a long time ().       Complete Review of Systems: + anxiety, chest pain, depression, some falls, fatigue, headaches, hearing loss, joint pain, leg swelling, memory loss, muscle pain, muscle weakness, decreased appetite, tinnitus, dyspnea, weight changes; otherwise as noted in HPI     Allergies   Allergen Reactions    Bacitracin Swelling    Bactrim [Sulfamethoprim Ds] Rash    Ciprofloxacin Swelling    Fosamax [Alendronate] Nausea Only    Imitrex [Sumatriptan Succinate] Other (comments)     Chest pain    Keflex [Cephalexin] Nausea and Vomiting    Pcn [Penicillins] Swelling    Trazodone Other (comments)     Nightmares     Past Medical History:   Diagnosis Date    Anxiety     History of mammography, screening     Normal    Ill-defined condition     WPW    Ill-defined condition     speech impairment    Pap smear for cervical cancer screening several years    Tremors of nervous system     Fazal-Parkinson-White (WPW) pattern      Current Outpatient Medications   Medication Sig Dispense Refill    propranolol LA (INDERAL LA) 80 mg SR capsule Take 1 Cap by mouth daily. To reduce tachycardia, tremors, and headache frequency 30 Cap 1    OTHER       cyclobenzaprine (FLEXERIL) 10 mg tablet TAKE ONE TABLET BY MOUTH ONCE DAILY AS NEEDED FOR MUSCLE TENSION HEADACHE 30 Tab 2    [START ON 3/1/2019] diazePAM (VALIUM) 5 mg tablet Take 1 Tab by mouth every eight (8) hours as needed for Anxiety. Max Daily Amount: 15 mg. Not to fill before 3/1/19 90 Tab 0    varenicline (CHANTIX) 1 mg tablet Take 1 tabs twice daily 60 Tab 1    nitroglycerin (NITROSTAT) 0.4 mg SL tablet 1 Tab by SubLINGual route every five (5) minutes as needed for Chest Pain. For 2 doses. If cp is unrelieved after second dose call 911. 1 Bottle 1    ergocalciferol (ERGOCALCIFEROL) 50,000 unit capsule Take 1 Cap by mouth every seven (7) days. For 8 weeks and then once monthly. 12 Cap 3    aspirin delayed-release 81 mg tablet Take 1 Tab by mouth daily. 1 Tab 0    mirtazapine (REMERON) 15 mg tablet Take 1 Tab by mouth nightly.  For anxiety 90 Tab 3     Past Surgical History:   Procedure Laterality Date    HX HYSTERECTOMY       Family History   Problem Relation Age of Onset    Cancer Father         lung,  age 58     Social History     Socioeconomic History    Marital status:      Spouse name: Not on file    Number of children: Not on file    Years of education: Not on file    Highest education level: Not on file   Social Needs    Financial resource strain: Not on file    Food insecurity - worry: Not on file    Food insecurity - inability: Not on file   Benchling needs - medical: Not on file   Benchling needs - non-medical: Not on file   Occupational History    Not on file Tobacco Use    Smoking status: Former Smoker     Packs/day: 1.00     Years: 25.00     Pack years: 25.00     Types: Cigarettes     Last attempt to quit: 2018     Years since quittin.1    Smokeless tobacco: Never Used    Tobacco comment: 8 cig a day    Substance and Sexual Activity    Alcohol use: No     Alcohol/week: 0.0 oz    Drug use: No    Sexual activity: Not on file   Other Topics Concern    Not on file   Social History Narrative    Not on file       PHYSICAL EXAM  Vitals:    19 1106   BP: 110/76   Pulse: 100   Resp: 20   SpO2: 96%   Weight: 49.9 kg (110 lb)   Height: 5' 5\" (1.651 m)       General:  Alert, cooperative, NAD, stuttering as speak. Head:  Normocephalic, atraumatic. Eyes:  Conjunctivae/corneas clear   Lungs:  Heart:  Non labored breathing  Mild tachycardia   Extremities: No edema. Skin: No rashes    Neurologic Exam       Language: normal    Memory:  Alert, oriented to person, place, situation. Doctors office, 2nd floor, [place], [place], , [place]. Incorrect:     Cranial Nerves:  I: smell Not tested   II: visual fields Full to confrontation   II: pupils Equal, round, reactive to light   II: optic disc No papilledema   III,VII: ptosis none   III,IV,VI: extraocular muscles  normal   V: facial light touch sensation  normal   VII: facial muscle function  symmetric   VIII: hearing symmetric   IX: soft palate elevation  normal   XI: sternocleidomastoid strength 5/5   XII: tongue  midline      Motor: normal bulk, tone, strength in all exts  Sensory: intact to LT, temp, vibration x 4 exts   Cerebellar: no resting tremors, no cogwheel rigidity. Mild postural tremor and minimal bilateral intention tremor. Normal FNF and H-Shin bilaterally  Reflexes: 1+ throughout  Plantar response: neutral bilaterally    Gait: normal gait including tandem  Romberg negative     Reviewed last clinic note by Dr Annetta Schmidt, 1-3-9    ASSESSMENT AND PLAN    ICD-10-CM ICD-9-CM    1. Essential tremor G25.0 333.1 TSH 3RD GENERATION      propranolol LA (INDERAL LA) 80 mg SR capsule      EEG AMB NEURO   2. Family history of benign essential tremor Z82.0 V17.2 TSH 3RD GENERATION      EEG AMB NEURO   3. Adult onset stuttering F98.5 307.0    4. Chronic tension-type headache, intractable G44.221 339.12 propranolol LA (INDERAL LA) 80 mg SR capsule   5. Tachycardia R00.0 785.0 propranolol LA (INDERAL LA) 80 mg SR capsule       1) Stuttering  Normal MRI Brain. Discussed with patient that the stuttering is due to ongoing psychological stressors, related to her brother's passing. Advised her to work with counselor to deal with underlying stressors, and that it would get better with time. 2) Tremors  Likely essential tremor exacerbated at times by anxiety/ stressors. Also a FHx of similar tremors. Will check TSH to be sure not hyperthyroid. Check EEG. D/w patient that Propranolol may help better than Atenolol. Advised her to d/c atenolol and start propranolol LA 80 mg /day. 3) Chronic headaches (mix of tension HA and migraine)  Start Propranolol LA 80 mg/ day    4) Tachycardia  Stopping Atenolol, starting Propranolol    5) Follow up in 6 weeks. Thank you for allowing me to be a part of your patient's care. Please call me at [phone] if you have any questions.      Sincerely,  Elie Baird MD    Signed By: Elie Baird MD     January 29, 2019

## 2019-02-05 ENCOUNTER — HOSPITAL ENCOUNTER (OUTPATIENT)
Dept: MAMMOGRAPHY | Age: 59
Discharge: HOME | End: 2019-02-05
Attending: STUDENT IN AN ORGANIZED HEALTH CARE EDUCATION/TRAINING PROGRAM
Payer: MEDICAID

## 2019-02-05 DIAGNOSIS — Z12.31 SCREENING MAMMOGRAM, ENCOUNTER FOR: ICD-10-CM

## 2019-02-05 DIAGNOSIS — M89.9 DISORDER OF BONE AND CARTILAGE: ICD-10-CM

## 2019-02-05 DIAGNOSIS — M94.9 DISORDER OF BONE AND CARTILAGE: ICD-10-CM

## 2019-02-05 PROCEDURE — 77067 SCR MAMMO BI INCL CAD: CPT

## 2019-02-05 PROCEDURE — 77080 DXA BONE DENSITY AXIAL: CPT

## 2019-02-07 ENCOUNTER — TELEPHONE (OUTPATIENT)
Dept: FAMILY MEDICINE CLINIC | Age: 59
End: 2019-02-07

## 2019-02-07 NOTE — TELEPHONE ENCOUNTER
Patient, Harjinder Mary, called again about her reminder call. I called her and told her that the reminder call should never have gone out and apologized. She thanked me.   Marek Vee April

## 2019-02-07 NOTE — TELEPHONE ENCOUNTER
SJO patient, Harjinder Mary, said she received a call reminding her about her appointment at [facility]; however, she said Dr. Isaac Linares had told her she couldn't come to the [facility] any longer. She asked that we please call her.     Marek Shetty

## 2019-02-08 LAB — TSH SERPL DL<=0.005 MIU/L-ACNC: 1.87 UIU/ML (ref 0.45–4.5)

## 2019-02-12 ENCOUNTER — TELEPHONE (OUTPATIENT)
Dept: NEUROLOGY | Age: 59
End: 2019-02-12

## 2019-02-12 ENCOUNTER — TELEPHONE (OUTPATIENT)
Dept: FAMILY MEDICINE CLINIC | Age: 59
End: 2019-02-12

## 2019-02-12 DIAGNOSIS — G25.0 ESSENTIAL TREMOR: Primary | ICD-10-CM

## 2019-02-12 DIAGNOSIS — G25.0 FAMILIAL TREMOR: ICD-10-CM

## 2019-02-12 RX ORDER — PRIMIDONE 50 MG/1
TABLET ORAL
Qty: 30 TAB | Refills: 1 | Status: SHIPPED | OUTPATIENT
Start: 2019-02-12 | End: 2019-03-19

## 2019-02-12 NOTE — TELEPHONE ENCOUNTER
Left message for patient to return call. Recent DEXA showed Osteoporosis. She has an appointment with Dr. Obdulio Singleton on 2/22. Discussed with Dr. Obdulio Singleton. Plan to discuss treatment options at that time.      Pratibha Sosa, DO

## 2019-02-12 NOTE — TELEPHONE ENCOUNTER
----- Message from Dioni Cole sent at 2/12/2019 12:41 PM EST -----  Regarding: Dr. Jyoti Kaiser / Telephone  Contact: [phone]  Pt requested a return call regarding side effects from new medication.   Pt stated BP reading was 151/91

## 2019-02-12 NOTE — TELEPHONE ENCOUNTER
Spoke with patient. She says propranolol hasn't helped tremors and caused her to have headaches at night and BP to shoot up. Didn't take last night and says no headache now. She tried/ failed topamax in the past.  We discussed trying Primidone at/ near bedtime as it can help reduce essential tremor. She wants to try. Told her it causes drowsiness and NOT to take her Valium at night time, but okay to take her Valium 5 mg tablet during the daytime if she was awake, alert, but feeling anxious. She expressed understanding.

## 2019-02-12 NOTE — TELEPHONE ENCOUNTER
Patient seeking if she can have a call regarding the results of her Bone Scan that she had done at [address] [place] [address]

## 2019-02-22 ENCOUNTER — OFFICE VISIT (OUTPATIENT)
Dept: FAMILY MEDICINE CLINIC | Age: 59
End: 2019-02-22

## 2019-02-22 VITALS
HEART RATE: 93 BPM | WEIGHT: 112 LBS | OXYGEN SATURATION: 99 % | SYSTOLIC BLOOD PRESSURE: 137 MMHG | RESPIRATION RATE: 18 BRPM | HEIGHT: 65 IN | DIASTOLIC BLOOD PRESSURE: 89 MMHG | TEMPERATURE: 97.8 F | BODY MASS INDEX: 18.66 KG/M2

## 2019-02-22 DIAGNOSIS — M54.5 LOW BACK PAIN, UNSPECIFIED BACK PAIN LATERALITY, UNSPECIFIED CHRONICITY, WITH SCIATICA PRESENCE UNSPECIFIED: ICD-10-CM

## 2019-02-22 DIAGNOSIS — M81.0 OSTEOPOROSIS WITHOUT CURRENT PATHOLOGICAL FRACTURE, UNSPECIFIED OSTEOPOROSIS TYPE: ICD-10-CM

## 2019-02-22 DIAGNOSIS — F32.A DEPRESSION, UNSPECIFIED DEPRESSION TYPE: Primary | ICD-10-CM

## 2019-02-22 DIAGNOSIS — F41.9 ANXIETY: ICD-10-CM

## 2019-02-22 RX ORDER — PROPRANOLOL HYDROCHLORIDE 80 MG/1
CAPSULE, EXTENDED RELEASE ORAL DAILY
COMMUNITY
End: 2019-03-04

## 2019-02-22 RX ORDER — RIZATRIPTAN BENZOATE 5 MG/1
5 TABLET ORAL
Qty: 9 TAB | Refills: 2 | Status: SHIPPED | OUTPATIENT
Start: 2019-02-22 | End: 2019-02-22

## 2019-02-22 RX ORDER — BUTALBITAL, ACETAMINOPHEN AND CAFFEINE 50; 325; 40 MG/1; MG/1; MG/1
1 TABLET ORAL
Qty: 30 TAB | Refills: 0 | Status: SHIPPED | OUTPATIENT
Start: 2019-02-22 | End: 2019-03-04

## 2019-02-22 RX ORDER — DIAZEPAM 5 MG/1
5 TABLET ORAL
Qty: 60 TAB | Refills: 0 | Status: SHIPPED | OUTPATIENT
Start: 2019-04-01 | End: 2019-04-26 | Stop reason: SDUPTHER

## 2019-02-22 RX ORDER — MIRTAZAPINE 15 MG/1
15 TABLET, FILM COATED ORAL
Qty: 30 TAB | Refills: 0
Start: 2019-02-22 | End: 2019-03-22 | Stop reason: SDDI

## 2019-02-22 NOTE — PROGRESS NOTES
CC: anxiety and depression, osteoporosis     History of Present Illness:  Sebas Elmore is a 61 y.o. female. She has anxiety and depression treated with valium 5mg every 8 hours as needed; she takes the valium bid and says some days her anxiety does well and other days it doesn't. Sometimes is busy and doesn't take valium but once in a day. She says her depression is \"bad;\" has insomnia, decreased energy, decreased appetite. She stopped the remeron because she had a nightmare. She does not want to be on the valium long-term. She has tried many SSRIs in the past and had adverse reactions. She has tried Effexor as well. She denies suicidal and homicidal ideations. She has no plans to hurt herself or anyone else. She is on her last week of chantix and has not smoked in 4 months; she sometimes forgets to take the chantix.      Recent DEXA scan revealed osteoporosis. She has an allergy to alendronate. She takes 50,000 units of vitamin D once a week and has one week left on that before she starts taking it once a month. She says she does take a calcium supplement as well but is unsure of the dose. At the end of the visit, she also notes low back pain that has been present for \"a long time. \"       Allergies   Allergen Reactions    Bacitracin Swelling    Bactrim [Sulfamethoprim Ds] Rash    Ciprofloxacin Swelling    Fosamax [Alendronate] Nausea Only    Imitrex [Sumatriptan Succinate] Other (comments)     Chest pain    Keflex [Cephalexin] Nausea and Vomiting    Pcn [Penicillins] Swelling    Trazodone Other (comments)     Nightmares     Current Outpatient Medications   Medication Sig Dispense Refill    propranolol LA (INDERAL LA) 80 mg SR capsule Take  by mouth daily.  mirtazapine (REMERON) 15 mg tablet Take 1 Tab by mouth nightly. For anxiety 90 Tab 3    primidone (MYSOLINE) 50 mg tablet Take HALF in evening for 1 week, then increase to 1 full tablet at bedtime. For essential tremor.   Caution: medication causing drowsiness. 30 Tab 1    OTHER       cyclobenzaprine (FLEXERIL) 10 mg tablet TAKE ONE TABLET BY MOUTH ONCE DAILY AS NEEDED FOR MUSCLE TENSION HEADACHE 30 Tab 2    [START ON 3/1/2019] diazePAM (VALIUM) 5 mg tablet Take 1 Tab by mouth every eight (8) hours as needed for Anxiety. Max Daily Amount: 15 mg. Not to fill before 3/1/19 90 Tab 0    varenicline (CHANTIX) 1 mg tablet Take 1 tabs twice daily 60 Tab 1    nitroglycerin (NITROSTAT) 0.4 mg SL tablet 1 Tab by SubLINGual route every five (5) minutes as needed for Chest Pain. For 2 doses. If cp is unrelieved after second dose call 911. 1 Bottle 1    ergocalciferol (ERGOCALCIFEROL) 50,000 unit capsule Take 1 Cap by mouth every seven (7) days. For 8 weeks and then once monthly. 12 Cap 3    aspirin delayed-release 81 mg tablet Take 1 Tab by mouth daily.  1 Tab 0     Patient Active Problem List   Diagnosis Code    Tympanic membrane rupture H72.90    Long Q-T syndrome I45.81    WPW (Fazal-Parkinson-White syndrome) I45.6    Panic disorder with agoraphobia F40.01    Generalized anxiety disorder F41.1    Migraine G43.909    Hyperlipidemia E78.5    Osteoporosis M81.0    Chest pain R07.9    Palpitations R00.2    Acquired hypothyroidism E03.9       Past Medical History:   Diagnosis Date    Anxiety     History of mammography, screening 2012    Normal    Ill-defined condition     WPW    Ill-defined condition     speech impairment    Pap smear for cervical cancer screening several years    Tremors of nervous system     Fazal-Parkinson-White (WPW) pattern      Past Surgical History:   Procedure Laterality Date    HX HYSTERECTOMY       Social History     Socioeconomic History    Marital status:      Spouse name: Not on file    Number of children: Not on file    Years of education: Not on file    Highest education level: Not on file   Tobacco Use    Smoking status: Former Smoker     Packs/day: 1.00     Years: 25.00     Pack years: 25.00 Types: Cigarettes     Last attempt to quit: 2018     Years since quittin.1    Smokeless tobacco: Never Used    Tobacco comment: 8 cig a day    Substance and Sexual Activity    Alcohol use: No     Alcohol/week: 0.0 oz    Drug use: No    Sexual activity: Yes     Family History   Problem Relation Age of Onset    Cancer Father         lung,  age 58    Breast Cancer Paternal Aunt          Review of Systems   Respiratory: Negative for shortness of breath. Cardiovascular: Negative for chest pain. Psychiatric/Behavioral: Positive for depression. Negative for substance abuse and suicidal ideas. The patient is nervous/anxious and has insomnia. Physical Exam:  Visit Vitals  /89 (BP 1 Location: Left arm, BP Patient Position: Sitting)   Pulse 93   Temp 97.8 °F (36.6 °C) (Oral)   Resp 18   Ht 5' 5\" (1.651 m)   Wt 112 lb (50.8 kg)   SpO2 99%   BMI 18.64 kg/m²     Physical Exam   Constitutional: She is oriented to person, place, and time. No distress. Cardiovascular: Normal rate, regular rhythm and normal heart sounds. Pulmonary/Chest: Effort normal and breath sounds normal.   Neurological: She is alert and oriented to person, place, and time. PHQ-9 15  MIRANDA-7 18    Assessment/Plan:  1. Depression, unspecified depression type  Gave her list of behavorial health places in the area to begin some counseling/CBT. She will call around to see where she can get an appointment. Plan to restart remeron at the completion of her chantix within the next week as chantix may have caused the nightmare. 2. Anxiety  REstart remeron after completion of chantix next week. Dr. Mannie Troy gave refill for valium as listed below.  reviewed today. Weaning the valium with goal to stop it over the next few months. Changing from tid to bid prn.   - mirtazapine (REMERON) 15 mg tablet; Take 1 Tab by mouth nightly. For one week. Then increase to two tablets nightly. For anxiety. Dispense: 30 Tab;  Refill: 0  - diazePAM (VALIUM) 5 mg tablet; Take 1 Tab by mouth every twelve (12) hours as needed for Anxiety. Max Daily Amount: 10 mg. Not to fill before 4/1/19  Dispense: 60 Tab; Refill: 0    3. Osteoporosis without current pathological fracture, unspecified osteoporosis type  With allergy to alendronate, will refer to endocrine.   - REFERRAL TO ENDOCRINOLOGY    4. Low back pain, unspecified back pain laterality, unspecified chronicity, with sciatica presence unspecified  MEntioned at the end of the visit; will order xray and evaluate further with more discussion and back exam at next visit. Osteoporosis related? ?  - XR SPINE LUMB MIN 4 V; Future        Follow-up Disposition:  Return in about 4 weeks (around 3/22/2019). Diagnoses, tests, plan, and follow up discussed with patient. I have given to and reviewed with the patient the after visit summary. I have reviewed medication side effects and precautions. Patient expressed agreement and understanding. All questions were answered. Discussed with Dr. Ramonita Davis.

## 2019-02-22 NOTE — LETTER
2/22/2019 9:30 AM 
 
Ms. Fela Corado [address] [id number] To Whom It May Concern: 
 
Feal Corado is currently under the care of [address] [facility]. I have been caring for Ms. Santo for the last 6 months. She suffers from several chronic conditions that support her inability to work for the extended future. Her medical and psychologic conditions include essential tremor, speech impediment (significant stuttering), severe anxiety with agoraphobia, migraine headaches and osteoporosis. In my medical opinion, she will likely be unable to work in a meaningful capacity for the next 2 years. If there are questions or concerns please have the patient contact our office. Sincerely, Adriana Marie MD

## 2019-02-22 NOTE — PROGRESS NOTES
Ellis Fernandez is a 61 y.o. female      Chief Complaint   Patient presents with    Follow-up     Anxiety/ depression / BMD results         1. Have you been to the ER, urgent care clinic since your last visit? Hospitalized since your last visit? No      2. Have you seen or consulted any other health care providers outside of the [address] [place] [address] since your last visit? Include any pap smears or colon screening.     NO

## 2019-02-22 NOTE — PROGRESS NOTES
CC: anxiety and depression, osteoporosis 
  
History of Present Illness: 
Luke Cheema is a 61 y.o. female. She has anxiety and depression treated with valium 5mg every 8 hours as needed; she takes the valium bid and says some days her anxiety does well and other days it doesn't. She says her depression is \"bad;\" has insomnia, decreased energy, decreased appetite.  
  
Recent DEXA scan revealed osteoporosis. She has an allergy to alendronate. She takes 50,000 units of vitamin D once a week and has one week left on that before she starts taking it once a month.

## 2019-02-22 NOTE — PATIENT INSTRUCTIONS
Mirtazapine (By mouth)   Mirtazapine ([id number])  Treats depression. Brand Name(s): Remeron, Remeron Soltab   There may be other brand names for this medicine. When This Medicine Should Not Be Used: This medicine is not right for everyone. Do not use it if you had an allergic reaction to mirtazapine. How to Use This Medicine:   Tablet, Dissolving Tablet  · Take your medicine as directed. Your dose may need to be changed several times to find what works best for you. Your doctor may tell you to take this medicine at bedtime, because it can make you sleepy. · You may need to take this medicine for several weeks before you begin to feel better. · Make sure your hands are dry before you handle the disintegrating tablet. Peel back the foil from the blister pack, then remove the tablet. Do not push the tablet through the foil. Place the tablet in your mouth. After it has melted, swallow or take a drink of water. Do not crush, split, or break the tablet. · This medicine should come with a Medication Guide. Ask your pharmacist for a copy if you do not have one. · Missed dose: Take a dose as soon as you remember. If it is almost time for your next dose, wait until then and take a regular dose. Do not take extra medicine to make up for a missed dose. · Store the medicine in a closed container at room temperature, away from heat, moisture, and direct light. Keep the orally disintegrating tablet in the original package until you are ready to take it. Drugs and Foods to Avoid:   Ask your doctor or pharmacist before using any other medicine, including over-the-counter medicines, vitamins, and herbal products. · Do not use this medicine and an MAO inhibitor within 14 days of each other. · Some medicines can affect how mirtazapine works.  Tell your doctor if you are using any of the following:  ¨ Buspirone, carbamazepine, cimetidine, diazepam, fentanyl, lithium, nefazodone, phenytoin, rifampicin, Farzaneh's wort, tramadol, or tryptophan  ¨ Other medicine to treat depression, a triptan medicine to treat migraine headaches, medicine to treat an infection, medicine to treat HIV, or a blood thinner (such as warfarin)  · Do not drink alcohol while you are using this medicine. Warnings While Using This Medicine:   · Tell your doctor if you are pregnant or breastfeeding, or if you have kidney disease, liver disease, glaucoma, high cholesterol, heart or blood vessel disease, or a history of seizures, heart attack, or stroke. Tell your doctor if you have phenylketonuria. · For some children, teenagers, and young adults, this medicine may increase mental or emotional problems. This may lead to thoughts of suicide and violence. Talk with your doctor right away if you have any thoughts or behavior changes that concern you. Tell your doctor if you or anyone in your family has a history of bipolar disorder or suicide attempts. · This medicine may cause the following problems:  ¨ Serotonin syndrome (may be life-threatening)  ¨ Decreased white blood cells, which can affect your body's ability to fight an infection  ¨ Low sodium levels in the blood  · Do not stop using this medicine suddenly. Your doctor will need to slowly decrease your dose before you stop it completely. · This medicine may make you dizzy or drowsy. Do not drive or do anything else that could be dangerous until you know how this medicine affects you. Stand or sit up slowly if you feel lightheaded or dizzy. · Your doctor will do lab tests at regular visits to check on the effects of this medicine. Keep all appointments. · Keep all medicine out of the reach of children. Never share your medicine with anyone.   Possible Side Effects While Using This Medicine:   Call your doctor right away if you notice any of these side effects:  · Allergic reaction: Itching or hives, swelling in your face or hands, swelling or tingling in your mouth or throat, chest tightness, trouble breathing  · Anxiety, restlessness, fast heartbeat, fever, sweating, muscle spasms, nausea, vomiting, diarrhea, seeing or hearing things that are not there  · Blistering, peeling, red skin rash  · Eye pain, vision changes, seeing halos around lights  · Confusion, weakness, muscle twitching  · Feeling more excited or energetic than usual  · Fever, chills, cough, sore throat, body aches  · Thoughts of hurting yourself or others, worsening depression, unusual behaviors  If you notice these less serious side effects, talk with your doctor:   · Dry mouth, constipation  · Increased appetite or weight gain  · Sleepiness, tiredness  If you notice other side effects that you think are caused by this medicine, tell your doctor. Call your doctor for medical advice about side effects. You may report side effects to FDA at 7-907-FDA-0404  © 2017 [facility] Information is for End User's use only and may not be sold, redistributed or otherwise used for commercial purposes. The above information is an  only. It is not intended as medical advice for individual conditions or treatments. Talk to your doctor, nurse or pharmacist before following any medical regimen to see if it is safe and effective for you. Learning About Positive Thinking  What is positive thinking? Positive thinking, or healthy thinking, is a way to help you stay well or cope with a health problem by changing how you think. It's based on research that shows that you can change how you think. And how you think affects how you feel. Cognitive-behavioral therapy, also called CBT, is a therapy that is often used to help people think in a healthy way. It focuses on thought (cognitive) and action (behavioral). How can positive thinking help you? If you think in a positive way, you may be more able to care for yourself and handle life's challenges. You will feel better.  And you may be more able to avoid or cope with stress, anxiety, and depression. CBT may be able to help you sleep better and lose weight. How can you get started with positive thinking? CBT involves techniques that you can practice every day so that healthy thinking comes naturally. Here are the steps for one technique. 1. Stop. When you notice a negative thought, stop it in its tracks and write it down. 2. Ask. Look at that thought and ask yourself whether it is helpful or unhelpful right now. 3. Choose. Choose a new, helpful thought to replace a negative one. Here's an example of how this might work:  · In a job review, your boss praised several things about your work. But you're feeling down because she had one small criticism. You might even think, \"I'm no good at my job\" or \"She doesn't like me. I must be bad. \" These are negative thoughts. You want to stop them. · Ask yourself questions about the situation and your negative thoughts. You might ask, \"What did my boss say exactly? \" \"Were there positive comments? \" \"Why do I focus only on one criticism? \" Your answers can help you find more accurate and helpful statements. · Now choose a helpful thought to replace the negative thoughts. For example, you might think, \"I've done a lot of good work this year, and my boss noticed it. She thought there was one area I can improve. So I'll think of some things I can do to get stronger in that area. \"  With time and practice, you can learn to see that the harsh things you say to yourself may keep you from enjoying your life and work. You can replace them with more helpful thoughts. Where can you learn more? Go to http://vinita-jade.info/. Enter B951 in the search box to learn more about \"Learning About Positive Thinking. \"  Current as of: September 11, 2018  Content Version: 11.9  © 0597-8665 [employer]. Care instructions adapted under license by [employer] (which disclaims liability or warranty for this information).  If you have questions about a medical condition or this instruction, always ask your healthcare professional. [place] [address] any warranty or liability for your use of this information.

## 2019-02-22 NOTE — PROGRESS NOTES
Pt called to say her mild headache that was present in the office at visit today has worsening to more severe now. She is resting at home and has tried flexeril 10mg x 1 and advil migraine x 2 tabs without relief. + associated nausea and light sensitivity. Says it feels like her typical migraines, just more severe. Not the worst HA of her life and had a gradual onset/worsening. Denies h/o aura prior to migraines nor with this episode. She denies new/worsening speech changes, facial droop, focal weakness, vision changes. She is at home with her  Shaheen Mendez. She prefers not to go to the ER bc she just wants to rest in a dark room and be still. She cannot recall what she's taken for migraines before, but relays she has allergy to tramadol and trazodone. I asked about her listed allergy to imitrex and she says it caused \"suicidal ideation. \" I told her we could try an alternate med in this class, but to stop it if noticing SEs. Also will send rx for fioricet prn which she had last in 2013, pt doesn't recall taking this if effective or not. I advised her to call 911 if HA worsening or any neurologic sxs discussed above.

## 2019-03-04 ENCOUNTER — HOSPITAL ENCOUNTER (OUTPATIENT)
Dept: PREADMISSION TESTING | Age: 59
Discharge: HOME | End: 2019-03-04
Payer: MEDICAID

## 2019-03-04 VITALS
HEIGHT: 66 IN | BODY MASS INDEX: 17.74 KG/M2 | TEMPERATURE: 98.1 F | RESPIRATION RATE: 16 BRPM | DIASTOLIC BLOOD PRESSURE: 91 MMHG | WEIGHT: 110.38 LBS | HEART RATE: 91 BPM | SYSTOLIC BLOOD PRESSURE: 136 MMHG

## 2019-03-04 LAB
ANION GAP SERPL CALC-SCNC: 5 MMOL/L (ref 5–15)
ATRIAL RATE: 85 BPM
BASOPHILS # BLD: 0.1 K/UL (ref 0–0.1)
BASOPHILS NFR BLD: 1 % (ref 0–1)
BUN SERPL-MCNC: 12 MG/DL (ref 6–20)
BUN/CREAT SERPL: 20 (ref 12–20)
CALCIUM SERPL-MCNC: 9.4 MG/DL (ref 8.5–10.1)
CALCULATED P AXIS, ECG09: 65 DEGREES
CALCULATED R AXIS, ECG10: 83 DEGREES
CALCULATED T AXIS, ECG11: 9 DEGREES
CHLORIDE SERPL-SCNC: 102 MMOL/L (ref 97–108)
CO2 SERPL-SCNC: 32 MMOL/L (ref 21–32)
CREAT SERPL-MCNC: 0.59 MG/DL (ref 0.55–1.02)
DIAGNOSIS, 93000: NORMAL
DIFFERENTIAL METHOD BLD: NORMAL
EOSINOPHIL # BLD: 0.1 K/UL (ref 0–0.4)
EOSINOPHIL NFR BLD: 2 % (ref 0–7)
ERYTHROCYTE [DISTWIDTH] IN BLOOD BY AUTOMATED COUNT: 12.5 % (ref 11.5–14.5)
GLUCOSE SERPL-MCNC: 96 MG/DL (ref 65–100)
HCT VFR BLD AUTO: 41.7 % (ref 35–47)
HGB BLD-MCNC: 13.9 G/DL (ref 11.5–16)
IMM GRANULOCYTES # BLD AUTO: 0 K/UL (ref 0–0.04)
IMM GRANULOCYTES NFR BLD AUTO: 0 % (ref 0–0.5)
LYMPHOCYTES # BLD: 0.9 K/UL (ref 0.8–3.5)
LYMPHOCYTES NFR BLD: 19 % (ref 12–49)
MCH RBC QN AUTO: 32 PG (ref 26–34)
MCHC RBC AUTO-ENTMCNC: 33.3 G/DL (ref 30–36.5)
MCV RBC AUTO: 96.1 FL (ref 80–99)
MONOCYTES # BLD: 0.4 K/UL (ref 0–1)
MONOCYTES NFR BLD: 7 % (ref 5–13)
NEUTS SEG # BLD: 3.4 K/UL (ref 1.8–8)
NEUTS SEG NFR BLD: 70 % (ref 32–75)
NRBC # BLD: 0 K/UL (ref 0–0.01)
NRBC BLD-RTO: 0 PER 100 WBC
P-R INTERVAL, ECG05: 100 MS
PLATELET # BLD AUTO: 226 K/UL (ref 150–400)
PMV BLD AUTO: 10.4 FL (ref 8.9–12.9)
POTASSIUM SERPL-SCNC: 4.2 MMOL/L (ref 3.5–5.1)
Q-T INTERVAL, ECG07: 438 MS
QRS DURATION, ECG06: 98 MS
QTC CALCULATION (BEZET), ECG08: 521 MS
RBC # BLD AUTO: 4.34 M/UL (ref 3.8–5.2)
SODIUM SERPL-SCNC: 139 MMOL/L (ref 136–145)
VENTRICULAR RATE, ECG03: 85 BPM
WBC # BLD AUTO: 4.9 K/UL (ref 3.6–11)

## 2019-03-04 PROCEDURE — 36415 COLL VENOUS BLD VENIPUNCTURE: CPT

## 2019-03-04 PROCEDURE — 93005 ELECTROCARDIOGRAM TRACING: CPT

## 2019-03-04 PROCEDURE — 85025 COMPLETE CBC W/AUTO DIFF WBC: CPT

## 2019-03-04 PROCEDURE — 80048 BASIC METABOLIC PNL TOTAL CA: CPT

## 2019-03-04 RX ORDER — ATENOLOL 25 MG/1
25 TABLET ORAL
COMMUNITY
End: 2019-04-26 | Stop reason: SDUPTHER

## 2019-03-04 NOTE — PROGRESS NOTES
PRE-OP ASSESSMENT AND MEDICATIONS REVIEWED , SURGICAL SITE INFECTION SHEET REVIEWED AND GIVEN .  OPPORTUNITY FOR QUESTIONS GIVEN , VERBALIZED UNDERSTANDING OF INFORMATION DISCUSSED

## 2019-03-08 ENCOUNTER — HOSPITAL ENCOUNTER (OUTPATIENT)
Dept: GENERAL RADIOLOGY | Age: 59
Discharge: HOME | End: 2019-03-08
Payer: MEDICAID

## 2019-03-08 DIAGNOSIS — M54.5 LOW BACK PAIN, UNSPECIFIED BACK PAIN LATERALITY, UNSPECIFIED CHRONICITY, WITH SCIATICA PRESENCE UNSPECIFIED: ICD-10-CM

## 2019-03-08 PROCEDURE — 72100 X-RAY EXAM L-S SPINE 2/3 VWS: CPT

## 2019-03-12 ENCOUNTER — TELEPHONE (OUTPATIENT)
Dept: FAMILY MEDICINE CLINIC | Age: 59
End: 2019-03-12

## 2019-03-12 NOTE — TELEPHONE ENCOUNTER
Called pt. Xray with some anterolisthesis of L4. Will discuss further and do a physical exam at her appt on 3/22/19. Consider physical therapy. She understands and was appreciative of the call.

## 2019-03-19 ENCOUNTER — OFFICE VISIT (OUTPATIENT)
Dept: NEUROLOGY | Age: 59
End: 2019-03-19

## 2019-03-19 VITALS
WEIGHT: 110 LBS | DIASTOLIC BLOOD PRESSURE: 84 MMHG | HEIGHT: 66 IN | OXYGEN SATURATION: 97 % | RESPIRATION RATE: 20 BRPM | BODY MASS INDEX: 17.68 KG/M2 | SYSTOLIC BLOOD PRESSURE: 138 MMHG | HEART RATE: 71 BPM

## 2019-03-19 DIAGNOSIS — G25.0 ESSENTIAL TREMOR: ICD-10-CM

## 2019-03-19 DIAGNOSIS — R25.1 TREMOR, ANXIETY RELATED: Primary | ICD-10-CM

## 2019-03-19 DIAGNOSIS — F41.9 TREMOR, ANXIETY RELATED: Primary | ICD-10-CM

## 2019-03-19 DIAGNOSIS — G44.221 CHRONIC TENSION-TYPE HEADACHE, INTRACTABLE: ICD-10-CM

## 2019-03-19 RX ORDER — GABAPENTIN 600 MG/1
TABLET ORAL
Qty: 49 TAB | Refills: 0 | Status: SHIPPED | OUTPATIENT
Start: 2019-03-19 | End: 2019-11-15

## 2019-03-19 NOTE — PROGRESS NOTES
Interval HPI:   This is a 61 y.o. female who is following up for     Chief Complaint   Patient presents with    Tremors     Follow Up     Tried propranolol and primidone but says she had to top taking due to severe headaches. Have not tried Topamax as pt reports having Sulfa Allergy (rash). PCP put her back on Atenolol. She continues to c/o tremors and stuttering. MRI Brain was unremarkable. We discussed the stuttering was psychological, anxiety (brother had passed away recently). Pt's tremors are unchanged. She also c/o frequent and severe HAs. She says that she tried amitriptyline but it made her suicidal.  She doesn't recall trying Gabapentin or Depakote. Brief ROS: as above or otherwise negative        Allergies   Allergen Reactions    Bacitracin Swelling    Bactrim [Sulfamethoprim Ds] Rash    Ciprofloxacin Swelling    Fosamax [Alendronate] Nausea Only    Imitrex [Sumatriptan Succinate] Other (comments)     Suicidal ideation    Keflex [Cephalexin] Nausea and Vomiting    Pcn [Penicillins] Swelling    Primidone Other (comments)     Headache, nightmares    Trazodone Other (comments)     Nightmares     Current Outpatient Medications   Medication Sig Dispense Refill    gabapentin (NEURONTIN) 600 mg tablet Take HALF tablet twice a day for 2 weeks, then increase to one tablet twice a day. To reduce tremors and headache frequency. 49 Tab 0    atenolol (TENORMIN) 25 mg tablet Take 25 mg by mouth every morning.  [START ON 4/1/2019] diazePAM (VALIUM) 5 mg tablet Take 1 Tab by mouth every twelve (12) hours as needed for Anxiety. Max Daily Amount: 10 mg. Not to fill before 4/1/19 60 Tab 0    cyclobenzaprine (FLEXERIL) 10 mg tablet TAKE ONE TABLET BY MOUTH ONCE DAILY AS NEEDED FOR MUSCLE TENSION HEADACHE (Patient taking differently: nightly as needed.  TAKE ONE TABLET BY MOUTH ONCE DAILY AS NEEDED FOR MUSCLE TENSION HEADACHE) 30 Tab 2    nitroglycerin (NITROSTAT) 0.4 mg SL tablet 1 Tab by SubLINGual route every five (5) minutes as needed for Chest Pain. For 2 doses. If cp is unrelieved after second dose call 911. 1 Bottle 1    ergocalciferol (ERGOCALCIFEROL) 50,000 unit capsule Take 1 Cap by mouth every seven (7) days. For 8 weeks and then once monthly. 12 Cap 3    HYDROcodone-acetaminophen (NORCO) 7.5-325 mg per tablet Take 1 Tab by mouth every six (6) hours as needed for Pain for up to 7 days. Max Daily Amount: 4 Tabs. Indications: surgical 28 Tab 0    azithromycin (ZITHROMAX) 250 mg tablet Take 2 tabs day 1 then 1 tab per day for four days, 6 tabs. 6 Tab 0    rizatriptan (MAXALT) 5 mg tablet   2    albuterol (PROVENTIL HFA) 90 mcg/actuation inhaler Take 1 Puff by inhalation every six (6) hours as needed for Wheezing or Shortness of Breath.  1 Inhaler 1    OTHER Four drops twice a day         Past Medical History:   Diagnosis Date    Agoraphobia     Anxiety     CAD (coronary artery disease)     Chronic obstructive pulmonary disease (HCC)     Claustrophobia     History of mammography, screening     Normal    Ill-defined condition     WPW    Ill-defined condition     speech impairment    Pap smear for cervical cancer screening several years    Psychiatric disorder     depression, anxiety    Stuttering     Tremors of nervous system     Fazal-Parkinson-White (WPW) pattern        Past Surgical History:   Procedure Laterality Date    HX HYSTERECTOMY         Family History   Problem Relation Age of Onset    Cancer Father         lung,  age 58    Alzheimer Father     Breast Cancer Paternal Aunt     Hypertension Mother     Alzheimer Mother     Osteoporosis Mother     COPD Mother     Arthritis-osteo Mother     Downs Syndrome Brother     No Known Problems Son     Obesity Daughter     Depression Daughter     Anesth Problems Neg Hx        Social History     Socioeconomic History    Marital status:      Spouse name: Not on file    Number of children: Not on file  Years of education: Not on file    Highest education level: Not on file   Occupational History    Not on file   Social Needs    Financial resource strain: Not on file    Food insecurity:     Worry: Not on file     Inability: Not on file    Transportation needs:     Medical: Not on file     Non-medical: Not on file   Tobacco Use    Smoking status: Former Smoker     Packs/day: 1.00     Years: 25.00     Pack years: 25.00     Types: Cigarettes     Last attempt to quit: 2018     Years since quittin.2    Smokeless tobacco: Never Used    Tobacco comment: 8 cig a day    Substance and Sexual Activity    Alcohol use: No     Alcohol/week: 0.0 oz    Drug use: Yes     Types: Marijuana    Sexual activity: Yes   Lifestyle    Physical activity:     Days per week: Not on file     Minutes per session: Not on file    Stress: Not on file   Relationships    Social connections:     Talks on phone: Not on file     Gets together: Not on file     Attends [employer] service: Not on file     Active member of club or organization: Not on file     Attends meetings of clubs or organizations: Not on file     Relationship status: Not on file    Intimate partner violence:     Fear of current or ex partner: Not on file     Emotionally abused: Not on file     Physically abused: Not on file     Forced sexual activity: Not on file   Other Topics Concern    Not on file   Social History Narrative    Not on file         Physical Exam  Blood pressure 138/84, pulse 71, resp. rate 20, height 5' 6\" (1.676 m), weight 49.9 kg (110 lb), SpO2 97 %. No acute distress  Neck: no stiffness  Skin: no rashes    Focused Neurological Exam     Mental status: Alert and oriented to person, place situation. Language: normal comprehension, stuttering speech    CNs:   Visual fields grossly normal  Extraocular movements intact, no nystagmus  Face appears symmetric and facial strength normal.    Hearing is intact to casual conversation. Sensory: intact light touch in all 4 extremities  Motor: Normal bulk and strength in all 4 extremities. Reflexes: DTRs are symmetric, 2+   Cerebellar: no resting tremors; + postural/ intention tremors bilateral  Gait: normal    Impression      ICD-10-CM ICD-9-CM    1. Tremor, anxiety related R25.1 781.0 gabapentin (NEURONTIN) 600 mg tablet    F41.9 300.00    2. Essential tremor G25.0 333.1 gabapentin (NEURONTIN) 600 mg tablet   3. Chronic tension-type headache, intractable G44.221 339.12 gabapentin (NEURONTIN) 600 mg tablet       61 y.o. female with psychological speech changes/ stuttering after finding a brother who committed suicide. Also has severe anxiety, PTSD and frequent headaches. We discussed that the mood / Psychiatric issues are the source of her tremors, headaches, and speech trouble. Encouraged her to see Psychiatrist and/ or Counselor. Will try Gabapentin 600 mg HALF tab BID x 2 weeks, then increase to one full tablet twice a day. Common SEFx discussed with her and advised her that if it makes her more depressed to stop taking it. Discussed that if she feel suicidal, stop medication and call 911 immediately. She expressed understanding.      Follow up in 3 months

## 2019-03-19 NOTE — PROGRESS NOTES
Patient is here for a follow up for tremors and stuttering. She states that she has had bad side effects from the primidone and propranolol, she has severe headaches from them.

## 2019-03-22 ENCOUNTER — OFFICE VISIT (OUTPATIENT)
Dept: FAMILY MEDICINE CLINIC | Age: 59
End: 2019-03-22

## 2019-03-22 VITALS
HEIGHT: 66 IN | RESPIRATION RATE: 18 BRPM | HEART RATE: 93 BPM | OXYGEN SATURATION: 98 % | WEIGHT: 115 LBS | DIASTOLIC BLOOD PRESSURE: 87 MMHG | BODY MASS INDEX: 18.48 KG/M2 | SYSTOLIC BLOOD PRESSURE: 138 MMHG | TEMPERATURE: 98.3 F

## 2019-03-22 DIAGNOSIS — M54.42 BILATERAL LOW BACK PAIN WITH LEFT-SIDED SCIATICA, UNSPECIFIED CHRONICITY: Primary | ICD-10-CM

## 2019-03-22 DIAGNOSIS — Z87.891 SMOKING HISTORY: ICD-10-CM

## 2019-03-22 DIAGNOSIS — R25.1 TREMOR: ICD-10-CM

## 2019-03-22 DIAGNOSIS — F32.A DEPRESSION, UNSPECIFIED DEPRESSION TYPE: ICD-10-CM

## 2019-03-22 RX ORDER — ALBUTEROL SULFATE 90 UG/1
1 AEROSOL, METERED RESPIRATORY (INHALATION)
COMMUNITY
End: 2019-03-22 | Stop reason: SDUPTHER

## 2019-03-22 RX ORDER — RIZATRIPTAN BENZOATE 5 MG/1
TABLET ORAL
Refills: 2 | COMMUNITY
Start: 2019-03-07 | End: 2019-04-26 | Stop reason: SDUPTHER

## 2019-03-22 RX ORDER — ALBUTEROL SULFATE 90 UG/1
1 AEROSOL, METERED RESPIRATORY (INHALATION)
Qty: 1 INHALER | Refills: 1 | Status: SHIPPED | OUTPATIENT
Start: 2019-03-22

## 2019-03-22 RX ORDER — PRIMIDONE 50 MG/1
TABLET ORAL
Refills: 1 | COMMUNITY
Start: 2019-02-13 | End: 2019-03-22

## 2019-03-22 NOTE — PATIENT INSTRUCTIONS
Learning About Positive Thinking  What is positive thinking? Positive thinking, or healthy thinking, is a way to help you stay well or cope with a health problem by changing how you think. It's based on research that shows that you can change how you think. And how you think affects how you feel. Cognitive-behavioral therapy, also called CBT, is a therapy that is often used to help people think in a healthy way. It focuses on thought (cognitive) and action (behavioral). How can positive thinking help you? If you think in a positive way, you may be more able to care for yourself and handle life's challenges. You will feel better. And you may be more able to avoid or cope with stress, anxiety, and depression. CBT may be able to help you sleep better and lose weight. How can you get started with positive thinking? CBT involves techniques that you can practice every day so that healthy thinking comes naturally. Here are the steps for one technique. 1. Stop. When you notice a negative thought, stop it in its tracks and write it down. 2. Ask. Look at that thought and ask yourself whether it is helpful or unhelpful right now. 3. Choose. Choose a new, helpful thought to replace a negative one. Here's an example of how this might work:  · In a job review, your boss praised several things about your work. But you're feeling down because she had one small criticism. You might even think, \"I'm no good at my job\" or \"She doesn't like me. I must be bad. \" These are negative thoughts. You want to stop them. · Ask yourself questions about the situation and your negative thoughts. You might ask, \"What did my boss say exactly? \" \"Were there positive comments? \" \"Why do I focus only on one criticism? \" Your answers can help you find more accurate and helpful statements. · Now choose a helpful thought to replace the negative thoughts.  For example, you might think, \"I've done a lot of good work this year, and my boss noticed it. She thought there was one area I can improve. So I'll think of some things I can do to get stronger in that area. \"  With time and practice, you can learn to see that the harsh things you say to yourself may keep you from enjoying your life and work. You can replace them with more helpful thoughts. Where can you learn more? Go to http://vinita-jade.info/. Enter G282 in the search box to learn more about \"Learning About Positive Thinking. \"  Current as of: September 11, 2018  Content Version: 11.9  © 2808-2143 [email or web address], [employer]. Care instructions adapted under license by [employer] (which disclaims liability or warranty for this information). If you have questions about a medical condition or this instruction, always ask your healthcare professional. [address] any warranty or liability for your use of this information.

## 2019-03-22 NOTE — PROGRESS NOTES
CC: depression, back pain    History of Present Illness:  Alma Delia Zuniga is a 61 y.o. female. She was last seen on 2/22/19. At that time, she was prescribed remeron for her anxiety to be restarted after she completed chantix. She has not been taking the remeron, because she said she got another nightmare when she tried it again. She was prescribed a refill of valium, bid rather than tid, as it is being weaned with a goal to stop it over the next few months. She is now taking the valium one in the morning and half tab at night. Since then, her depression and anxiety has been \"ok. \" She denies SI/HI. She has not seen a psychiatrist or psychologist; she says she has not found one that will accept her insurance. She declines further medication adjustment at this time. She complains of low back pain. Located bilateral lower back. Radiates to her left buttock and upper leg. Has been present for a little over a year. 7/10. Exacerbating factors including bending forwards or backwards. Relieving factors heating pad, hot bath. Has tried flexeril which didn't help. Denies prior trauma, injury, or surgery to the area. Denies leg weakness, numbness, tingling,gait problems,  fevers, chills, bowel or bladder incontinence or retention, saddle anesthesia. Lumbar plain film on 3/8/19 revealed a levoconvex scoliosis centered in the mid lumbar spine and a 2mm grade 1 anterolisthesis of L4 with respect to L5. She recently saw her neurologist who started her on neurontin for essential tremor and chronic headaches. She says it is helping. She had stopped primidone which she says was giving her headaches. She says she has completed the chantix and has stopped smoking cigarettes and marijuana. She takes albuterol prn for some occasional wheezing. She does not need this often. She needs a refill.      Allergies   Allergen Reactions    Bacitracin Swelling    Bactrim [Sulfamethoprim Ds] Rash    Ciprofloxacin Swelling    Fosamax [Alendronate] Nausea Only    Imitrex [Sumatriptan Succinate] Other (comments)     Suicidal ideation    Keflex [Cephalexin] Nausea and Vomiting    Pcn [Penicillins] Swelling    Trazodone Other (comments)     Nightmares     Current Outpatient Medications   Medication Sig Dispense Refill    rizatriptan (MAXALT) 5 mg tablet   2    gabapentin (NEURONTIN) 600 mg tablet Take HALF tablet twice a day for 2 weeks, then increase to one tablet twice a day. To reduce tremors and headache frequency. 49 Tab 0    atenolol (TENORMIN) 25 mg tablet Take 25 mg by mouth every morning.  [START ON 4/1/2019] diazePAM (VALIUM) 5 mg tablet Take 1 Tab by mouth every twelve (12) hours as needed for Anxiety. Max Daily Amount: 10 mg. Not to fill before 4/1/19 60 Tab 0    OTHER Four drops twice a day      cyclobenzaprine (FLEXERIL) 10 mg tablet TAKE ONE TABLET BY MOUTH ONCE DAILY AS NEEDED FOR MUSCLE TENSION HEADACHE (Patient taking differently: nightly as needed. TAKE ONE TABLET BY MOUTH ONCE DAILY AS NEEDED FOR MUSCLE TENSION HEADACHE) 30 Tab 2    nitroglycerin (NITROSTAT) 0.4 mg SL tablet 1 Tab by SubLINGual route every five (5) minutes as needed for Chest Pain. For 2 doses. If cp is unrelieved after second dose call 911. 1 Bottle 1    ergocalciferol (ERGOCALCIFEROL) 50,000 unit capsule Take 1 Cap by mouth every seven (7) days. For 8 weeks and then once monthly. 12 Cap 3    aspirin delayed-release 81 mg tablet Take 1 Tab by mouth daily.  1 Tab 0     Patient Active Problem List   Diagnosis Code    Tympanic membrane rupture H72.90    Long Q-T syndrome I45.81    WPW (Fazal-Parkinson-White syndrome) I45.6    Panic disorder with agoraphobia F40.01    Generalized anxiety disorder F41.1    Migraine G43.909    Hyperlipidemia E78.5    Osteoporosis M81.0    Chest pain R07.9    Palpitations R00.2    Acquired hypothyroidism E03.9       Past Medical History:   Diagnosis Date    Agoraphobia     Anxiety     CAD (coronary artery disease)     Chronic obstructive pulmonary disease ([facility] [address]     Claustrophobia     History of mammography, screening     Normal    Ill-defined condition     WPW    Ill-defined condition     speech impairment    Pap smear for cervical cancer screening several years    Psychiatric disorder     depression, anxiety    Stuttering     Tremors of nervous system     Fazal-Parkinson-White (WPW) pattern      Past Surgical History:   Procedure Laterality Date    HX HYSTERECTOMY       Social History     Socioeconomic History    Marital status:      Spouse name: Not on file    Number of children: Not on file    Years of education: Not on file    Highest education level: Not on file   Tobacco Use    Smoking status: Former Smoker     Packs/day: 1.00     Years: 25.00     Pack years: 25.00     Types: Cigarettes     Last attempt to quit: 2018     Years since quittin.2    Smokeless tobacco: Never Used    Tobacco comment: 8 cig a day    Substance and Sexual Activity    Alcohol use: No     Alcohol/week: 0.0 oz    Drug use: Yes     Types: Marijuana    Sexual activity: Yes     Family History   Problem Relation Age of Onset    Cancer Father         lung,  age 58    Alzheimer Father     Breast Cancer Paternal Aunt     Hypertension Mother     Alzheimer Mother     Osteoporosis Mother     COPD Mother     Arthritis-osteo Mother     Downs Syndrome Brother     No Known Problems Son     Obesity Daughter     Depression Daughter     Anesth Problems Neg Hx          Review of Systems   Constitutional: Negative for chills, fever and weight loss. Respiratory: Negative for shortness of breath. Cardiovascular: Negative for chest pain. Musculoskeletal: Positive for back pain. Skin: Negative for rash. Neurological: Positive for tremors. Negative for dizziness, tingling, sensory change, focal weakness and weakness. Psychiatric/Behavioral: Positive for depression.  Negative for suicidal ideas. The patient is nervous/anxious. Physical Exam:  Visit Vitals  /87 (BP 1 Location: Right arm, BP Patient Position: Sitting)   Pulse 93   Temp 98.3 °F (36.8 °C)   Resp 18   Ht 5' 6\" (1.676 m)   Wt 115 lb (52.2 kg)   SpO2 98%   BMI 18.56 kg/m²     Physical Exam   Constitutional: She is oriented to person, place, and time. No distress. Cardiovascular: Normal rate, regular rhythm and normal heart sounds. Pulmonary/Chest: Effort normal and breath sounds normal.   Musculoskeletal:        Lumbar back: She exhibits tenderness (bilateral paraspinal muscle tenderness). She exhibits normal range of motion, no bony tenderness, no swelling, no edema and no deformity. Bilateral straight leg raise positive   Neurological: She is alert and oriented to person, place, and time. She has normal strength. No sensory deficit. Gait normal. She displays no Babinski's sign on the right side. She displays no Babinski's sign on the left side. Reflex Scores:       Patellar reflexes are 2+ on the right side and 2+ on the left side. Achilles reflexes are 2+ on the right side and 2+ on the left side. Voice tremor   Psychiatric: She has a normal mood and affect. Assessment/Plan:  1. Bilateral low back pain with left-sided sciatica, unspecified chronicity  No red flag signs or symptoms. Will refer for PT.   - REFERRAL TO PHYSICAL THERAPY    2. Depression, unspecified depression type  Subjectively doing \"ok. \" No SI/HI. She declines new medications at this time. Recommend calling her insurance to see who they will pay for her to see for therapy.   - REFERRAL TO PSYCHIATRY    3. Tremor  Subjectively improving on neurontin. Followed by neurology. 4. Smoking history  She has fortunately quit. Will refill albuterol for occasional wheezing. Consider PFTs if it persists. Follow up in 6 weeks. Diagnoses, tests, plan, and follow up discussed with patient.   I have given to and reviewed with the patient the after visit summary. I have reviewed medication side effects and precautions. Patient expressed agreement and understanding. All questions were answered. Pt seen and discussed with Dr. Jacques Terry.

## 2019-03-22 NOTE — LETTER
3/22/2019 2:31 PM 
 
Ms. sAia Obregon [address] [facility] [address] Ms. Asia Obregon [address] [address] To Whom It May Concern: 
 
Asia Obregon is currently under the care of [address] [facility]. I have been caring for Ms. Santo for the last 6 months. She suffers from several chronic conditions that support her inability to work for the extended future. Her medical and psychologic conditions include essential tremor, speech impediment (significant stuttering), severe anxiety with agoraphobia, PTSD, migraine headaches, osteoporosis, scoliosis. In my medical opinion, she will likely be unable to work in a meaningful capacity for the next 2 years. If there are questions or concerns please have the patient contact our office. Sincerely, Ramón Jacobo MD

## 2019-03-22 NOTE — PROGRESS NOTES
I saw and evaluated pt with resident. I reviewed with the resident the patient's medical history and the resident's history and findings on the physical examination. I discussed assessment and plan with the resident and concur with the findings and plan as documented in the resident's note.    61 y.o. female with PMH of severe anxiety with agoraphobia, depression, PTSD vs complicated grief, tobacco dependence, osteoporosis, lumbago, frequent headaches. Pt well known to me. Depression/anxiety and pt's ability to cope with stressors is improving. She has come to the last 2 appointments alone, where previously was always accompanied by her partner. She has been tobacco free for 4.5 months now and says she has no desire to smoke again. She is establised with neurology who is helping her with essential tremor (much improved), stutter and chronic headaches. She reports gabapentin is helping her back pain, tremor and insomnia. She was instructed to take 300mg (1/2 of a 600mg tab) bid, but feels the am dose makes her two drowsy. She appreciates this at bedtime dose and is sleeping through the night. I recommended she try gabapentin 150mg (1/4 tab, pt says she can do this easily with pill cutter at home) midday and 300mg nightly. Given she is sleeping well, I encouraged her to try to not use valium 2.5mg (1/2 of 5mg tab) at night and see how it goes - will have it for prn use if mind is racing, anxiety affects sleep onset despite gabapentin. She agrees to try this starting tonight. Has majority of the 3/6/19 valium rx left in bottle (brought along with other med bottles today). She has additional rx for 60 tabs to fill after 4/1/19 if needed. She will work on getting in with PT and psychotherapy before next follow up.     Garcia Santacruz MD

## 2019-03-22 NOTE — PROGRESS NOTES
Oneyda Quintero is a 61 y.o. female      Chief Complaint   Patient presents with    Follow-up         1. Have you been to the ER, urgent care clinic since your last visit? Hospitalized since your last visit? No      2. Have you seen or consulted any other health care providers outside of the [address] [place] [address] since your last visit? Include any pap smears or colon screening.   No

## 2019-03-26 ENCOUNTER — TELEPHONE (OUTPATIENT)
Dept: FAMILY MEDICINE CLINIC | Age: 59
End: 2019-03-26

## 2019-03-26 NOTE — TELEPHONE ENCOUNTER
Patient called the office. Patient reports that she is having a stiffness and pain in her back and legs. She has taken pain killers with no relief and been using a heating pad for 2 days.   Seeking advice

## 2019-03-27 ENCOUNTER — ANESTHESIA EVENT (OUTPATIENT)
Dept: SURGERY | Age: 59
End: 2019-03-27
Payer: MEDICAID

## 2019-03-28 ENCOUNTER — ANESTHESIA (OUTPATIENT)
Dept: SURGERY | Age: 59
End: 2019-03-28
Payer: MEDICAID

## 2019-03-28 ENCOUNTER — HOSPITAL ENCOUNTER (OUTPATIENT)
Age: 59
Setting detail: OUTPATIENT SURGERY
Discharge: HOME | End: 2019-03-28
Attending: OTOLARYNGOLOGY | Admitting: OTOLARYNGOLOGY
Payer: MEDICAID

## 2019-03-28 VITALS
RESPIRATION RATE: 16 BRPM | TEMPERATURE: 97.7 F | WEIGHT: 110 LBS | SYSTOLIC BLOOD PRESSURE: 125 MMHG | OXYGEN SATURATION: 100 % | HEIGHT: 66 IN | HEART RATE: 95 BPM | BODY MASS INDEX: 17.68 KG/M2 | DIASTOLIC BLOOD PRESSURE: 80 MMHG

## 2019-03-28 DIAGNOSIS — L76.82 PAIN AT SURGICAL INCISION: Primary | ICD-10-CM

## 2019-03-28 PROBLEM — H70.11 CHRONIC MASTOIDITIS, RIGHT EAR: Status: ACTIVE | Noted: 2019-03-28

## 2019-03-28 PROCEDURE — 77030008684 HC TU ET CUF COVD -B: Performed by: ANESTHESIOLOGY

## 2019-03-28 PROCEDURE — 77030016661 HC BUR RND1 STRY -C: Performed by: OTOLARYNGOLOGY

## 2019-03-28 PROCEDURE — 77030002996 HC SUT SLK J&J -A: Performed by: OTOLARYNGOLOGY

## 2019-03-28 PROCEDURE — 76210000006 HC OR PH I REC 0.5 TO 1 HR: Performed by: OTOLARYNGOLOGY

## 2019-03-28 PROCEDURE — 74011250636 HC RX REV CODE- 250/636

## 2019-03-28 PROCEDURE — 77030017014 HC DRSG SNUS MRGEL4 MEDT -B: Performed by: OTOLARYNGOLOGY

## 2019-03-28 PROCEDURE — 74011250637 HC RX REV CODE- 250/637: Performed by: ANESTHESIOLOGY

## 2019-03-28 PROCEDURE — 74011250636 HC RX REV CODE- 250/636: Performed by: ANESTHESIOLOGY

## 2019-03-28 PROCEDURE — 77030020268 HC MISC GENERAL SUPPLY: Performed by: OTOLARYNGOLOGY

## 2019-03-28 PROCEDURE — 77030026438 HC STYL ET INTUB CARD -A: Performed by: ANESTHESIOLOGY

## 2019-03-28 PROCEDURE — 77030018836 HC SOL IRR NACL ICUM -A: Performed by: OTOLARYNGOLOGY

## 2019-03-28 PROCEDURE — 77030018846 HC SOL IRR STRL H20 ICUM -A: Performed by: OTOLARYNGOLOGY

## 2019-03-28 PROCEDURE — 77030004435 HC BUR RND STRY -C: Performed by: OTOLARYNGOLOGY

## 2019-03-28 PROCEDURE — 77030011648 HC PK NSL MEROCL MEDT -B: Performed by: OTOLARYNGOLOGY

## 2019-03-28 PROCEDURE — 77030002974 HC SUT PLN J&J -A: Performed by: OTOLARYNGOLOGY

## 2019-03-28 PROCEDURE — 77030011640 HC PAD GRND REM COVD -A: Performed by: OTOLARYNGOLOGY

## 2019-03-28 PROCEDURE — 74011000250 HC RX REV CODE- 250

## 2019-03-28 PROCEDURE — 74011000250 HC RX REV CODE- 250: Performed by: OTOLARYNGOLOGY

## 2019-03-28 PROCEDURE — 76060000036 HC ANESTHESIA 2.5 TO 3 HR: Performed by: OTOLARYNGOLOGY

## 2019-03-28 PROCEDURE — 76010000172 HC OR TIME 2.5 TO 3 HR INTENSV-TIER 1: Performed by: OTOLARYNGOLOGY

## 2019-03-28 PROCEDURE — 77030014007 HC SPNG HEMSTAT J&J -B: Performed by: OTOLARYNGOLOGY

## 2019-03-28 PROCEDURE — 77030031139 HC SUT VCRL2 J&J -A: Performed by: OTOLARYNGOLOGY

## 2019-03-28 PROCEDURE — 77030018687: Performed by: OTOLARYNGOLOGY

## 2019-03-28 PROCEDURE — 76210000020 HC REC RM PH II FIRST 0.5 HR: Performed by: OTOLARYNGOLOGY

## 2019-03-28 PROCEDURE — 77030020263 HC SOL INJ SOD CL0.9% LFCR 1000ML: Performed by: OTOLARYNGOLOGY

## 2019-03-28 PROCEDURE — 77030032490 HC SLV COMPR SCD KNE COVD -B: Performed by: OTOLARYNGOLOGY

## 2019-03-28 PROCEDURE — 77030006689 HC BLD OPHTH BVR BD -A: Performed by: OTOLARYNGOLOGY

## 2019-03-28 PROCEDURE — 77030010359 HC TU EAR MEDT -B: Performed by: OTOLARYNGOLOGY

## 2019-03-28 PROCEDURE — 77030019615 HC ELCTRD EMG NDL MEDT -B: Performed by: OTOLARYNGOLOGY

## 2019-03-28 PROCEDURE — 77030006671 HC BLD MYRIN BVR BD -A: Performed by: OTOLARYNGOLOGY

## 2019-03-28 PROCEDURE — 88304 TISSUE EXAM BY PATHOLOGIST: CPT

## 2019-03-28 PROCEDURE — 77030029099 HC BN WAX SSPC -A: Performed by: OTOLARYNGOLOGY

## 2019-03-28 RX ORDER — MIDAZOLAM HYDROCHLORIDE 1 MG/ML
1 INJECTION, SOLUTION INTRAMUSCULAR; INTRAVENOUS AS NEEDED
Status: DISCONTINUED | OUTPATIENT
Start: 2019-03-28 | End: 2019-03-28 | Stop reason: HOSPADM

## 2019-03-28 RX ORDER — MIDAZOLAM HYDROCHLORIDE 1 MG/ML
INJECTION, SOLUTION INTRAMUSCULAR; INTRAVENOUS AS NEEDED
Status: DISCONTINUED | OUTPATIENT
Start: 2019-03-28 | End: 2019-03-28 | Stop reason: HOSPADM

## 2019-03-28 RX ORDER — HYDROCODONE BITARTRATE AND ACETAMINOPHEN 7.5; 325 MG/1; MG/1
1 TABLET ORAL
Qty: 28 TAB | Refills: 0 | Status: SHIPPED | OUTPATIENT
Start: 2019-03-28 | End: 2019-04-04

## 2019-03-28 RX ORDER — ONDANSETRON 2 MG/ML
4 INJECTION INTRAMUSCULAR; INTRAVENOUS AS NEEDED
Status: DISCONTINUED | OUTPATIENT
Start: 2019-03-28 | End: 2019-03-28 | Stop reason: HOSPADM

## 2019-03-28 RX ORDER — MORPHINE SULFATE 10 MG/ML
2 INJECTION, SOLUTION INTRAMUSCULAR; INTRAVENOUS
Status: DISCONTINUED | OUTPATIENT
Start: 2019-03-28 | End: 2019-03-28 | Stop reason: HOSPADM

## 2019-03-28 RX ORDER — SODIUM CHLORIDE 9 MG/ML
25 INJECTION, SOLUTION INTRAVENOUS CONTINUOUS
Status: DISCONTINUED | OUTPATIENT
Start: 2019-03-28 | End: 2019-03-28 | Stop reason: HOSPADM

## 2019-03-28 RX ORDER — MIDAZOLAM HYDROCHLORIDE 1 MG/ML
0.5 INJECTION, SOLUTION INTRAMUSCULAR; INTRAVENOUS
Status: DISCONTINUED | OUTPATIENT
Start: 2019-03-28 | End: 2019-03-28 | Stop reason: HOSPADM

## 2019-03-28 RX ORDER — BUPIVACAINE HYDROCHLORIDE AND EPINEPHRINE 5; 5 MG/ML; UG/ML
INJECTION, SOLUTION EPIDURAL; INTRACAUDAL; PERINEURAL AS NEEDED
Status: DISCONTINUED | OUTPATIENT
Start: 2019-03-28 | End: 2019-03-28 | Stop reason: HOSPADM

## 2019-03-28 RX ORDER — ACETAMINOPHEN 325 MG/1
650 TABLET ORAL ONCE
Status: COMPLETED | OUTPATIENT
Start: 2019-03-28 | End: 2019-03-28

## 2019-03-28 RX ORDER — SODIUM CHLORIDE, SODIUM LACTATE, POTASSIUM CHLORIDE, CALCIUM CHLORIDE 600; 310; 30; 20 MG/100ML; MG/100ML; MG/100ML; MG/100ML
100 INJECTION, SOLUTION INTRAVENOUS CONTINUOUS
Status: DISCONTINUED | OUTPATIENT
Start: 2019-03-28 | End: 2019-03-28 | Stop reason: HOSPADM

## 2019-03-28 RX ORDER — LIDOCAINE HYDROCHLORIDE 20 MG/ML
INJECTION, SOLUTION EPIDURAL; INFILTRATION; INTRACAUDAL; PERINEURAL AS NEEDED
Status: DISCONTINUED | OUTPATIENT
Start: 2019-03-28 | End: 2019-03-28 | Stop reason: HOSPADM

## 2019-03-28 RX ORDER — PHENYLEPHRINE HCL IN 0.9% NACL 0.4MG/10ML
SYRINGE (ML) INTRAVENOUS AS NEEDED
Status: DISCONTINUED | OUTPATIENT
Start: 2019-03-28 | End: 2019-03-28 | Stop reason: HOSPADM

## 2019-03-28 RX ORDER — PROPOFOL 10 MG/ML
INJECTION, EMULSION INTRAVENOUS AS NEEDED
Status: DISCONTINUED | OUTPATIENT
Start: 2019-03-28 | End: 2019-03-28 | Stop reason: HOSPADM

## 2019-03-28 RX ORDER — ROCURONIUM BROMIDE 10 MG/ML
INJECTION, SOLUTION INTRAVENOUS AS NEEDED
Status: DISCONTINUED | OUTPATIENT
Start: 2019-03-28 | End: 2019-03-28 | Stop reason: HOSPADM

## 2019-03-28 RX ORDER — ONDANSETRON 2 MG/ML
INJECTION INTRAMUSCULAR; INTRAVENOUS AS NEEDED
Status: DISCONTINUED | OUTPATIENT
Start: 2019-03-28 | End: 2019-03-28 | Stop reason: HOSPADM

## 2019-03-28 RX ORDER — AZITHROMYCIN 250 MG/1
TABLET, FILM COATED ORAL
Qty: 6 TAB | Refills: 0 | Status: SHIPPED | OUTPATIENT
Start: 2019-03-28 | End: 2019-04-26

## 2019-03-28 RX ORDER — SUCCINYLCHOLINE CHLORIDE 20 MG/ML
INJECTION INTRAMUSCULAR; INTRAVENOUS AS NEEDED
Status: DISCONTINUED | OUTPATIENT
Start: 2019-03-28 | End: 2019-03-28 | Stop reason: HOSPADM

## 2019-03-28 RX ORDER — FENTANYL CITRATE 50 UG/ML
50 INJECTION, SOLUTION INTRAMUSCULAR; INTRAVENOUS AS NEEDED
Status: DISCONTINUED | OUTPATIENT
Start: 2019-03-28 | End: 2019-03-28 | Stop reason: HOSPADM

## 2019-03-28 RX ORDER — OXYCODONE HYDROCHLORIDE 5 MG/1
5 TABLET ORAL AS NEEDED
Status: DISCONTINUED | OUTPATIENT
Start: 2019-03-28 | End: 2019-03-28 | Stop reason: HOSPADM

## 2019-03-28 RX ORDER — SODIUM CHLORIDE, SODIUM LACTATE, POTASSIUM CHLORIDE, CALCIUM CHLORIDE 600; 310; 30; 20 MG/100ML; MG/100ML; MG/100ML; MG/100ML
1000 INJECTION, SOLUTION INTRAVENOUS CONTINUOUS
Status: DISCONTINUED | OUTPATIENT
Start: 2019-03-28 | End: 2019-03-28 | Stop reason: HOSPADM

## 2019-03-28 RX ORDER — FENTANYL CITRATE 50 UG/ML
INJECTION, SOLUTION INTRAMUSCULAR; INTRAVENOUS AS NEEDED
Status: DISCONTINUED | OUTPATIENT
Start: 2019-03-28 | End: 2019-03-28 | Stop reason: HOSPADM

## 2019-03-28 RX ORDER — DEXAMETHASONE SODIUM PHOSPHATE 4 MG/ML
INJECTION, SOLUTION INTRA-ARTICULAR; INTRALESIONAL; INTRAMUSCULAR; INTRAVENOUS; SOFT TISSUE AS NEEDED
Status: DISCONTINUED | OUTPATIENT
Start: 2019-03-28 | End: 2019-03-28 | Stop reason: HOSPADM

## 2019-03-28 RX ORDER — ROPIVACAINE HYDROCHLORIDE 5 MG/ML
150 INJECTION, SOLUTION EPIDURAL; INFILTRATION; PERINEURAL AS NEEDED
Status: DISCONTINUED | OUTPATIENT
Start: 2019-03-28 | End: 2019-03-28 | Stop reason: HOSPADM

## 2019-03-28 RX ORDER — LIDOCAINE HYDROCHLORIDE 10 MG/ML
0.1 INJECTION, SOLUTION EPIDURAL; INFILTRATION; INTRACAUDAL; PERINEURAL AS NEEDED
Status: DISCONTINUED | OUTPATIENT
Start: 2019-03-28 | End: 2019-03-28 | Stop reason: HOSPADM

## 2019-03-28 RX ORDER — FENTANYL CITRATE 50 UG/ML
25 INJECTION, SOLUTION INTRAMUSCULAR; INTRAVENOUS
Status: DISCONTINUED | OUTPATIENT
Start: 2019-03-28 | End: 2019-03-28 | Stop reason: HOSPADM

## 2019-03-28 RX ADMIN — PROPOFOL 40 MG: 10 INJECTION, EMULSION INTRAVENOUS at 15:33

## 2019-03-28 RX ADMIN — DEXAMETHASONE SODIUM PHOSPHATE 8 MG: 4 INJECTION, SOLUTION INTRA-ARTICULAR; INTRALESIONAL; INTRAMUSCULAR; INTRAVENOUS; SOFT TISSUE at 15:26

## 2019-03-28 RX ADMIN — ROCURONIUM BROMIDE 5 MG: 10 INJECTION, SOLUTION INTRAVENOUS at 15:13

## 2019-03-28 RX ADMIN — MIDAZOLAM HYDROCHLORIDE 2 MG: 1 INJECTION, SOLUTION INTRAMUSCULAR; INTRAVENOUS at 15:06

## 2019-03-28 RX ADMIN — FENTANYL CITRATE 25 MCG: 50 INJECTION INTRAMUSCULAR; INTRAVENOUS at 18:21

## 2019-03-28 RX ADMIN — LIDOCAINE HYDROCHLORIDE 40 MG: 20 INJECTION, SOLUTION EPIDURAL; INFILTRATION; INTRACAUDAL; PERINEURAL at 15:13

## 2019-03-28 RX ADMIN — SODIUM CHLORIDE, SODIUM LACTATE, POTASSIUM CHLORIDE, AND CALCIUM CHLORIDE 1000 ML: 600; 310; 30; 20 INJECTION, SOLUTION INTRAVENOUS at 13:45

## 2019-03-28 RX ADMIN — SODIUM CHLORIDE, SODIUM LACTATE, POTASSIUM CHLORIDE, AND CALCIUM CHLORIDE: 600; 310; 30; 20 INJECTION, SOLUTION INTRAVENOUS at 14:57

## 2019-03-28 RX ADMIN — PROPOFOL 80 MG: 10 INJECTION, EMULSION INTRAVENOUS at 15:13

## 2019-03-28 RX ADMIN — SODIUM CHLORIDE, SODIUM LACTATE, POTASSIUM CHLORIDE, AND CALCIUM CHLORIDE: 600; 310; 30; 20 INJECTION, SOLUTION INTRAVENOUS at 17:35

## 2019-03-28 RX ADMIN — Medication 80 MCG: at 15:28

## 2019-03-28 RX ADMIN — SUCCINYLCHOLINE CHLORIDE 80 MG: 20 INJECTION INTRAMUSCULAR; INTRAVENOUS at 15:13

## 2019-03-28 RX ADMIN — ACETAMINOPHEN 650 MG: 325 TABLET ORAL at 13:47

## 2019-03-28 RX ADMIN — ONDANSETRON 4 MG: 2 INJECTION INTRAMUSCULAR; INTRAVENOUS at 17:03

## 2019-03-28 RX ADMIN — FENTANYL CITRATE 50 MCG: 50 INJECTION, SOLUTION INTRAMUSCULAR; INTRAVENOUS at 15:13

## 2019-03-28 NOTE — PROGRESS NOTES
61year old with chronic mastoiditis right ear, perforation, for right tympanomastoidectomy, cartilage graft, tube. Risks/benefits/imponderables/alternatives discussed with patient. Patient requests surgery.

## 2019-03-28 NOTE — ANESTHESIA PREPROCEDURE EVALUATION
Relevant Problems   No relevant active problems       Anesthetic History   No history of anesthetic complications            Review of Systems / Medical History  Patient summary reviewed, nursing notes reviewed and pertinent labs reviewed    Pulmonary    COPD               Neuro/Psych         Psychiatric history     Cardiovascular            Dysrhythmias         Comments: WPW   GI/Hepatic/Renal  Within defined limits              Endo/Other      Hypothyroidism       Other Findings            Physical Exam    Airway  Mallampati: II  TM Distance: > 6 cm  Neck ROM: normal range of motion   Mouth opening: Normal     Cardiovascular  Regular rate and rhythm,  S1 and S2 normal,  no murmur, click, rub, or gallop             Dental  No notable dental hx       Pulmonary  Breath sounds clear to auscultation               Abdominal  GI exam deferred       Other Findings            Anesthetic Plan    ASA: 3  Anesthesia type: general          Induction: Intravenous  Anesthetic plan and risks discussed with: Patient
persistent URI symptoms, including nasal congestion, nasal crusting, occasional cough, CXR for PNA, treat symptomatically with neb/benadryl, reassess.

## 2019-03-28 NOTE — ANESTHESIA POSTPROCEDURE EVALUATION
Procedure(s):  RIGHT TYMPANOMASTOIDECTOMY, GRAFT EAR CARTILAGE, RIGHT MYRINGOTOMY / TYMPANOSTOMY TUBES PLACEMENT OF FACAIAL NERVE MONITORING ELECTRODES. general    Anesthesia Post Evaluation        Patient location during evaluation: PACU  Note status: Adequate. Level of consciousness: responsive to verbal stimuli and sleepy but conscious  Pain management: satisfactory to patient  Airway patency: patent  Anesthetic complications: no  Cardiovascular status: acceptable  Respiratory status: acceptable  Hydration status: acceptable  Comments: +Post-Anesthesia Evaluation and Assessment    Patient: Quinten Zuniga MRN: [id number]  SSN: [id number]   YOB: 1960  Age: 61 y.o. Sex: female          Cardiovascular Function/Vital Signs    /80   Pulse 95   Temp 36.5 °C (97.7 °F)   Resp 16   Ht 5' 6\" (1.676 m)   Wt 49.9 kg (110 lb)   SpO2 100%   BMI 17.75 kg/m²     Patient is status post Procedure(s):  RIGHT TYMPANOMASTOIDECTOMY, GRAFT EAR CARTILAGE, RIGHT MYRINGOTOMY / TYMPANOSTOMY TUBES PLACEMENT OF FACAIAL NERVE MONITORING ELECTRODES. Nausea/Vomiting: Controlled. Postoperative hydration reviewed and adequate. Pain:  Pain Scale 1: Adult Nonverbal Pain Scale (03/28/19 1830)  Pain Intensity 1: 6 (03/28/19 1820)   Managed. Neurological Status:   Neuro (WDL): Exceptions to WDL(hx of tremors. stuttering) (03/28/19 1820)   At baseline. Mental Status and Level of Consciousness: Arousable. Pulmonary Status:   O2 Device: Room air (03/28/19 1835)   Adequate oxygenation and airway patent. Complications related to anesthesia: None    Post-anesthesia assessment completed. No concerns. I have evaluated the patient and the patient is stable and ready to be discharged from PACU .     Signed By: Jatinder Meyer MD    3/28/2019        Vitals Value Taken Time   /80 3/28/2019  6:30 PM   Temp 36.5 °C (97.7 °F) 3/28/2019  6:20 PM   Pulse 96 3/28/2019  6:44 PM   Resp 15 3/28/2019  6:44 PM   SpO2 100 % 3/28/2019  6:40 PM   Vitals shown include unvalidated device data.

## 2019-03-28 NOTE — OP NOTES
OPERATIVE NOTE    Date of Procedure: 3/28/2019   Preoperative Diagnosis: NON SMOKER  OTHER SPECIFIED GENERAL MEDICAL EXAMINATIONS   CHRONIC MASTOIDITIS OF RIGHT SIDE   EAR DRUM PERFORATION, RIGHT  Postoperative Diagnosis: NON SMOKER  OTHER SPECIFIED GENERA    Procedure(s):  RIGHT TYMPANOMASTOIDECTOMY, GRAFT EAR CARTILAGE, RIGHT MYRINGOTOMY / TYMPANOSTOMY TUBES PLACEMENT OF FACAIAL NERVE MONITORING ELECTRODES  Surgeon(s) and Role:     * Dmaian Sherman MD - Primary  After informed consent and all questions answered, the patient was taken to the operating room and underwent general anesthesia and was intubated by anesthesia. The patient was prepped and draped in a sterile manner. Facial nerve monitoring was used throughout the case, and the facial nerve function was intact at the conclusion of the case. Attention was directed to the right ear. A four quadrant and periauricular injection with marcaine with epinephrine was performed. The microscope was draped in a sterile manner and was used throughout the procedure. The ear canal was visualized, and the perforation was freshened with a Crocker needle and a cup forceps. An incision was made in the mid- bony canal, and skin flaps were developed. The anulus was carefully raised, with care to preserve the Chorda tympani. The ossicles were visualized and palpated for assessment of mobility. Scar tissue was removed from the middle ear space. An incision was made along the anterior christine of the helix and the auricle was rotated posteriorly. Using cutting and loraine burs, a complete mastoidectomy was performed. Dissection was carried to the mastoid tip and sinodural angle. Chronic inflamed tissue was removed from the mastoid and sent for permanent section. The connection was reestablished from middle ear and mastoid. Gelfoam was placed in the mastoid bowl.   Through a second incision in the conchal bowl a cartilage/perichondrial graft was harvested for repair of the ear drum and reinforcement of the ear bones. This graft was thinned and used in an underlay technique. Vonita Rides was placed in the middle ear followed by the grafting material.  Skin flaps were placed lateral to the repair. Vonita Rides was placed in the ear canal to reinforce the repair. Anteriorly, an incision was made in the tympanic membrane and a modified t tube was placed. Incisions were closed in a multilayer fashion, with 5-O Vicryl for the deeper layers and 6-O plain for the skin. A kim coated  Mericel wojciech pack was shaped and placed in the ear canal. This was sutured in place with a 5-O silk suture. Floxin drops were placed on the packing material and Bacitracin was applied to the suture lines. The patient was allowed to awaken from anesthesia, was extubated, and stable to the recovery room. Surgical Assistant: none    Surgical Staff:  Circ-1: Bart Cesar RN  Scrub RN-1: Loki Teixeira RN  Event Time In Time Out   Incision Start 1541    Incision Close 1739      Anesthesia: General   Estimated Blood Loss: 5cc  Specimens:   ID Type Source Tests Collected by Time Destination   1 : right middle ear and mastoid contents Fresh Ear, Right  Brittney Richard MD 3/28/2019 1608 Pathology      Findings: chronic mastoiditis, scar tissue, perforation  Complications: none  Implants:   Implant Name Type Inv.  Item Serial No.  Lot No. LRB No. Used Action   t-tube dr Dang Wagoner   NA  [id number] Right 1 Implanted

## 2019-03-28 NOTE — BRIEF OP NOTE
BRIEF OPERATIVE NOTE    Date of Procedure: 3/28/2019   Preoperative Diagnosis: NON SMOKER  OTHER SPECIFIED GENERAL MEDICAL EXAMINATIONS   CHRONIC MASTOIDITIS OF RIGHT SIDE   EAR DRUM PERFORATION, RIGHT  Postoperative Diagnosis: NON SMOKER  OTHER SPECIFIED GENERA    Procedure(s):  RIGHT TYMPANOMASTOIDECTOMY, GRAFT EAR CARTILAGE, RIGHT MYRINGOTOMY / TYMPANOSTOMY TUBES PLACEMENT OF FACAIAL NERVE MONITORING ELECTRODES  Surgeon(s) and Role:     * Javier Greenfield MD - Primary  After informed consent and all questions answered, the patient was taken to the operating room and underwent general anesthesia and was intubated by anesthesia. The patient was prepped and draped in a sterile manner. Facial nerve monitoring was used throughout the case, and the facial nerve function was intact at the conclusion of the case. Attention was directed to the right ear. A four quadrant and periauricular injection with marcaine with epinephrine was performed. The microscope was draped in a sterile manner and was used throughout the procedure. The ear canal was visualized, and the perforation was freshened with a Crocker needle and a cup forceps. An incision was made in the mid- bony canal, and skin flaps were developed. The anulus was carefully raised, with care to preserve the Chorda tympani. The ossicles were visualized and palpated for assessment of mobility. Scar tissue was removed from the middle ear space. An incision was made along the anterior christine of the helix and the auricle was rotated posteriorly. Using cutting and loraine burs, a complete mastoidectomy was performed. Dissection was carried to the mastoid tip and sinodural angle. Chronic inflamed tissue was removed from the mastoid and sent for permanent section. The connection was reestablished from middle ear and mastoid. Gelfoam was placed in the mastoid bowl.   Through a second incision in the conchal bowl a cartilage/perichondrial graft was harvested for repair of the ear drum and reinforcement of the ear bones. This graft was thinned and used in an underlay technique. Kalani Amado was placed in the middle ear followed by the grafting material.  Skin flaps were placed lateral to the repair. Kalani Amado was placed in the ear canal to reinforce the repair. Anteriorly, an incision was made in the tympanic membrane and a modified t tube was placed. Incisions were closed in a multilayer fashion, with 5-O Vicryl for the deeper layers and 6-O plain for the skin. A kim coated  Mericel wojciech pack was shaped and placed in the ear canal. This was sutured in place with a 5-O silk suture. Floxin drops were placed on the packing material and Bacitracin was applied to the suture lines. The patient was allowed to awaken from anesthesia, was extubated, and stable to the recovery room. Surgical Assistant: none    Surgical Staff:  Circ-1: Pablito Baptiste RN  Scrub RN-1: Taylor Webster RN  Event Time In Time Out   Incision Start 1541    Incision Close 1739      Anesthesia: General   Estimated Blood Loss: 5cc  Specimens:   ID Type Source Tests Collected by Time Destination   1 : right middle ear and mastoid contents Fresh Ear, Right  Maximiliano Elizabeth MD 3/28/2019 1608 Pathology      Findings: chronic mastoiditis, scar tissue, perforation  Complications: none  Implants:   Implant Name Type Inv.  Item Serial No.  Lot No. LRB No. Used Action   t-tube dr Patricia Dakins   NA  [id number] Right 1 Implanted

## 2019-03-28 NOTE — DISCHARGE INSTRUCTIONS
[facility]    Ear Surgery Post Operative Instructions    1. DIET  Start a soft diet and progress to usual diet as tolerated, unless otherwise directed. It is important to remember that good overall diet and health promotes healing. 2.  ACTIVITY  Your activities should be limited as follows until your doctor gives you permission:  A. Avoid lifting heavy objects and any high impact activities  B. Do not blow your nose  C. Do not allow water to enter your ear*  D. Do not drive a vehicle or travel long distances (especially to areas of altitude)  E. Do not travel by plane  *To wash your hair without getting water in your ear, we recommend having a  or friend wash it over a sink. If water does get into the ear, we recommend using a hair dryer to dry the ear out. 3. WOUND CARE  A. You may have a Cheng dressing (plastic cup with Velcro straps) that covers the ear. B. Drainage is expected, so the dressing will probably be bloodied. Дмитрий Pickarden the Cheng dressing on till the first postop visit. D. The ear canal will have a sponge type packing that has been stitched right inside the opening of the canal.  This will be removed at your first follow-up appointment. E. If the sponge falls out before your follow-up appointment, do not put it back into the ear canal.  F. Your first follow-up appointment will be about 2 weeks after surgery. 4.  THINGS TO BE CONCERNED ABOUT  Please call the office for any of these changes  A. Continuous bleeding from the ear after the Grundy dressing is removed  B. Fever of 101 or  higher  C. Pain that doesnt respond to medication  D. Severe dizziness or dizziness that persists after the two weeks from surgery  E. Nausea or vomiting    Office Phone:  [phone] [employer] office hours are 8:00 a.m. to 4:30 p.m. You should be able to reach us after hours by calling the regular office number.   If for some reason you are not able to reach our [address] [place] [address] service through this main number you may call them directly at [phone].  ______________________________________________________________________    Anesthesia Discharge Instructions    After general anesthesia or intervenous sedation, for 24 hours or while taking prescription Narcotics:  · Limit your activities  · Do not drive or operate hazardous machinery  · If you have not urinated within 8 hours after discharge, please contact your surgeon on call. · Do not make important personal or business decisions  · Do not drink alcoholic beverages    Report the following to your surgeon:  · Excessive pain, swelling, redness or odor of or around the surgical area  · Temperature over 100.5 degrees  · Nausea and vomiting lasting longer than 4 hours or if unable to take medication  · Any signs of decreased circulation or nerve impairment to extremity:  Change in color, persistent numbness, tingling, coldness or increased pain.   · Any questions

## 2019-04-25 NOTE — PROGRESS NOTES
CC: back pain, headaches, tremor, depression, anxiety    History of Present Illness:  Valdemar Lee is a 61 y.o. female. Was last seen on 3/22/19 at which time she was referred to PT for bilateral low back pain with left-sided sciatica. Pain is unchanged. Has PT scheduled to start in May. She was also referred to psychiatry for her depression at last visit. She has not yet seen anyone. Since last visit, her depression, is doing ok except she does still have intermittent panic attacks for which she takes valium 5mg bid. Requests a refill of valium.  shows last refill was 4/11/19 for 60 tabs for 30 days. Has tried several SSRIs and Effexor in the past with side effects. She did call to schedule therapy appt and they told her to all back in May for appointment with Lori Lacey. She denies suicidal and homicidal ideations. Had tried remeron but stopped it due to a nightmare. Had been on Chantix at the same time which can also cause this. Now that she is off Chantix she says she is going to try the remeron again. Of note, she has not smoked in almost 7 months. Has chronic headaches (thought to be in part due to chronic migraines without aura and due to muscle tension headaches) and tremor of voice (that started after death of her brother) followed by neurology. Takes gabapentin and rizatriptan and flexeril. Requests a refill. Next appt with neurology is in June. Takes prn nitroglycerin for chest pain. Took one three weeks ago for palpitations related to anxiety. Denies chest pain, shortness of breath. Has 28 tabs left. Has appointment in June to see endocrinology for osteoporosis since she has allergy to alendronate.      Allergies   Allergen Reactions    Bacitracin Swelling    Bactrim [Sulfamethoprim Ds] Rash    Ciprofloxacin Swelling    Fosamax [Alendronate] Nausea Only    Imitrex [Sumatriptan Succinate] Other (comments)     Suicidal ideation    Keflex [Cephalexin] Nausea and Vomiting    Pcn [Penicillins] Swelling    Primidone Other (comments)     Headache, nightmares    Trazodone Other (comments)     Nightmares     Current Outpatient Medications   Medication Sig Dispense Refill    rizatriptan (MAXALT) 5 mg tablet   2    albuterol (PROVENTIL HFA) 90 mcg/actuation inhaler Take 1 Puff by inhalation every six (6) hours as needed for Wheezing or Shortness of Breath. 1 Inhaler 1    gabapentin (NEURONTIN) 600 mg tablet Take HALF tablet twice a day for 2 weeks, then increase to one tablet twice a day. To reduce tremors and headache frequency. 49 Tab 0    atenolol (TENORMIN) 25 mg tablet Take 25 mg by mouth every morning.  diazePAM (VALIUM) 5 mg tablet Take 1 Tab by mouth every twelve (12) hours as needed for Anxiety. Max Daily Amount: 10 mg. Not to fill before 4/1/19 60 Tab 0    cyclobenzaprine (FLEXERIL) 10 mg tablet TAKE ONE TABLET BY MOUTH ONCE DAILY AS NEEDED FOR MUSCLE TENSION HEADACHE (Patient taking differently: nightly as needed. TAKE ONE TABLET BY MOUTH ONCE DAILY AS NEEDED FOR MUSCLE TENSION HEADACHE) 30 Tab 2    nitroglycerin (NITROSTAT) 0.4 mg SL tablet 1 Tab by SubLINGual route every five (5) minutes as needed for Chest Pain. For 2 doses. If cp is unrelieved after second dose call 911. 1 Bottle 1    ergocalciferol (ERGOCALCIFEROL) 50,000 unit capsule Take 1 Cap by mouth every seven (7) days. For 8 weeks and then once monthly.  12 Cap 3     Patient Active Problem List   Diagnosis Code    Tympanic membrane rupture H72.90    Long Q-T syndrome I45.81    WPW (Fazal-Parkinson-White syndrome) I45.6    Panic disorder with agoraphobia F40.01    Generalized anxiety disorder F41.1    Migraine G43.909    Hyperlipidemia E78.5    Osteoporosis M81.0    Chest pain R07.9    Palpitations R00.2    Acquired hypothyroidism E03.9    Chronic mastoiditis, right ear H70.11       Past Medical History:   Diagnosis Date    Agoraphobia     Anxiety     CAD (coronary artery disease)     Chronic obstructive pulmonary disease (HCC)     Claustrophobia     History of mammography, screening     Normal    Ill-defined condition     WPW    Ill-defined condition     speech impairment    Pap smear for cervical cancer screening several years    Psychiatric disorder     depression, anxiety    Stuttering     Tremors of nervous system     Fazal-Parkinson-White (WPW) pattern      Past Surgical History:   Procedure Laterality Date    HX HYSTERECTOMY       Social History     Socioeconomic History    Marital status:      Spouse name: Not on file    Number of children: Not on file    Years of education: Not on file    Highest education level: Not on file   Tobacco Use    Smoking status: Former Smoker     Packs/day: 1.00     Years: 25.00     Pack years: 25.00     Types: Cigarettes     Last attempt to quit: 2018     Years since quittin.3    Smokeless tobacco: Never Used    Tobacco comment: 8 cig a day    Substance and Sexual Activity    Alcohol use: No     Alcohol/week: 0.0 oz    Drug use: Yes     Types: Marijuana    Sexual activity: Yes     Family History   Problem Relation Age of Onset    Cancer Father         lung,  age 58    Alzheimer Father     Breast Cancer Paternal Aunt     Hypertension Mother     Alzheimer Mother     Osteoporosis Mother     COPD Mother     Arthritis-osteo Mother     Downs Syndrome Brother     No Known Problems Son     Obesity Daughter     Depression Daughter     Anesth Problems Neg Hx          Review of Systems   Constitutional: Negative for chills and fever. Respiratory: Negative for shortness of breath. Cardiovascular: Negative for chest pain and palpitations. Musculoskeletal: Positive for back pain. Neurological: Positive for headaches. Negative for dizziness. Psychiatric/Behavioral: Positive for depression. Negative for suicidal ideas. The patient is nervous/anxious and has insomnia.         Physical Exam:  Visit Vitals  /71 Pulse 87   Temp 98.7 °F (37.1 °C) (Oral)   Resp 16   Ht 5' 6\" (1.676 m)   Wt 119 lb (54 kg)   SpO2 99%   BMI 19.21 kg/m²     Physical Exam   Constitutional: She is oriented to person, place, and time. No distress. Cardiovascular: Normal rate, regular rhythm and normal heart sounds. Pulmonary/Chest: Effort normal and breath sounds normal.   Musculoskeletal: She exhibits no edema. Neurological: She is alert and oriented to person, place, and time. stuttering   Psychiatric: She has a normal mood and affect. Her behavior is normal.           Assessment/Plan:  1. Muscle tension headache  Has PT scheduled for May for this and back pain. Will refill flexeril for now. Also followed by neurology. - cyclobenzaprine (FLEXERIL) 10 mg tablet; TAKE ONE TABLET BY MOUTH ONCE DAILY AS NEEDED FOR MUSCLE TENSION HEADACHE  Dispense: 30 Tab; Refill: 0    2. Anxiety  Goal is to wean her off valium. Will go to 1/2 tab in the morning and 1 tab at night of the 5mg tabs.  reviewed today. Dr. Nikky Reynolds prescribed Valium as below. She is going to retry remeron. She will call in May to make appt with psychology. - diazePAM (VALIUM) 5 mg tablet; Take 1/2 to 1 tablet twice daily AS NEEDED for anxiety. Not to fill before 5/1/19  Dispense: 60 Tab; Refill: 0    3. Migraine without status migrainosus, not intractable, unspecified migraine type  Refill of maxalt. Followed by neurology as well. - rizatriptan (MAXALT) 5 mg tablet; Take 1 Tab by mouth once as needed for Migraine for up to 1 dose. Dispense: 30 Tab; Refill: 1    4. Tremor  Followed by neurology. Refill atenolol. She will call neurology for refill of gabapentin. - atenolol (TENORMIN) 25 mg tablet; Take 1 Tab by mouth every morning. Take 1 tab in the morning and 1/2 Tab in the evening  Dispense: 45 Tab; Refill: 1    5. Chronic bilateral low back pain with left-sided sciatica  She has PT scheduled for next month.      Follow-up and Dispositions    · Return in about 8 weeks (around 6/21/2019). Diagnoses, tests, plan, and follow up discussed with patient. I have given to and reviewed with the patient the after visit summary. I have reviewed medication side effects and precautions. Patient expressed agreement and understanding. All questions were answered. Discussed with Dr. Saurabh Olvera.

## 2019-04-26 ENCOUNTER — OFFICE VISIT (OUTPATIENT)
Dept: FAMILY MEDICINE CLINIC | Age: 59
End: 2019-04-26

## 2019-04-26 VITALS
WEIGHT: 119 LBS | DIASTOLIC BLOOD PRESSURE: 71 MMHG | TEMPERATURE: 98.7 F | RESPIRATION RATE: 16 BRPM | SYSTOLIC BLOOD PRESSURE: 109 MMHG | BODY MASS INDEX: 19.13 KG/M2 | HEIGHT: 66 IN | HEART RATE: 87 BPM | OXYGEN SATURATION: 99 %

## 2019-04-26 DIAGNOSIS — G43.909 MIGRAINE WITHOUT STATUS MIGRAINOSUS, NOT INTRACTABLE, UNSPECIFIED MIGRAINE TYPE: ICD-10-CM

## 2019-04-26 DIAGNOSIS — F41.9 ANXIETY: ICD-10-CM

## 2019-04-26 DIAGNOSIS — R25.1 TREMOR: ICD-10-CM

## 2019-04-26 DIAGNOSIS — M54.42 CHRONIC BILATERAL LOW BACK PAIN WITH LEFT-SIDED SCIATICA: ICD-10-CM

## 2019-04-26 DIAGNOSIS — G44.209 MUSCLE TENSION HEADACHE: Primary | ICD-10-CM

## 2019-04-26 DIAGNOSIS — G89.29 CHRONIC BILATERAL LOW BACK PAIN WITH LEFT-SIDED SCIATICA: ICD-10-CM

## 2019-04-26 RX ORDER — ATENOLOL 25 MG/1
25 TABLET ORAL
Qty: 45 TAB | Refills: 1 | Status: SHIPPED | OUTPATIENT
Start: 2019-04-26 | End: 2019-10-16

## 2019-04-26 RX ORDER — DIAZEPAM 5 MG/1
TABLET ORAL
Qty: 60 TAB | Refills: 0 | Status: SHIPPED | OUTPATIENT
Start: 2019-04-26 | End: 2019-06-12 | Stop reason: SDUPTHER

## 2019-04-26 RX ORDER — CYCLOBENZAPRINE HCL 10 MG
TABLET ORAL
Qty: 30 TAB | Refills: 0 | Status: SHIPPED | OUTPATIENT
Start: 2019-04-26 | End: 2019-10-16 | Stop reason: SDUPTHER

## 2019-04-26 RX ORDER — RIZATRIPTAN BENZOATE 5 MG/1
5 TABLET ORAL
Qty: 30 TAB | Refills: 1 | Status: SHIPPED | OUTPATIENT
Start: 2019-04-26 | End: 2019-04-26

## 2019-04-26 NOTE — PROGRESS NOTES
Chief Complaint   Patient presents with    Medication Refill     NEEDS REFILLS ON ALL MEDICATIONS - RIZATRIPTAN, CYCLOBENZAPR, NITROGLYCERIN, GABAPENTIN, DIAZEPAM, ATENOLOL        Health Maintenance Due   Topic    Hepatitis C Screening     Shingrix Vaccine Age 50> (1 of 2)    FOBT Q 1 YEAR AGE 50-75        Wt Readings from Last 3 Encounters:   04/26/19 119 lb (54 kg)   03/28/19 110 lb (49.9 kg)   03/22/19 115 lb (52.2 kg)     Temp Readings from Last 3 Encounters:   04/26/19 98.7 °F (37.1 °C) (Oral)   03/28/19 97.7 °F (36.5 °C)   03/22/19 98.3 °F (36.8 °C)     BP Readings from Last 3 Encounters:   04/26/19 109/71   03/28/19 125/80   03/22/19 138/87     Pulse Readings from Last 3 Encounters:   04/26/19 87   03/28/19 95   03/22/19 93         Learning Assessment:  :     Learning Assessment 7/15/2015 3/14/2014   PRIMARY LEARNER Patient Patient   BARRIERS PRIMARY LEARNER NONE -   PRIMARY LANGUAGE ENGLISH ENGLISH   LEARNER PREFERENCE PRIMARY LISTENING READING   ANSWERED BY patient patient   RELATIONSHIP SELF SELF       Depression Screening:  :     3 most recent PHQ Screens 4/26/2019   PHQ Not Done -   Little interest or pleasure in doing things Nearly every day   Feeling down, depressed, irritable, or hopeless Nearly every day   Total Score PHQ 2 6   Trouble falling or staying asleep, or sleeping too much More than half the days   Feeling tired or having little energy Several days   Poor appetite, weight loss, or overeating Nearly every day   Feeling bad about yourself - or that you are a failure or have let yourself or your family down Nearly every day   Trouble concentrating on things such as school, work, reading, or watching TV Several days   Moving or speaking so slowly that other people could have noticed; or the opposite being so fidgety that others notice Nearly every day   Thoughts of being better off dead, or hurting yourself in some way Several days   PHQ 9 Score 20   How difficult have these problems made it for you to do your work, take care of your home and get along with others Somewhat difficult       Fall Risk Assessment:  :     No flowsheet data found. Abuse Screening:  :     No flowsheet data found. Coordination of Care Questionnaire:  :     1) Have you been to an emergency room, urgent care clinic since your last visit? NO  Hospitalized since your last visit? NO             2) Have you seen or consulted any other health care providers outside of [address] [place] [address] since your last visit? NO    Patient is accompanied by spouse I have received verbal consent from Domenica Dior to discuss any/all medical information while they are present in the room.

## 2019-04-26 NOTE — PATIENT INSTRUCTIONS
Cyclobenzaprine (Flexeril, Amrix, Fexmid, FusePaq Tabradol) - (By mouth)   Why this medicine is used:   Treats pain and stiffness caused by muscle spasms. Contact a nurse or doctor right away if you have:  · Anxiety, restlessness, twitching  · Seeing or hearing things that are not there  · Fast, pounding, or uneven heartbeat  · Fever, sweating, nausea, vomiting, diarrhea     Common side effects:  · Dry mouth  · Dizziness, drowsiness  © 2017 [facility] Information is for End User's use only and may not be sold, redistributed or otherwise used for commercial purposes. Learning About Positive Thinking  What is positive thinking? Positive thinking, or healthy thinking, is a way to help you stay well or cope with a health problem by changing how you think. It's based on research that shows that you can change how you think. And how you think affects how you feel. Cognitive-behavioral therapy, also called CBT, is a therapy that is often used to help people think in a healthy way. It focuses on thought (cognitive) and action (behavioral). How can positive thinking help you? If you think in a positive way, you may be more able to care for yourself and handle life's challenges. You will feel better. And you may be more able to avoid or cope with stress, anxiety, and depression. CBT may be able to help you sleep better and lose weight. How can you get started with positive thinking? CBT involves techniques that you can practice every day so that healthy thinking comes naturally. Here are the steps for one technique. 1. Stop. When you notice a negative thought, stop it in its tracks and write it down. 2. Ask. Look at that thought and ask yourself whether it is helpful or unhelpful right now. 3. Choose. Choose a new, helpful thought to replace a negative one. Here's an example of how this might work:  · In a job review, your boss praised several things about your work.  But you're feeling down because she had one small criticism. You might even think, \"I'm no good at my job\" or \"She doesn't like me. I must be bad. \" These are negative thoughts. You want to stop them. · Ask yourself questions about the situation and your negative thoughts. You might ask, \"What did my boss say exactly? \" \"Were there positive comments? \" \"Why do I focus only on one criticism? \" Your answers can help you find more accurate and helpful statements. · Now choose a helpful thought to replace the negative thoughts. For example, you might think, \"I've done a lot of good work this year, and my boss noticed it. She thought there was one area I can improve. So I'll think of some things I can do to get stronger in that area. \"  With time and practice, you can learn to see that the harsh things you say to yourself may keep you from enjoying your life and work. You can replace them with more helpful thoughts. Where can you learn more? Go to http://vinita-jade.info/. Enter F163 in the search box to learn more about \"Learning About Positive Thinking. \"  Current as of: September 11, 2018  Content Version: 11.9  © 8725-7313 [employer]. Care instructions adapted under license by [employer] (which disclaims liability or warranty for this information). If you have questions about a medical condition or this instruction, always ask your healthcare professional. [place] [address] any warranty or liability for your use of this information.

## 2019-05-29 NOTE — PROGRESS NOTES
CC: anxiety    History of Present Illness:  Neela Luo is a 61 y.o. female. Was last seen 4/26/19. Since then she has started PT for muscle tension headache and back pain. She thinks it is helping some. She is also followed by neurology for headaches and tremor and has appointment on 6/18/19. She notes her headaches and tremor are unchanged    She has anxiety treated with valium. Goal is to wean her off valium. At last visit dose was decreased to valium 5mg 1/2 tab in the morning and 1 tab at night prn; however, she has been taking it 5mg in the morning and 5mg in the evening. She thinks this is not enough because around 4 or 5pm she begins to feel anxious. She would like to take the second valium dose in the afternoon instead and retry the remeron in the evening at bedtime. She was going to retry remeron, but has yet to do so. She did not yet made appointment with psychology, but says she is trying to schedule with a practice in [place] through her insurance. She notes left wrist pain for several weeks. Denies trauma or injury to the area. Denies weakness, numbness, tingling. Denies fevers, chills, redness to the area, warmth to the area. Allergies   Allergen Reactions    Bacitracin Swelling    Bactrim [Sulfamethoprim Ds] Rash    Ciprofloxacin Swelling    Fosamax [Alendronate] Nausea Only    Imitrex [Sumatriptan Succinate] Other (comments)     Suicidal ideation    Keflex [Cephalexin] Nausea and Vomiting    Pcn [Penicillins] Swelling    Primidone Other (comments)     Headache, nightmares    Trazodone Other (comments)     Nightmares     Current Outpatient Medications   Medication Sig Dispense Refill    ofloxacin (FLOXIN) 0.3 % otic solution   3    rizatriptan (MAXALT) 5 mg tablet Take 5 mg by mouth once as needed for Migraine.  May repeat in 2 hours if needed      cyclobenzaprine (FLEXERIL) 10 mg tablet TAKE ONE TABLET BY MOUTH ONCE DAILY AS NEEDED FOR MUSCLE TENSION HEADACHE 30 Tab 0    diazePAM (VALIUM) 5 mg tablet Take 1/2 to 1 tablet twice daily AS NEEDED for anxiety. Not to fill before 5/1/19 60 Tab 0    atenolol (TENORMIN) 25 mg tablet Take 1 Tab by mouth every morning. Take 1 tab in the morning and 1/2 Tab in the evening 45 Tab 1    albuterol (PROVENTIL HFA) 90 mcg/actuation inhaler Take 1 Puff by inhalation every six (6) hours as needed for Wheezing or Shortness of Breath. 1 Inhaler 1    gabapentin (NEURONTIN) 600 mg tablet Take HALF tablet twice a day for 2 weeks, then increase to one tablet twice a day. To reduce tremors and headache frequency. 49 Tab 0    nitroglycerin (NITROSTAT) 0.4 mg SL tablet 1 Tab by SubLINGual route every five (5) minutes as needed for Chest Pain. For 2 doses. If cp is unrelieved after second dose call 911. 1 Bottle 1    ergocalciferol (ERGOCALCIFEROL) 50,000 unit capsule Take 1 Cap by mouth every seven (7) days. For 8 weeks and then once monthly.  12 Cap 3     Patient Active Problem List   Diagnosis Code    Tympanic membrane rupture H72.90    Long Q-T syndrome I45.81    WPW (Fazal-Parkinson-White syndrome) I45.6    Panic disorder with agoraphobia F40.01    Generalized anxiety disorder F41.1    Migraine G43.909    Hyperlipidemia E78.5    Osteoporosis M81.0    Chest pain R07.9    Palpitations R00.2    Acquired hypothyroidism E03.9    Chronic mastoiditis, right ear H70.11       Past Medical History:   Diagnosis Date    Agoraphobia     Anxiety     CAD (coronary artery disease)     Chronic obstructive pulmonary disease (HCC)     Claustrophobia     History of mammography, screening 2012    Normal    Ill-defined condition     WPW    Ill-defined condition     speech impairment    Pap smear for cervical cancer screening several years    Psychiatric disorder     depression, anxiety    Stuttering     Tremors of nervous system     Fazal-Parkinson-White (WPW) pattern      Past Surgical History:   Procedure Laterality Date    HX HYSTERECTOMY Social History     Socioeconomic History    Marital status:      Spouse name: Not on file    Number of children: Not on file    Years of education: Not on file    Highest education level: Not on file   Tobacco Use    Smoking status: Former Smoker     Packs/day: 1.00     Years: 25.00     Pack years: 25.00     Types: Cigarettes     Last attempt to quit: 2018     Years since quittin.4    Smokeless tobacco: Never Used    Tobacco comment: 8 cig a day    Substance and Sexual Activity    Alcohol use: No     Alcohol/week: 0.0 oz    Drug use: Not Currently     Types: Marijuana    Sexual activity: Yes     Family History   Problem Relation Age of Onset    Cancer Father         lung,  age 58    Alzheimer Father     Breast Cancer Paternal Aunt     Hypertension Mother     Alzheimer Mother     Osteoporosis Mother     COPD Mother     Arthritis-osteo Mother     Downs Syndrome Brother     No Known Problems Son     Obesity Daughter     Depression Daughter     Anesth Problems Neg Hx          Review of Systems   Constitutional: Negative for chills and fever. Respiratory: Negative for shortness of breath. Cardiovascular: Negative for chest pain and leg swelling. Musculoskeletal: Positive for joint pain (left wrist). Neurological: Negative for dizziness and headaches. Psychiatric/Behavioral: Negative for depression and suicidal ideas. The patient is nervous/anxious. Physical Exam:  Visit Vitals  /89   Pulse 92   Temp 98.2 °F (36.8 °C) (Oral)   Resp 16   Ht 5' 6\" (1.676 m)   Wt 120 lb (54.4 kg)   SpO2 97%   BMI 19.37 kg/m²     Physical Exam   Constitutional: She is oriented to person, place, and time. She appears well-developed. No distress. Cardiovascular: Normal rate, regular rhythm and normal heart sounds. Pulmonary/Chest: Effort normal and breath sounds normal.   Musculoskeletal: She exhibits no edema.         Left wrist: She exhibits tenderness (superior wrist). She exhibits normal range of motion, no bony tenderness, no swelling and no effusion. Neurological: She is alert and oriented to person, place, and time. Psychiatric: She has a normal mood and affect. Assessment/Plan:  1. Anxiety  Uncontrolled on current medication regimen. Will try valium dose 5mg in the morning and 5mg 8 hours later and remeron dose at bedtime which for her is around 11pm. Does need to schedule psychology appointment which she is in the process of doing. Close follow up. 2. Muscle tension headache  Stable. Continue with PT.     3. Tremor  Stable. Followed by neurology. 4. Left wrist pain  Some tenderness over dorsal aspect. No reported injury or trauma. Check xray. - XR WRIST LT AP/LAT/OBL MIN 3V; Future          Follow-up and Dispositions    · Return in about 1 month (around 6/28/2019). Diagnoses, tests, plan, and follow up discussed with patient. I have given to and reviewed with the patient the after visit summary. I have reviewed medication side effects and precautions. Patient expressed agreement and understanding. All questions were answered. Discussed with Dr. Romeo Friedman.

## 2019-05-31 ENCOUNTER — OFFICE VISIT (OUTPATIENT)
Dept: FAMILY MEDICINE CLINIC | Age: 59
End: 2019-05-31

## 2019-05-31 ENCOUNTER — TELEPHONE (OUTPATIENT)
Dept: FAMILY MEDICINE CLINIC | Age: 59
End: 2019-05-31

## 2019-05-31 VITALS
RESPIRATION RATE: 16 BRPM | OXYGEN SATURATION: 97 % | TEMPERATURE: 98.2 F | HEIGHT: 66 IN | SYSTOLIC BLOOD PRESSURE: 132 MMHG | WEIGHT: 120 LBS | DIASTOLIC BLOOD PRESSURE: 89 MMHG | HEART RATE: 92 BPM | BODY MASS INDEX: 19.29 KG/M2

## 2019-05-31 DIAGNOSIS — R25.1 TREMOR: ICD-10-CM

## 2019-05-31 DIAGNOSIS — F41.9 ANXIETY: ICD-10-CM

## 2019-05-31 DIAGNOSIS — F41.9 ANXIETY: Primary | ICD-10-CM

## 2019-05-31 DIAGNOSIS — G44.209 MUSCLE TENSION HEADACHE: ICD-10-CM

## 2019-05-31 DIAGNOSIS — M25.532 LEFT WRIST PAIN: ICD-10-CM

## 2019-05-31 RX ORDER — RIZATRIPTAN BENZOATE 5 MG/1
5 TABLET ORAL
COMMUNITY
End: 2022-09-22

## 2019-05-31 RX ORDER — OFLOXACIN 3 MG/ML
SOLUTION AURICULAR (OTIC)
Refills: 3 | COMMUNITY
Start: 2019-05-01 | End: 2022-09-22

## 2019-05-31 NOTE — PATIENT INSTRUCTIONS

## 2019-05-31 NOTE — PROGRESS NOTES
Reyes Easley is a 61 y.o. female      Chief Complaint   Patient presents with    Follow-up     Medications         1. Have you been to the ER, urgent care clinic since your last visit? Hospitalized since your last visit? no      2. Have you seen or consulted any other health care providers outside of the [address] [place] [address] since your last visit? Include any pap smears or colon screening.   no

## 2019-05-31 NOTE — TELEPHONE ENCOUNTER
Patient had appt with Dr. Seda Hollis, patient is very concern that she would not be seeing you or Kelvin for future visit. I have set her up for her month appt for  July 11 with Dr. Delano Rahman ( this will be her 1st visit with her). She wanted to make sure this was going to be a good fit and you know her case. She mention that she uses to be a patient with the care [facility]. Please advises, patient is very anxious about the switch in physicians.

## 2019-06-04 NOTE — TELEPHONE ENCOUNTER
Patient is taking valium in 1 in am ( noted she tried to take 1/2 am and 1/2 mid day.)     Remeron at night    Did not get any refill at last appt.

## 2019-06-06 RX ORDER — MIRTAZAPINE 15 MG/1
15 TABLET, ORALLY DISINTEGRATING ORAL
Qty: 30 TAB | Refills: 0 | Status: SHIPPED | OUTPATIENT
Start: 2019-06-06 | End: 2019-06-12 | Stop reason: DRUGHIGH

## 2019-06-06 RX ORDER — DIAZEPAM 5 MG/1
TABLET ORAL
Qty: 60 TAB | Refills: 0 | OUTPATIENT
Start: 2019-06-06

## 2019-06-06 NOTE — TELEPHONE ENCOUNTER
Spoke with patient, advised of remeron sent to the pharmacy. Advised she will need an appointment for the valium, Earlier appointment scheduled for June 12th.

## 2019-06-06 NOTE — TELEPHONE ENCOUNTER
Patient filled valium on 5/18/2019 #60.  per . I will not fill prescription for benzo without seeing patient. Please see if patient can come in earlier for appointment and reminder her to bring bottle with to appointment.      Federico Bishop MD

## 2019-06-10 NOTE — TELEPHONE ENCOUNTER
I called and spoke with pt re: meds and recent updates    1) Anxiety/depression/insomnia: She says she is very pleased with how the remeron is doing, does help her calm down in evening and sleep well at night. No noticeable side effects. She thinks she may benefit from a higher dose and has tried taking 2 tabs nightly with better relief of anxiety/depression /insomnia. I encouraged her to discuss this dose increase with Dr. Ilan Maxwell, but sounds reasonable to me. Reports she is only needing to take one valium 5mg tab in the morning, none at night since starting remeron. Says somedays if she is feeling well and not anxious she won't even take that 1 am tab. 2) Headaches: She noticed side effects from gabapentin (nightmares) when she takes in 3 days in a row, so she stopped that. I told her perhaps could keep this on hand for prn use. She reports maxalt works well for her severe headasches. Is taking atenolol. 3) Back pain: Has been to a few PT sessions and thinks it is helping, says the therapist told her she may need surgery at some point, but pt plans to keep doing PT for now    4) PTSD: Is seeing a psychologist. Iza Ornelas me she got her disability approved and the psychiatrist who did her eval for this told her PTSD needed to be addressed d/t multiple family member deaths. She is trying to go out more with her  and cousin. Has doctor appts that get her out of the house too - has 4 appts in the next week! 5) Tobacco dependence: remains tobacco free since mid Nov 2018. Is very excited about this. Pt is grateful for the care she has received at [facility] even though the transition from [address] was hard. I reassured her she will be in good hands with Dr. Ilan Maxwell. Fwding to Dr. Ilan Maxwell who will see pt in 2 days for f/u.

## 2019-06-12 ENCOUNTER — OFFICE VISIT (OUTPATIENT)
Dept: FAMILY MEDICINE CLINIC | Age: 59
End: 2019-06-12

## 2019-06-12 VITALS
TEMPERATURE: 97.8 F | BODY MASS INDEX: 19.61 KG/M2 | SYSTOLIC BLOOD PRESSURE: 136 MMHG | WEIGHT: 122 LBS | HEART RATE: 91 BPM | HEIGHT: 66 IN | OXYGEN SATURATION: 96 % | RESPIRATION RATE: 16 BRPM | DIASTOLIC BLOOD PRESSURE: 88 MMHG

## 2019-06-12 DIAGNOSIS — M54.50 LUMBAR BACK PAIN: ICD-10-CM

## 2019-06-12 DIAGNOSIS — Z79.899 CHRONIC PRESCRIPTION BENZODIAZEPINE USE: ICD-10-CM

## 2019-06-12 DIAGNOSIS — F41.9 ANXIETY: Primary | ICD-10-CM

## 2019-06-12 RX ORDER — MIRTAZAPINE 30 MG/1
30 TABLET, FILM COATED ORAL
Qty: 30 TAB | Refills: 5 | Status: SHIPPED | OUTPATIENT
Start: 2019-06-12 | End: 2019-10-16

## 2019-06-12 RX ORDER — MIRTAZAPINE 15 MG/1
15 TABLET, ORALLY DISINTEGRATING ORAL
Qty: 30 TAB | Refills: 0 | Status: CANCELLED | OUTPATIENT
Start: 2019-06-12

## 2019-06-12 RX ORDER — DIAZEPAM 5 MG/1
TABLET ORAL
Qty: 60 TAB | Refills: 0 | Status: SHIPPED | OUTPATIENT
Start: 2019-06-12 | End: 2019-10-16 | Stop reason: ALTCHOICE

## 2019-06-12 NOTE — PROGRESS NOTES
1. Have you been to the ER, urgent care clinic, or been hospitalized since your last visit? No     2. Have you seen or consulted any other health care providers outside of the [address] [place] [address] since your last visit?   No     Reviewed record in preparation for visit and have necessary documentation  opportunity was given for questions  Goals that were addressed and/or need to be completed during or after this appointment include   Health Maintenance Due   Topic Date Due    Hepatitis C Screening  1960    Shingrix Vaccine Age 50> (1 of 2) 01/27/2010    FOBT Q 1 YEAR AGE 50-75  01/27/2010

## 2019-06-12 NOTE — PROGRESS NOTES
Gautam Frazier  61 y.o. female  1960  [address] [facility] [address]  [address] [place] [address] MEDICINE: Progress Note       Encounter Date: 6/12/2019    Chief Complaint   Patient presents with    Medication Refill    Medication Evaluation     discuss increasing remeron       History provided by patient  History of Present Illness   Gautam Frazier is a 61 y.o. female who presents to clinic today for:    Anxiety  Patient present with cc of anxiety. Patient has been weaning down on valium; currently taking 5 mg once a day. Patient reported that the Remeron is helping greatly but she is not sleeping completely thru the night. She has joined an online support group for caregivers of patient of Alzheimer's. Denies SI/HI. She goes out for a walk everyday. .Back Pain  Patient has history of chronic lower back pain. She is currently following with PT and reports that she gets relief, shot-term, from the TENs sessions but otherwise she has pain most of the time. Health Maintenance  VIIS queried and reviewed; updated in chart as appropriate. Review of Systems   Review of Systems   Constitutional: Negative for chills and fever. Cardiovascular: Negative for chest pain, palpitations and leg swelling. Genitourinary: Negative for dysuria, frequency, hematuria and urgency. Musculoskeletal: Positive for back pain. Negative for falls, joint pain and neck pain. Skin: Negative for itching and rash. Neurological: Positive for tremors. Psychiatric/Behavioral: Negative for substance abuse and suicidal ideas. The patient is nervous/anxious and has insomnia. Vitals/Objective:     Vitals:    06/12/19 1548   BP: 136/88   Pulse: 91   Resp: 16   Temp: 97.8 °F (36.6 °C)   TempSrc: Oral   SpO2: 96%   Weight: 122 lb (55.3 kg)   Height: 5' 6\" (1.676 m)     Body mass index is 19.69 kg/m².     Wt Readings from Last 3 Encounters:   06/12/19 122 lb (55.3 kg)   05/31/19 120 lb (54.4 kg)   04/26/19 119 lb (54 kg)       Physical Exam   Constitutional: She is oriented to person, place, and time. She appears well-developed. No distress. HENT:   Head: Atraumatic. Right Ear: External ear normal.   Left Ear: External ear normal.   Cardiovascular: Normal rate and regular rhythm. Pulmonary/Chest: Effort normal and breath sounds normal.   Neurological: She is alert and oriented to person, place, and time. Skin: She is not diaphoretic. No results found for this or any previous visit (from the past 24 hour(s)). Assessment and Plan:     Encounter Diagnoses     ICD-10-CM ICD-9-CM   1. Anxiety F41.9 300.00   2. Chronic prescription benzodiazepine use Z79.899 V58.69   3. Lumbar back pain M54.5 724.2       1. Anxiety  2. Chronic prescription benzodiazepine use  Patient is continuing to wean down on benzos. Finds that online support online is very helpful as she is able to discuss with people at anytime. - diazePAM (VALIUM) 5 mg tablet; Take 1/2 to 1 tablet twice daily AS NEEDED for anxiety. Not to fill before 5/1/19  Dispense: 60 Tab; Refill: 0  - mirtazapine (REMERON) 30 mg tablet; Take 1 Tab by mouth nightly. Dispense: 30 Tab; Refill: 5  - COMPLIANCE DRUG SCREEN/PRESCRIPTION MONITORING    3. Lumbar back pain  - REFERRAL TO PAIN MANAGEMENT      I have discussed the diagnosis with the patient and the intended plan as seen in the above orders. she has expressed understanding. The patient has received an after-visit summary and questions were answered concerning future plans. I have discussed medication side effects and warnings with the patient as well. Electronically Signed: Jake Rice MD     History/Allergies   Patients past medical, surgical and family histories were reviewed and updated.     Past Medical History:   Diagnosis Date    Agoraphobia     Anxiety     CAD (coronary artery disease)     Chronic obstructive pulmonary disease ([facility] [address] [facility]     Claustrophobia     History of mammography, screening     Normal    Ill-defined condition     WPW    Ill-defined condition     speech impairment    Pap smear for cervical cancer screening several years    Psychiatric disorder     depression, anxiety    Stuttering     Tremors of nervous system     Fazal-Parkinson-White (WPW) pattern       Past Surgical History:   Procedure Laterality Date    HX HYSTERECTOMY       Family History   Problem Relation Age of Onset    Cancer Father         lung,  age 58    Alzheimer Father     Breast Cancer Paternal Aunt     Hypertension Mother     Alzheimer Mother     Osteoporosis Mother     COPD Mother     Arthritis-osteo Mother     Downs Syndrome Brother     No Known Problems Son     Obesity Daughter     Depression Daughter     Anesth Problems Neg Hx      Social History     Tobacco Use    Smoking status: Former Smoker     Packs/day: 1.00     Years: 25.00     Pack years: 25.00     Types: Cigarettes     Last attempt to quit: 2018     Years since quittin.4    Smokeless tobacco: Never Used    Tobacco comment: 8 cig a day    Substance Use Topics    Alcohol use: No     Alcohol/week: 0.0 oz    Drug use: Not Currently     Types: Marijuana          Allergies   Allergen Reactions    Bacitracin Swelling    Bactrim [Sulfamethoprim Ds] Rash    Ciprofloxacin Swelling    Fosamax [Alendronate] Nausea Only    Imitrex [Sumatriptan Succinate] Other (comments)     Suicidal ideation    Keflex [Cephalexin] Nausea and Vomiting    Pcn [Penicillins] Swelling    Primidone Other (comments)     Headache, nightmares    Trazodone Other (comments)     Nightmares       Disposition     Follow-up and Dispositions  ·   Return in about 1 month (around 7/10/2019) for Routine (Chronic Conditions).          Future Appointments   Date Time Provider Mary Rivasi   2019 10:50 AM Jessica Hernandez MD [facility] [address]   2019  3:00 PM Jc Paez MD [address] [place] [address]   2019 10:00 AM Lu Pancho Charlton MD [facility]            Current Medications after this visit     Current Outpatient Medications   Medication Sig    diazePAM (VALIUM) 5 mg tablet Take 1/2 to 1 tablet twice daily AS NEEDED for anxiety. Not to fill before 5/1/19    mirtazapine (REMERON) 30 mg tablet Take 1 Tab by mouth nightly.  ofloxacin (FLOXIN) 0.3 % otic solution     rizatriptan (MAXALT) 5 mg tablet Take 5 mg by mouth once as needed for Migraine. May repeat in 2 hours if needed    cyclobenzaprine (FLEXERIL) 10 mg tablet TAKE ONE TABLET BY MOUTH ONCE DAILY AS NEEDED FOR MUSCLE TENSION HEADACHE    atenolol (TENORMIN) 25 mg tablet Take 1 Tab by mouth every morning. Take 1 tab in the morning and 1/2 Tab in the evening    albuterol (PROVENTIL HFA) 90 mcg/actuation inhaler Take 1 Puff by inhalation every six (6) hours as needed for Wheezing or Shortness of Breath.  gabapentin (NEURONTIN) 600 mg tablet Take HALF tablet twice a day for 2 weeks, then increase to one tablet twice a day. To reduce tremors and headache frequency.  nitroglycerin (NITROSTAT) 0.4 mg SL tablet 1 Tab by SubLINGual route every five (5) minutes as needed for Chest Pain. For 2 doses. If cp is unrelieved after second dose call 911.  ergocalciferol (ERGOCALCIFEROL) 50,000 unit capsule Take 1 Cap by mouth every seven (7) days. For 8 weeks and then once monthly. No current facility-administered medications for this visit.       Medications Discontinued During This Encounter   Medication Reason    mirtazapine (REMERON SOL-TAB) 15 mg disintegrating tablet Dose Adjustment    diazePAM (VALIUM) 5 mg tablet Reorder

## 2019-06-12 NOTE — PATIENT INSTRUCTIONS
Back Pain: Care Instructions  Your Care Instructions    Back pain has many possible causes. It is often related to problems with muscles and ligaments of the back. It may also be related to problems with the nerves, discs, or bones of the back. Moving, lifting, standing, sitting, or sleeping in an awkward way can strain the back. Sometimes you don't notice the injury until later. Arthritis is another common cause of back pain. Although it may hurt a lot, back pain usually improves on its own within several weeks. Most people recover in 12 weeks or less. Using good home treatment and being careful not to stress your back can help you feel better sooner. Follow-up care is a key part of your treatment and safety. Be sure to make and go to all appointments, and call your doctor if you are having problems. It's also a good idea to know your test results and keep a list of the medicines you take. How can you care for yourself at home? · Sit or lie in positions that are most comfortable and reduce your pain. Try one of these positions when you lie down:  ? Lie on your back with your knees bent and supported by large pillows. ? Lie on the floor with your legs on the seat of a sofa or chair. ? Lie on your side with your knees and hips bent and a pillow between your legs. ? Lie on your stomach if it does not make pain worse. · Do not sit up in bed, and avoid soft couches and twisted positions. Bed rest can help relieve pain at first, but it delays healing. Avoid bed rest after the first day of back pain. · Change positions every 30 minutes. If you must sit for long periods of time, take breaks from sitting. Get up and walk around, or lie in a comfortable position. · Try using a heating pad on a low or medium setting for 15 to 20 minutes every 2 or 3 hours. Try a warm shower in place of one session with the heating pad. · You can also try an ice pack for 10 to 15 minutes every 2 to 3 hours.  Put a thin cloth between the ice pack and your skin. · Take pain medicines exactly as directed. ? If the doctor gave you a prescription medicine for pain, take it as prescribed. ? If you are not taking a prescription pain medicine, ask your doctor if you can take an over-the-counter medicine. · Take short walks several times a day. You can start with 5 to 10 minutes, 3 or 4 times a day, and work up to longer walks. Walk on level surfaces and avoid hills and stairs until your back is better. · Return to work and other activities as soon as you can. Continued rest without activity is usually not good for your back. · To prevent future back pain, do exercises to stretch and strengthen your back and stomach. Learn how to use good posture, safe lifting techniques, and proper body mechanics. When should you call for help? Call your doctor now or seek immediate medical care if:    · You have new or worsening numbness in your legs.     · You have new or worsening weakness in your legs. (This could make it hard to stand up.)     · You lose control of your bladder or bowels.    Watch closely for changes in your health, and be sure to contact your doctor if:    · You have a fever, lose weight, or don't feel well.     · You do not get better as expected. Where can you learn more? Go to http://vinita-jade.info/. Enter W801 in the search box to learn more about \"Back Pain: Care Instructions. \"  Current as of: September 20, 2018  Content Version: 11.9  © 1096-6048 [employer]. Care instructions adapted under license by [employer] (which disclaims liability or warranty for this information). If you have questions about a medical condition or this instruction, always ask your healthcare professional. [place] [address] any warranty or liability for your use of this information.

## 2019-06-13 ENCOUNTER — TELEPHONE (OUTPATIENT)
Dept: FAMILY MEDICINE CLINIC | Age: 59
End: 2019-06-13

## 2019-06-13 NOTE — TELEPHONE ENCOUNTER
It appears that the pain management office we discussed yesterday does not accept patient's insurance. Please let the patient know and ask that she call her insurance and request a list on in-network providers.      Cosme Rodriguez MD

## 2019-06-14 ENCOUNTER — OFFICE VISIT (OUTPATIENT)
Dept: ENDOCRINOLOGY | Age: 59
End: 2019-06-14

## 2019-06-14 VITALS
HEART RATE: 102 BPM | SYSTOLIC BLOOD PRESSURE: 138 MMHG | DIASTOLIC BLOOD PRESSURE: 96 MMHG | BODY MASS INDEX: 19.25 KG/M2 | HEIGHT: 66 IN | WEIGHT: 119.8 LBS

## 2019-06-14 DIAGNOSIS — M81.8 OTHER OSTEOPOROSIS WITHOUT CURRENT PATHOLOGICAL FRACTURE: Primary | ICD-10-CM

## 2019-06-14 DIAGNOSIS — E89.40 PREMATURE SURGICAL MENOPAUSE: ICD-10-CM

## 2019-06-14 DIAGNOSIS — E55.9 VITAMIN D DEFICIENCY: ICD-10-CM

## 2019-06-14 NOTE — PROGRESS NOTES
Chief Complaint   Patient presents with    New Patient     PCP and pharmacy confirmed     Osteoporosis     History of Present Illness: Avani Augustin is a 61 y.o. female presents for evaluation of osteoporosis. No fractures in the past  Took Fosamax for a brief period several years ago, but had nausea and stopped. She had a total hysterectomy at age 22. Took some estrogen for several years, then stopped, because she didn't feel it was doing anything for her. Family hx - mother had scoliosis and osteoporosis. Mother had arm fractures/wrist factures. She has lactose intolerance, so avoids milk. Had lost a lot of weight loss while caring for her brother who had Downs Syndrome. She found him dead and then the following morning she started having a stuttering problem, which has persisted. Social:  Quit smoking about 9 months ago. She had smoked for about 25 years. Past Medical History:   Diagnosis Date    Agoraphobia     Anxiety     CAD (coronary artery disease)     Chronic obstructive pulmonary disease ([facility] [address]     Claustrophobia     History of mammography, screening 2012    Normal    Ill-defined condition     WPW    Ill-defined condition     speech impairment    Pap smear for cervical cancer screening several years    Psychiatric disorder     depression, anxiety    Stuttering     Tremors of nervous system     Fazal-Parkinson-White (WPW) pattern      Past Surgical History:   Procedure Laterality Date    HX HYSTERECTOMY       Current Outpatient Medications   Medication Sig    diazePAM (VALIUM) 5 mg tablet Take 1/2 to 1 tablet twice daily AS NEEDED for anxiety. Not to fill before 5/1/19    mirtazapine (REMERON) 30 mg tablet Take 1 Tab by mouth nightly.  ofloxacin (FLOXIN) 0.3 % otic solution     rizatriptan (MAXALT) 5 mg tablet Take 5 mg by mouth once as needed for Migraine.  May repeat in 2 hours if needed    cyclobenzaprine (FLEXERIL) 10 mg tablet TAKE ONE TABLET BY MOUTH ONCE DAILY AS NEEDED FOR MUSCLE TENSION HEADACHE    atenolol (TENORMIN) 25 mg tablet Take 1 Tab by mouth every morning. Take 1 tab in the morning and 1/2 Tab in the evening    albuterol (PROVENTIL HFA) 90 mcg/actuation inhaler Take 1 Puff by inhalation every six (6) hours as needed for Wheezing or Shortness of Breath.  nitroglycerin (NITROSTAT) 0.4 mg SL tablet 1 Tab by SubLINGual route every five (5) minutes as needed for Chest Pain. For 2 doses. If cp is unrelieved after second dose call 911.  ergocalciferol (ERGOCALCIFEROL) 50,000 unit capsule Take 1 Cap by mouth every seven (7) days. For 8 weeks and then once monthly.  gabapentin (NEURONTIN) 600 mg tablet Take HALF tablet twice a day for 2 weeks, then increase to one tablet twice a day. To reduce tremors and headache frequency. No current facility-administered medications for this visit.       Allergies   Allergen Reactions    Bacitracin Swelling    Bactrim [Sulfamethoprim Ds] Rash    Ciprofloxacin Swelling    Fosamax [Alendronate] Nausea Only    Imitrex [Sumatriptan Succinate] Other (comments)     Suicidal ideation    Keflex [Cephalexin] Nausea and Vomiting    Pcn [Penicillins] Swelling    Primidone Other (comments)     Headache, nightmares    Trazodone Other (comments)     Nightmares     Family History   Problem Relation Age of Onset    Cancer Father         lung,  age 58    Alzheimer Father     Breast Cancer Paternal Aunt     Hypertension Mother     Alzheimer Mother     Osteoporosis Mother     COPD Mother     Arthritis-osteo Mother     Downs Syndrome Brother     No Known Problems Son     Obesity Daughter     Depression Daughter     Anesth Problems Neg Hx      Social History     Socioeconomic History    Marital status:      Spouse name: Not on file    Number of children: Not on file    Years of education: Not on file    Highest education level: Not on file   Occupational History    Not on file   Social Needs    Financial resource strain: Not on file    Food insecurity:     Worry: Not on file     Inability: Not on file    Transportation needs:     Medical: Not on file     Non-medical: Not on file   Tobacco Use    Smoking status: Former Smoker     Packs/day: 1.00     Years: 25.00     Pack years: 25.00     Types: Cigarettes     Last attempt to quit: 2018     Years since quittin.4    Smokeless tobacco: Never Used    Tobacco comment: 8 cig a day    Substance and Sexual Activity    Alcohol use: No     Alcohol/week: 0.0 oz    Drug use: Not Currently     Types: Marijuana    Sexual activity: Yes   Lifestyle    Physical activity:     Days per week: Not on file     Minutes per session: Not on file    Stress: Not on file   Relationships    Social connections:     Talks on phone: Not on file     Gets together: Not on file     Attends [employer] service: Not on file     Active member of club or organization: Not on file     Attends meetings of clubs or organizations: Not on file     Relationship status: Not on file    Intimate partner violence:     Fear of current or ex partner: Not on file     Emotionally abused: Not on file     Physically abused: Not on file     Forced sexual activity: Not on file   Other Topics Concern    Not on file   Social History Narrative    Not on file       -     Physical Examination:  Visit Vitals  BP (!) 138/96 (BP 1 Location: Right arm, BP Patient Position: Sitting)   Pulse (!) 102   Ht 5' 6\" (1.676 m)   Wt 119 lb 12.8 oz (54.3 kg)   BMI 19.34 kg/m²   -   - General: pleasant, no distress,   - HEENT:No scleral/conjunctival injection, EOMI,  MMM.  dentures  - Cardiovascular: regular, normal rate  - Respiratory: normal effort  - Integumentary:  no edema  - Neurological: alert  - Psychiatric: normal mood and affect    Data Reviewed:   No results found for: HBA1C, YCN6VHAE, TSV1OHZX   Lab Results   Component Value Date/Time    Sodium 139 2019 11:53 AM    Potassium 4.2 2019 11:53 AM    Creatinine 0.59 03/04/2019 11:53 AM        No results found for: CHOL, HDL, LDLC, LDL, LDLCEXT, TRIGL   Lab Results   Component Value Date/Time    TSH 1.870 02/07/2019 12:08 PM        Assessment/Plan:   1. Other osteoporosis without current pathological fracture   - likely due to premature surgical menopause, + smoking + family history, + small frame + vitamin d def  - check Vit D. Continue daily calcium supplement and calcium via diet  - discussed tx options. She previously had stomach problems with alendronate, so we will avoid oral tx. Discussed Reclast vs Prolia vs Tymlos/Forteo vs Evenity. - start Reclast every 12-18 months. Will treat for 5-6 years. Assess response In 2 years  Discussed potential side effects. Encouraged hydration on day of tx.    2. Vitamin D deficiency   - reassess. Will likely recommend daily dose based on levels   3. Premature surgical menopause   - was not treated. - no symptoms at this time. Estrogen not indicated. Greater than 50% of 30 minute visit was spent counseling the patient about above. Patient Instructions   Osteoporosis    Likely due to premature menopause primarily. Smoking and family history of vitamin d deficiency may be factors    Continue vitamin D  Recommend taking calcium daily    Check vitamin d and kidney function today    Will plan on Reclast - once yearly 15 minute infusion  Drink 2 glasses of water prior to treatment  If you develop muscle aches and fever, take Tylenol. Symptoms should resolve in 24-48 hours. Follow-up and Dispositions    · Return in about 1 year (around 6/14/2020).

## 2019-06-14 NOTE — PATIENT INSTRUCTIONS
Osteoporosis    Likely due to premature menopause primarily. Smoking and family history of vitamin d deficiency may be factors    Continue vitamin D  Recommend taking calcium daily    Check vitamin d and kidney function today    Will plan on Reclast - once yearly 15 minute infusion  Drink 2 glasses of water prior to treatment  If you develop muscle aches and fever, take Tylenol. Symptoms should resolve in 24-48 hours.

## 2019-06-17 LAB
25(OH)D3+25(OH)D2 SERPL-MCNC: 17.9 NG/ML (ref 30–100)
ALBUMIN SERPL-MCNC: 4.5 G/DL (ref 3.5–5.5)
BUN SERPL-MCNC: 10 MG/DL (ref 6–24)
BUN/CREAT SERPL: 17 (ref 9–23)
CALCIUM SERPL-MCNC: 9.4 MG/DL (ref 8.7–10.2)
CHLORIDE SERPL-SCNC: 108 MMOL/L (ref 96–106)
CO2 SERPL-SCNC: 23 MMOL/L (ref 20–29)
CREAT SERPL-MCNC: 0.6 MG/DL (ref 0.57–1)
GLUCOSE SERPL-MCNC: 92 MG/DL (ref 65–99)
PHOSPHATE SERPL-MCNC: 3 MG/DL (ref 2.5–4.5)
POTASSIUM SERPL-SCNC: 4.4 MMOL/L (ref 3.5–5.2)
SODIUM SERPL-SCNC: 146 MMOL/L (ref 134–144)
SPECIMEN STATUS REPORT, ROLRST: NORMAL

## 2019-06-18 ENCOUNTER — OFFICE VISIT (OUTPATIENT)
Dept: NEUROLOGY | Age: 59
End: 2019-06-18

## 2019-06-18 VITALS
BODY MASS INDEX: 19.25 KG/M2 | SYSTOLIC BLOOD PRESSURE: 126 MMHG | HEIGHT: 66 IN | WEIGHT: 119.8 LBS | RESPIRATION RATE: 20 BRPM | HEART RATE: 87 BPM | DIASTOLIC BLOOD PRESSURE: 86 MMHG | OXYGEN SATURATION: 98 %

## 2019-06-18 DIAGNOSIS — F98.5 ADULT ONSET STUTTERING: ICD-10-CM

## 2019-06-18 DIAGNOSIS — R25.1 TREMOR, ANXIETY RELATED: Primary | ICD-10-CM

## 2019-06-18 DIAGNOSIS — F41.9 TREMOR, ANXIETY RELATED: Primary | ICD-10-CM

## 2019-06-18 NOTE — PROGRESS NOTES
Patient is here for a follow up for tremors and stuttering. She states that she has been feeling about the same. Still having the same complaints. No other concerns at this time.

## 2019-06-18 NOTE — PROGRESS NOTES
Interval HPI:   This is a 61 y.o. female who is following up for stuttering starting after she found a brother dead (hx of down syndrome, sudden death)     Chief Complaint   Patient presents with    Tremors     Follow Up (Tremors and Stuttering)       Tried/ failed: propranolol (side effects, headache), primidone (side effects, headache), amitriptyline (caused suicidal thoughts), Gabapentin (didn't reduce her tremors or stuttering). Havent tried: Topiramate (pt reports having true allergic reaction/ rash to sulfa in the past)     She continues to c/o tremors and stuttering. Hasn't seen Counselor yet but plans on reaching out to a [facility]ing group    Brief ROS: as above or otherwise negative        Allergies   Allergen Reactions    Bacitracin Swelling    Bactrim [Sulfamethoprim Ds] Rash    Ciprofloxacin Swelling    Fosamax [Alendronate] Nausea Only    Imitrex [Sumatriptan Succinate] Other (comments)     Suicidal ideation    Keflex [Cephalexin] Nausea and Vomiting    Pcn [Penicillins] Swelling    Primidone Other (comments)     Headache, nightmares    Trazodone Other (comments)     Nightmares     Current Outpatient Medications   Medication Sig Dispense Refill    diazePAM (VALIUM) 5 mg tablet Take 1/2 to 1 tablet twice daily AS NEEDED for anxiety. Not to fill before 5/1/19 60 Tab 0    mirtazapine (REMERON) 30 mg tablet Take 1 Tab by mouth nightly. 30 Tab 5    rizatriptan (MAXALT) 5 mg tablet Take 5 mg by mouth once as needed for Migraine. May repeat in 2 hours if needed      cyclobenzaprine (FLEXERIL) 10 mg tablet TAKE ONE TABLET BY MOUTH ONCE DAILY AS NEEDED FOR MUSCLE TENSION HEADACHE 30 Tab 0    atenolol (TENORMIN) 25 mg tablet Take 1 Tab by mouth every morning. Take 1 tab in the morning and 1/2 Tab in the evening 45 Tab 1    albuterol (PROVENTIL HFA) 90 mcg/actuation inhaler Take 1 Puff by inhalation every six (6) hours as needed for Wheezing or Shortness of Breath.  1 Inhaler 1    gabapentin (NEURONTIN) 600 mg tablet Take HALF tablet twice a day for 2 weeks, then increase to one tablet twice a day. To reduce tremors and headache frequency. 49 Tab 0    nitroglycerin (NITROSTAT) 0.4 mg SL tablet 1 Tab by SubLINGual route every five (5) minutes as needed for Chest Pain. For 2 doses. If cp is unrelieved after second dose call 911. 1 Bottle 1    ergocalciferol (ERGOCALCIFEROL) 50,000 unit capsule Take 1 Cap by mouth every seven (7) days. For 8 weeks and then once monthly. 12 Cap 3    ofloxacin (FLOXIN) 0.3 % otic solution   3       PMHx:   Past Medical History:   Diagnosis Date    Agoraphobia     Anxiety     CAD (coronary artery disease)     Chronic obstructive pulmonary disease (H[facility])     Claustrophobia     History of mammography, screening 2012    Normal    Ill-defined condition     WPW    Ill-defined condition     speech impairment    Pap smear for cervical cancer screening several years    Psychiatric disorder     depression, anxiety    Stuttering     Tremors of nervous system     Fazal-Parkinson-White (WPW) pattern      PSHx:  has a past surgical history that includes hx hysterectomy. SocHx:  reports that she quit smoking about 5 months ago. Her smoking use included cigarettes. She has a 25.00 pack-year smoking history. She has never used smokeless tobacco. She reports that she has current or past drug history. Drug: Marijuana. She reports that she does not drink alcohol. FHx: family history includes Alzheimer in her father and mother; Nel[place] Spells in her mother; Breast Cancer in her paternal aunt; COPD in her mother; Cancer in her father; Depression in her daughter; Juan Fair in her brother; Hypertension in her mother; No Known Problems in her son; Obesity in her daughter; Osteoporosis in her mother. Physical Exam  Blood pressure 126/86, pulse 87, resp. rate 20, height 5' 6\" (1.676 m), weight 54.3 kg (119 lb 12.8 oz), SpO2 98 %.     No acute distress  Neck: no stiffness  Skin: no rashes    Focused Neurological Exam     Mental status: Alert and oriented to person, place situation. Language: normal comprehension, stuttering speech    CNs:   Visual fields grossly normal  Extraocular movements intact, no nystagmus  Face appears symmetric and facial strength normal.    Hearing is intact to casual conversation. Sensory: intact light touch in all 4 extremities  Motor: Normal bulk and strength in all 4 extremities. Reflexes: DTRs are symmetric, 2+   Cerebellar: no resting tremors; + postural/ intention tremors bilateral  Gait: normal    Impression      ICD-10-CM ICD-9-CM    1. Tremor, anxiety related R25.1 781.0     F41.9 300.00    2. Adult onset stuttering F80.6 46.0        61 y.o. female with psychological speech changes/ stuttering starting after finding a brother who had suddenly passed away (not suicide). Also has severe anxiety, PTSD and frequent headaches. Discussed with her that the symptoms aren't neurologic in nature and I recommended that she Counselor and/ or Psychiatrist to help with the underlying issues.      No neurology follow up needed      Signed By: Britt Rico MD     June 18, 2019

## 2019-06-21 ENCOUNTER — TELEPHONE (OUTPATIENT)
Dept: ENDOCRINOLOGY | Age: 59
End: 2019-06-21

## 2019-06-21 NOTE — TELEPHONE ENCOUNTER
6/21/2019  10:36 AM    Patient called in wanting results from labs. She would like a call back.       Thanks

## 2019-06-27 NOTE — TELEPHONE ENCOUNTER
6/27/2019  11:02 AM      Ms. Alamo Person would like to know her labs results and would like to know when will her first injection start.         Thanks

## 2019-06-28 NOTE — TELEPHONE ENCOUNTER
I called and left a voicemail. If she calls back, would relay the following to her:     Vitamin D was low at 18. Goal is 30 +. She was prescribed ergocalciferol 50,000 units in December. Has she been taking this weekly, or did she stop after first 12 weeks? If she stopped several month ago, I recommend she resume this. If she has been taking this weekly since December, then we will need to increase this. Let me know what she is taking and I'll recommend supplementation to get her levels up over the next 1-2 weeks in preparation for the Reclast.     Please see if Reclast can be scheduled in 2 weeks (not next week, but before 7/14 as she would have to re-do kidney function test if done > 1 month from labs (labs done 6/14). Please remind her to drink 2 large glasses of water on the morning of the Reclast infusion to ensure she is well hydrated prior to infusion. We do need to supplement vitamin D prior to Reclast infusion  We will mail lab letter.

## 2019-07-17 ENCOUNTER — OFFICE VISIT (OUTPATIENT)
Dept: FAMILY MEDICINE CLINIC | Age: 59
End: 2019-07-17

## 2019-07-17 VITALS
TEMPERATURE: 97.9 F | BODY MASS INDEX: 20.25 KG/M2 | OXYGEN SATURATION: 98 % | RESPIRATION RATE: 18 BRPM | HEART RATE: 91 BPM | SYSTOLIC BLOOD PRESSURE: 131 MMHG | HEIGHT: 66 IN | DIASTOLIC BLOOD PRESSURE: 90 MMHG | WEIGHT: 126 LBS

## 2019-07-17 DIAGNOSIS — F41.9 ANXIETY: Primary | ICD-10-CM

## 2019-07-17 DIAGNOSIS — Z79.899 CHRONIC PRESCRIPTION BENZODIAZEPINE USE: ICD-10-CM

## 2019-07-17 RX ORDER — DIAZEPAM 5 MG/1
2.5 TABLET ORAL
Qty: 30 TAB | Refills: 0 | Status: SHIPPED | OUTPATIENT
Start: 2019-07-17 | End: 2019-07-17 | Stop reason: DRUGHIGH

## 2019-07-17 RX ORDER — DIAZEPAM 5 MG/1
2.5 TABLET ORAL
Qty: 30 TAB | Refills: 0 | Status: SHIPPED | OUTPATIENT
Start: 2019-08-19 | End: 2019-07-17 | Stop reason: DRUGHIGH

## 2019-07-17 RX ORDER — DIAZEPAM 5 MG/1
2.5 TABLET ORAL
Qty: 30 TAB | Refills: 0 | Status: SHIPPED | OUTPATIENT
Start: 2019-09-17 | End: 2019-10-16 | Stop reason: SDUPTHER

## 2019-07-17 RX ORDER — DIAZEPAM 5 MG/1
2.5 TABLET ORAL
Qty: 30 TAB | Refills: 0 | Status: SHIPPED | OUTPATIENT
Start: 2019-08-19 | End: 2019-09-18

## 2019-07-17 RX ORDER — DIAZEPAM 5 MG/1
2.5 TABLET ORAL
Qty: 30 TAB | Refills: 0 | Status: SHIPPED | OUTPATIENT
Start: 2019-07-17 | End: 2019-08-16

## 2019-07-17 NOTE — PROGRESS NOTES
Richard Alvarez  61 y.o. female  1960  [id number]    [facility]  Progress Note     Encounter Date: 7/17/2019    Assessment and Plan:     Encounter Diagnoses     ICD-10-CM ICD-9-CM   1. Anxiety F41.9 300.00   2. Chronic prescription benzodiazepine use Z79.899 V58.69       1. Anxiety  2. Chronic prescription benzodiazepine use  UDS collected today. Patient to continue trying to wean down on benzos. - diazePAM (VALIUM) 5 mg tablet; Take 0.5 Tabs by mouth two (2) times daily as needed for Anxiety for up to 30 days. Max Daily Amount: 5 mg. Earliest Fill = start date. Dispense: 30 Tab; Refill: 0  - diazePAM (VALIUM) 5 mg tablet; Take 0.5 Tabs by mouth two (2) times daily as needed for Anxiety for up to 30 days. Max Daily Amount: 5 mg. Earliest Fill = start date. Dispense: 30 Tab; Refill: 0  - diazePAM (VALIUM) 5 mg tablet; Take 0.5 Tabs by mouth two (2) times daily as needed for Anxiety for up to 30 days. Max Daily Amount: 5 mg. Earliest Fill = start date. Dispense: 30 Tab; Refill: 0      I have discussed the diagnosis with the patient and the intended plan as seen in the above orders. she has expressed understanding. The patient has received an after-visit summary and questions were answered concerning future plans. I have discussed medication side effects and warnings with the patient as well. Electronically Signed: Piper Baires MD    Current Medications after this visit     Current Outpatient Medications   Medication Sig    ergocalciferol (ERGOCALCIFEROL) 50,000 unit capsule Take 1 Cap by mouth every seven (7) days. For 8 weeks and then once monthly.  diazePAM (VALIUM) 5 mg tablet Take 1/2 to 1 tablet twice daily AS NEEDED for anxiety. Not to fill before 5/1/19    mirtazapine (REMERON) 30 mg tablet Take 1 Tab by mouth nightly.  ofloxacin (FLOXIN) 0.3 % otic solution     rizatriptan (MAXALT) 5 mg tablet Take 5 mg by mouth once as needed for Migraine.  May repeat in 2 hours if needed    cyclobenzaprine (FLEXERIL) 10 mg tablet TAKE ONE TABLET BY MOUTH ONCE DAILY AS NEEDED FOR MUSCLE TENSION HEADACHE    atenolol (TENORMIN) 25 mg tablet Take 1 Tab by mouth every morning. Take 1 tab in the morning and 1/2 Tab in the evening    albuterol (PROVENTIL HFA) 90 mcg/actuation inhaler Take 1 Puff by inhalation every six (6) hours as needed for Wheezing or Shortness of Breath.  nitroglycerin (NITROSTAT) 0.4 mg SL tablet 1 Tab by SubLINGual route every five (5) minutes as needed for Chest Pain. For 2 doses. If cp is unrelieved after second dose call 911.  gabapentin (NEURONTIN) 600 mg tablet Take HALF tablet twice a day for 2 weeks, then increase to one tablet twice a day. To reduce tremors and headache frequency. (Patient not taking: Reported on 7/17/2019)     No current facility-administered medications for this visit. There are no discontinued medications. ~~~~~~~~~~~~~~~~~~~~~~~~~~~~~~~~~~~~~~~~~~~~~~    Chief Complaint   Patient presents with    Follow-up    Medication Evaluation       History provided by patient  History of Present Illness   Stephy James is a 61 y.o. female who presents to clinic today for:    Anxiety  Patient present with cc of anxiety. Patient is only taking valium once a day. She has been continuing to use her online support group. She has been walking at the mall 2-3 times a week with another support group. She is struggling with grief as tomorrow is the 1 year anniversary of her brother's death. Patient admitted that she uses marijuana as an appetite stimulant. Review of Systems   Review of Systems   Constitutional: Negative for chills and fever. Cardiovascular: Negative for chest pain and palpitations. Skin: Negative for itching and rash. Psychiatric/Behavioral: Negative for depression, substance abuse and suicidal ideas. The patient is nervous/anxious and has insomnia.          Vitals/Objective:     Vitals:    07/17/19 0921   BP: 131/90   Pulse: 91   Resp: 18   Temp: 97.9 °F (36.6 °C)   TempSrc: Oral   SpO2: 98%   Weight: 126 lb (57.2 kg)   Height: 5' 6\" (1.676 m)     Body mass index is 20.34 kg/m². Wt Readings from Last 3 Encounters:   19 126 lb (57.2 kg)   19 119 lb 12.8 oz (54.3 kg)   19 119 lb 12.8 oz (54.3 kg)       Physical Exam   Constitutional: She is oriented to person, place, and time. She appears well-developed and well-nourished. HENT:   Head: Normocephalic and atraumatic. Right Ear: External ear normal.   Left Ear: External ear normal.   Cardiovascular: Normal rate and regular rhythm. Pulmonary/Chest: Effort normal and breath sounds normal.   Neurological: She is alert and oriented to person, place, and time. No results found for this or any previous visit (from the past 24 hour(s)). Disposition     Future Appointments   Date Time Provider Mary Elliott   2019 10:00 AM Renate Leigh MD [facility]   2019  1:00 PM [facility]       History   Patient's past medical, surgical and family histories were reviewed and updated.     Past Medical History:   Diagnosis Date    Agoraphobia     Anxiety     CAD (coronary artery disease)     Chronic obstructive pulmonary disease (HCC)     Claustrophobia     History of mammography, screening     Normal    Ill-defined condition     WPW    Ill-defined condition     speech impairment    Pap smear for cervical cancer screening several years    Psychiatric disorder     depression, anxiety    Stuttering     Tremors of nervous system     Fazal-Parkinson-White (WPW) pattern      Past Surgical History:   Procedure Laterality Date    HX HYSTERECTOMY       Family History   Problem Relation Age of Onset    Cancer Father         lung,  age 58    Alzheimer Father     Breast Cancer Paternal Aunt     Hypertension Mother     Alzheimer Mother     Osteoporosis Mother     COPD Mother     Arthritis-osteo Mother    Jennifer [place] Syndrome Brother     No Known Problems Son     Obesity Daughter     Depression Daughter     Anesth Problems Neg Hx      Social History     Tobacco Use    Smoking status: Former Smoker     Packs/day: 1.00     Years: 25.00     Pack years: 25.00     Types: Cigarettes     Last attempt to quit: 2018     Years since quittin.5    Smokeless tobacco: Never Used    Tobacco comment: 8 cig a day    Substance Use Topics    Alcohol use: No     Alcohol/week: 0.0 standard drinks    Drug use: Not Currently     Types: Marijuana       Allergies     Allergies   Allergen Reactions    Bacitracin Swelling    Bactrim [Sulfamethoprim Ds] Rash    Ciprofloxacin Swelling    Fosamax [Alendronate] Nausea Only    Imitrex [Sumatriptan Succinate] Other (comments)     Suicidal ideation    Keflex [Cephalexin] Nausea and Vomiting    Pcn [Penicillins] Swelling    Primidone Other (comments)     Headache, nightmares    Trazodone Other (comments)     Nightmares

## 2019-07-17 NOTE — PATIENT INSTRUCTIONS

## 2019-07-17 NOTE — PROGRESS NOTES
Jacquie Faye is a 61 y.o. female    Chief Complaint   Patient presents with    Follow-up    Medication Evaluation       Health Maintenance Due   Topic Date Due    Hepatitis C Screening  1960    Shingrix Vaccine Age 50> (1 of 2) 01/27/2010    FOBT Q 1 YEAR AGE 50-75  01/27/2010       Visit Vitals  /90 (BP 1 Location: Left arm, BP Patient Position: Sitting)   Pulse 91   Temp 97.9 °F (36.6 °C) (Oral)   Resp 18   Ht 5' 6\" (1.676 m)   Wt 126 lb (57.2 kg)   SpO2 98%   BMI 20.34 kg/m²       1. Have you been to the ER, urgent care clinic since your last visit? Hospitalized since your last visit? NO    2. Have you seen or consulted any other health care providers outside of the [address] [place] [address] since your last visit? Include any pap smears or colon screening.   NO

## 2019-07-22 LAB — DRUGS UR: NORMAL

## 2019-07-26 ENCOUNTER — DOCUMENTATION ONLY (OUTPATIENT)
Dept: ENDOCRINOLOGY | Age: 59
End: 2019-07-26

## 2019-07-26 RX ORDER — ZOLEDRONIC ACID 5 MG/100ML
5 INJECTION, SOLUTION INTRAVENOUS ONCE
Status: COMPLETED | OUTPATIENT
Start: 2019-07-29 | End: 2019-07-29

## 2019-07-29 ENCOUNTER — HOSPITAL ENCOUNTER (OUTPATIENT)
Dept: INFUSION THERAPY | Age: 59
Discharge: HOME | End: 2019-07-29
Payer: MEDICAID

## 2019-07-29 LAB
ANION GAP BLD CALC-SCNC: 11 MMOL/L (ref 10–20)
BUN BLD-MCNC: 11 MG/DL (ref 9–20)
CA-I BLD-MCNC: 1.13 MMOL/L (ref 1.12–1.32)
CHLORIDE BLD-SCNC: 105 MMOL/L (ref 98–107)
CO2 BLD-SCNC: 29 MMOL/L (ref 21–32)
CREAT BLD-MCNC: 0.7 MG/DL (ref 0.6–1.3)
GLUCOSE BLD-MCNC: 80 MG/DL (ref 65–100)
HCT VFR BLD CALC: 39 % (ref 35–47)
POTASSIUM BLD-SCNC: 4.3 MMOL/L (ref 3.5–5.1)
SERVICE CMNT-IMP: NORMAL
SODIUM BLD-SCNC: 140 MMOL/L (ref 136–145)

## 2019-07-29 PROCEDURE — 96374 THER/PROPH/DIAG INJ IV PUSH: CPT

## 2019-07-29 PROCEDURE — 74011250636 HC RX REV CODE- 250/636: Performed by: INTERNAL MEDICINE

## 2019-07-29 PROCEDURE — 80047 BASIC METABLC PNL IONIZED CA: CPT

## 2019-07-29 RX ADMIN — ZOLEDRONIC ACID 5 MG: 5 INJECTION, SOLUTION INTRAVENOUS at 14:05

## 2019-08-02 VITALS
DIASTOLIC BLOOD PRESSURE: 81 MMHG | SYSTOLIC BLOOD PRESSURE: 127 MMHG | WEIGHT: 122.7 LBS | TEMPERATURE: 97.3 F | BODY MASS INDEX: 19.8 KG/M2 | RESPIRATION RATE: 18 BRPM | HEART RATE: 84 BPM

## 2019-08-02 NOTE — PROGRESS NOTES
Educated on potential for pain/aches post treatment  Instructed to use Tylenol 650mg po every 6-8 hours PRN for pain  Advised to list Reclast on home medication list

## 2019-08-02 NOTE — PROGRESS NOTES
[facility] Short Visit Progress Note    1430 Patient admitted to [facility] for yearly Reclast ambulatory in stable condition. Assessment completed. No new concerns voiced. Pt denies recent dental work. Educated on risks of same in next 3mo. Advised pt to list Reclast on home medication list.  Pt verbalized understanding. IV #24 to left AC x1 attempt. Vital Signs:  Visit Vitals  /81   Pulse 84   Temp 97.3 °F (36.3 °C)   Resp 18   Wt 55.7 kg (122 lb 11.2 oz)   Breastfeeding? No   BMI 19.80 kg/m²     Lab Results:  Results for Kaye King (MRN [id number]) as of 8/2/2019 10:30   Ref. Range 7/29/2019 13:54   GLUCOSE,FAST - POC Latest Ref Range: 65 - 100 mg/dL 80   Sodium (POC) Latest Ref Range: 136 - 145 mmol/L 140   Potassium (POC) Latest Ref Range: 3.5 - 5.1 mmol/L 4.3   Chloride, POC Latest Ref Range: 98 - 107 mmol/L 105   CO2 (POC) Latest Ref Range: 21 - 32 mmol/L 29   BUN (POC) Latest Ref Range: 9 - 20 mg/dL 11   Anion gap, POC Latest Ref Range: 10 - 20 mmol/L 11   Creatinine, POC Latest Ref Range: 0.6 - 1.3 mg/dL 0.7   Calcium, ionized (POC) Latest Ref Range: 1.12 - 1.32 mmol/L 1.13   GFRNA, POC Latest Ref Range: >60 ml/min/1.73m2 >60   GFRAA, POC Latest Ref Range: >60 ml/min/1.73m2 >60   Hematocrit (POC) Latest Ref Range: 35.0 - 47.0 % 39   Comment Latest Units:   Comment Not Indicated. Medications:  Medications Administered     zoledronic acid (RECLAST) IVPB 5 mg     Admin Date  07/29/2019 Action  Given Dose  5 mg Rate  400 mL/hr Route  IntraVENous Administered By  Deandre Cruz, TAO                3104 Patient tolerated treatment well. IV removed. DC instructions provided for Reclast.  Patient discharged from [facility] ambulatory in no distress. Patient aware to scheduled next appointment in one year.     Future Appointments   Date Time Provider Mary Elliott   10/16/2019 10:00 AM Frederic Leigh MD [facility]   7/27/2020  1:00 PM [facility] [address] [place] [address]

## 2019-10-16 ENCOUNTER — OFFICE VISIT (OUTPATIENT)
Dept: FAMILY MEDICINE CLINIC | Age: 59
End: 2019-10-16

## 2019-10-16 VITALS
SYSTOLIC BLOOD PRESSURE: 131 MMHG | WEIGHT: 121 LBS | HEART RATE: 87 BPM | HEIGHT: 66 IN | BODY MASS INDEX: 19.44 KG/M2 | RESPIRATION RATE: 16 BRPM | OXYGEN SATURATION: 96 % | TEMPERATURE: 97.8 F | DIASTOLIC BLOOD PRESSURE: 82 MMHG

## 2019-10-16 DIAGNOSIS — R25.1 TREMOR: ICD-10-CM

## 2019-10-16 DIAGNOSIS — Z79.899 CHRONIC PRESCRIPTION BENZODIAZEPINE USE: ICD-10-CM

## 2019-10-16 DIAGNOSIS — G44.209 MUSCLE TENSION HEADACHE: ICD-10-CM

## 2019-10-16 DIAGNOSIS — Z23 ENCOUNTER FOR IMMUNIZATION: ICD-10-CM

## 2019-10-16 DIAGNOSIS — F41.9 ANXIETY: Primary | ICD-10-CM

## 2019-10-16 RX ORDER — ATENOLOL 25 MG/1
12.5 TABLET ORAL
Qty: 45 TAB | Refills: 1 | Status: ON HOLD | OUTPATIENT
Start: 2019-10-16 | End: 2022-09-22 | Stop reason: SDUPTHER

## 2019-10-16 RX ORDER — CLONAZEPAM 0.5 MG/1
0.25 TABLET ORAL
Qty: 30 TAB | Refills: 0 | Status: SHIPPED | OUTPATIENT
Start: 2019-10-17 | End: 2019-11-15 | Stop reason: SDUPTHER

## 2019-10-16 RX ORDER — CYCLOBENZAPRINE HCL 10 MG
TABLET ORAL
Qty: 30 TAB | Refills: 2 | Status: SHIPPED | OUTPATIENT
Start: 2019-10-16

## 2019-10-16 NOTE — PROGRESS NOTES
Identified pt with two pt identifiers(name and ). Reviewed record in preparation for visit and have obtained necessary documentation. Chief Complaint   Patient presents with    Medication Refill     3 mos f/u         Health Maintenance Due   Topic    Hepatitis C Screening     Shingrix Vaccine Age 50> (1 of 2)    FOBT Q 1 YEAR AGE 54-65     Influenza Age 5 to Adult    Pt request an flu shot     Coordination of Care Questionnaire:  :   1) Have you been to an emergency room, urgent care, or hospitalized since your last visit? If yes, where when, and reason for visit? no      2. Have seen or consulted any other health care provider since your last visit? If yes, where when, and reason for visit?  no        Patient is accompanied by self I have received verbal consent from Cece Caballero to discuss any/all medical information while they are present in the room.

## 2019-10-16 NOTE — PROGRESS NOTES
Tila Blair  61 y.o. female  1960  [id number]    [facility]  Progress Note     Encounter Date: 10/16/2019    Assessment and Plan:     Encounter Diagnoses     ICD-10-CM ICD-9-CM   1. Anxiety F41.9 300.00   2. Chronic prescription benzodiazepine use Z79.899 V58.69   3. Tremor R25.1 781.0   4. Muscle tension headache G44.209 307.81   5. Encounter for immunization Z23 V03.89       1. Anxiety  2. Chronic prescription benzodiazepine use  Switch to clonazapem low dose. Close follow up in 1 month. Plan for UDS at that time given switch with medication. - clonazePAM (KLONOPIN) 0.5 mg tablet; Take 0.5 Tabs by mouth two (2) times daily as needed (Anxiety). Max Daily Amount: 0.5 mg.  Dispense: 30 Tab; Refill: 0    3. Tremor  - atenolol (TENORMIN) 25 mg tablet; Take 0.5 Tabs by mouth every morning. Dispense: 45 Tab; Refill: 1    4. Muscle tension headache  - cyclobenzaprine (FLEXERIL) 10 mg tablet; TAKE ONE TABLET BY MOUTH ONCE DAILY AS NEEDED FOR MUSCLE TENSION HEADACHE  Dispense: 30 Tab; Refill: 2    5. Encounter for immunization  - INFLUENZA VIRUS VAC QUAD,SPLIT,PRESV FREE SYRINGE IM  - SC IMMUNIZ ADMIN,1 SINGLE/COMB VAC/TOXOID      I have discussed the diagnosis with the patient and the intended plan as seen in the above orders. she has expressed understanding. The patient has received an after-visit summary and questions were answered concerning future plans. I have discussed medication side effects and warnings with the patient as well. Electronically Signed: Riri Duran MD    Current Medications after this visit     Current Outpatient Medications   Medication Sig    [START ON 10/17/2019] clonazePAM (KLONOPIN) 0.5 mg tablet Take 0.5 Tabs by mouth two (2) times daily as needed (Anxiety). Max Daily Amount: 0.5 mg.    atenolol (TENORMIN) 25 mg tablet Take 0.5 Tabs by mouth every morning.     cyclobenzaprine (FLEXERIL) 10 mg tablet TAKE ONE TABLET BY MOUTH ONCE DAILY AS NEEDED FOR MUSCLE TENSION HEADACHE    ergocalciferol (ERGOCALCIFEROL) 50,000 unit capsule Take 1 Cap by mouth every seven (7) days. For 8 weeks and then once monthly.  ofloxacin (FLOXIN) 0.3 % otic solution     rizatriptan (MAXALT) 5 mg tablet Take 5 mg by mouth once as needed for Migraine. May repeat in 2 hours if needed    albuterol (PROVENTIL HFA) 90 mcg/actuation inhaler Take 1 Puff by inhalation every six (6) hours as needed for Wheezing or Shortness of Breath.  nitroglycerin (NITROSTAT) 0.4 mg SL tablet 1 Tab by SubLINGual route every five (5) minutes as needed for Chest Pain. For 2 doses. If cp is unrelieved after second dose call 911.  gabapentin (NEURONTIN) 600 mg tablet Take HALF tablet twice a day for 2 weeks, then increase to one tablet twice a day. To reduce tremors and headache frequency. (Patient not taking: Reported on 7/17/2019)     No current facility-administered medications for this visit. Medications Discontinued During This Encounter   Medication Reason    diazePAM (VALIUM) 5 mg tablet Alternate Therapy    mirtazapine (REMERON) 30 mg tablet Clinically Ineffective    diazePAM (VALIUM) 5 mg tablet Duplicate Order    atenolol (TENORMIN) 25 mg tablet     cyclobenzaprine (FLEXERIL) 10 mg tablet Reorder     ~~~~~~~~~~~~~~~~~~~~~~~~~~~~~~~~~~~~~~~~~~~~~~    Chief Complaint   Patient presents with    Medication Refill     3 mos f/u        History provided by patient  History of Present Illness   Franck Hills is a 61 y.o. female who presents to clinic today for:      Anxiety/tremor Review:  Patient is seen for anxiety disorder. Patient reports that she does not feel that the 1/2 tab AM and PM is not keeping her 'level' throughout the day. Feels that the Remeron is making her twitchy even if she takes it in the morning. She is requesting to be switched to klonipin. Reports that tremor is still present.    -  reviewed on 10/16/19; diazepam 5 mg #30 filled on 9/22/2019.   - Pills brought to office as 15 pills remaining. 3 most recent PHQ Screens 10/16/2019   PHQ Not Done -   Little interest or pleasure in doing things Several days   Feeling down, depressed, irritable, or hopeless Several days   Total Score PHQ 2 2   Trouble falling or staying asleep, or sleeping too much Nearly every day   Feeling tired or having little energy Several days   Poor appetite, weight loss, or overeating Nearly every day   Feeling bad about yourself - or that you are a failure or have let yourself or your family down Several days   Trouble concentrating on things such as school, work, reading, or watching TV Several days   Moving or speaking so slowly that other people could have noticed; or the opposite being so fidgety that others notice Nearly every day   Thoughts of being better off dead, or hurting yourself in some way Several days   PHQ 9 Score 15   How difficult have these problems made it for you to do your work, take care of your home and get along with others -     MIRANDA 2/7 10/16/2019   Feeling nervous, anxious or on edge? 1   Not being able to stop or control worrying? 3   MIRANDA-2 Subtotal 4   Worrying too much about different things? 3   Trouble relaxing? 2   Being so restless that it is hard to sit still? 3   Becoming easily annoyed or irritable? 2   Feeling afraid as if something awful might happen? 2   MIRANDA-7 Total Score 16       Health Maintenance    Health Maintenance Due   Topic Date Due    Hepatitis C Screening  1960    Shingrix Vaccine Age 50> (1 of 2) 01/27/2010    FOBT Q 1 YEAR AGE 50-75  01/27/2010     Review of Systems   Review of Systems   Constitutional:        See HPI for pertinent review of systems      Vitals/Objective:     Vitals:    10/16/19 0950   BP: 131/82   Pulse: 87   Resp: 16   Temp: 97.8 °F (36.6 °C)   TempSrc: Oral   SpO2: 96%   Weight: 121 lb (54.9 kg)   Height: 5' 6\" (1.676 m)     Body mass index is 19.53 kg/m².     Wt Readings from Last 3 Encounters:   10/16/19 121 lb (54.9 kg)   19 122 lb 11.2 oz (55.7 kg)   19 126 lb (57.2 kg)       Physical Exam   Constitutional: She is oriented to person, place, and time. She appears well-developed and well-nourished. HENT:   Head: Normocephalic and atraumatic. Right Ear: External ear normal.   Left Ear: External ear normal.   Cardiovascular: Normal rate and regular rhythm. Pulmonary/Chest: Effort normal and breath sounds normal.   Neurological: She is alert and oriented to person, place, and time. She displays tremor. She displays no atrophy. No cranial nerve deficit. She exhibits normal muscle tone. She displays no seizure activity. No results found for this or any previous visit (from the past 24 hour(s)). Disposition     Follow-up and Dispositions  ·   Return in about 4 weeks (around 2019) for medication adjustment. .       Future Appointments   Date Time Provider Mary Elliott   11/15/2019 10:00 AM Frederic Leigh MD [facility]   2020  1:00 PM SS INF5 CH4 <1H [facility]       History   Patient's past medical, surgical and family histories were reviewed and updated.     Past Medical History:   Diagnosis Date    Agoraphobia     Anxiety     CAD (coronary artery disease)     Chronic obstructive pulmonary disease (HCC)     Claustrophobia     History of mammography, screening     Normal    Ill-defined condition     WPW    Ill-defined condition     speech impairment    Pap smear for cervical cancer screening several years    Psychiatric disorder     depression, anxiety    Stuttering     Tremors of nervous system     Fazal-Parkinson-White (WPW) pattern      Past Surgical History:   Procedure Laterality Date    HX HYSTERECTOMY       Family History   Problem Relation Age of Onset    Cancer Father         lung,  age 58    Alzheimer Father     Breast Cancer Paternal Aunt     Hypertension Mother     Alzheimer Mother     Osteoporosis Mother     COPD Mother    [facility] Arthritis-osteo Mother    Roderick [place] Syndrome Brother     No Known Problems Son     Obesity Daughter     Depression Daughter     Anesth Problems Neg Hx      Social History     Tobacco Use    Smoking status: Former Smoker     Packs/day: 1.00     Years: 25.00     Pack years: 25.00     Types: Cigarettes     Last attempt to quit: 2018     Years since quittin.8    Smokeless tobacco: Never Used    Tobacco comment: 8 cig a day    Substance Use Topics    Alcohol use: No     Alcohol/week: 0.0 standard drinks    Drug use: Not Currently     Types: Marijuana       Allergies     Allergies   Allergen Reactions    Bacitracin Swelling    Bactrim [Sulfamethoprim Ds] Rash    Ciprofloxacin Swelling    Fosamax [Alendronate] Nausea Only    Gabapentin Other (comments)     Muscle spasms    Imitrex [Sumatriptan Succinate] Other (comments)     Suicidal ideation    Keflex [Cephalexin] Nausea and Vomiting    Pcn [Penicillins] Swelling    Primidone Other (comments)     Headache, nightmares    Trazodone Other (comments)     Nightmares

## 2019-10-16 NOTE — PATIENT INSTRUCTIONS
Vaccine Information Statement    Influenza (Flu) Vaccine (Inactivated or Recombinant): What You Need to Know    Many Vaccine Information Statements are available in [place] and other languages. See www.immunize.org/vis  Hojas de información sobre vacunas están disponibles en español y en muchos otros idiomas. Visite www.immunize.org/vis    1. Why get vaccinated? Influenza vaccine can prevent influenza (flu). Flu is a contagious disease that spreads around the [facility] every year, usually between October and May. Anyone can get the flu, but it is more dangerous for some people. Infants and young children, people 72years of age and older, pregnant women, and people with certain health conditions or a weakened immune system are at greatest risk of flu complications. Pneumonia, bronchitis, sinus infections and ear infections are examples of flu-related complications. If you have a medical condition, such as heart disease, cancer or diabetes, flu can make it worse. Flu can cause fever and chills, sore throat, muscle aches, fatigue, cough, headache, and runny or stuffy nose. Some people may have vomiting and diarrhea, though this is more common in children than adults. Each year thousands of people in the [facility] die from flu, and many more are hospitalized. Flu vaccine prevents millions of illnesses and flu-related visits to the doctor each year. 2. Influenza vaccines     CDC recommends everyone 10months of age and older get vaccinated every flu season. Children 6 months through 6years of age may need 2 doses during a single flu season. Everyone else needs only 1 dose each flu season. It takes about 2 weeks for protection to develop after vaccination. There are many flu viruses, and they are always changing. Each year a new flu vaccine is made to protect against three or four viruses that are likely to cause disease in the upcoming flu season.  Even when the vaccine doesnt exactly match these viruses, it may still provide some protection. Influenza vaccine does not cause flu. Influenza vaccine may be given at the same time as other vaccines. 3. Talk with your health care provider    Tell your vaccine provider if the person getting the vaccine:   Has had an allergic reaction after a previous dose of influenza vaccine, or has any severe, life-threatening allergies.  Has ever had Guillain-Barré Syndrome (also called GBS). In some cases, your health care provider may decide to postpone influenza vaccination to a future visit. People with minor illnesses, such as a cold, may be vaccinated. People who are moderately or severely ill should usually wait until they recover before getting influenza vaccine. Your health care provider can give you more information. 4. Risks of a reaction     Soreness, redness, and swelling where shot is given, fever, muscle aches, and headache can happen after influenza vaccine.  There may be a very small increased risk of Guillain-Barré Syndrome (GBS) after inactivated influenza vaccine (the flu shot). [facility] children who get the flu shot along with pneumococcal vaccine (PCV13), and/or DTaP vaccine at the same time might be slightly more likely to have a seizure caused by fever. Tell your health care provider if a child who is getting flu vaccine has ever had a seizure. People sometimes faint after medical procedures, including vaccination. Tell your provider if you feel dizzy or have vision changes or ringing in the ears. As with any medicine, there is a very remote chance of a vaccine causing a severe allergic reaction, other serious injury, or death. 5. What if there is a serious problem? An allergic reaction could occur after the vaccinated person leaves the clinic.  If you see signs of a severe allergic reaction (hives, swelling of the face and throat, difficulty breathing, a fast heartbeat, dizziness, or weakness), call 9-1-1 and get the person to the nearest hospital.    For other signs that concern you, call your health care provider. Adverse reactions should be reported to the Vaccine Adverse Event Reporting System (VAERS). Your health care provider will usually file this report, or you can do it yourself. Visit the VAERS website at www.vaers. [facility].gov or call 6-957.924.2407. VAERS is only for reporting reactions, and VAERS staff do not give medical advice. 6. The National Vaccine Injury Compensation Program    The [facility] Vaccine Injury Compensation Program (VICP) is a federal program that was created to compensate people who may have been injured by certain vaccines. Visit the VICP website at www.[facility].gov/vaccinecompensation or call 3-986.920.4544 to learn about the program and about filing a claim. There is a time limit to file a claim for compensation. 7. How can I learn more?  Ask your health care provider.  Call your local or state health department.  Contact the Centers for Disease Control and Prevention (CDC):  - Call 1-514.675.3756 (1-800-CDC-INFO) or  - Visit CDCs influenza website at www.cdc.gov/flu    Vaccine Information Statement (Interim)  Inactivated Influenza Vaccine   8/15/2019  [id number]   Department of Health and Human Services  Centers for Disease Control and Prevention    Office Use Only

## 2019-10-23 ENCOUNTER — DOCUMENTATION ONLY (OUTPATIENT)
Dept: CARDIOLOGY CLINIC | Age: 59
End: 2019-10-23

## 2019-10-23 ENCOUNTER — OFFICE VISIT (OUTPATIENT)
Dept: CARDIOLOGY CLINIC | Age: 59
End: 2019-10-23

## 2019-10-23 VITALS
HEART RATE: 86 BPM | BODY MASS INDEX: 19.96 KG/M2 | DIASTOLIC BLOOD PRESSURE: 80 MMHG | HEIGHT: 66 IN | RESPIRATION RATE: 16 BRPM | SYSTOLIC BLOOD PRESSURE: 128 MMHG | WEIGHT: 124.2 LBS | OXYGEN SATURATION: 96 %

## 2019-10-23 DIAGNOSIS — I45.81 LONG Q-T SYNDROME: ICD-10-CM

## 2019-10-23 DIAGNOSIS — R07.82 INTERCOSTAL PAIN: ICD-10-CM

## 2019-10-23 DIAGNOSIS — E78.5 HYPERLIPIDEMIA, UNSPECIFIED HYPERLIPIDEMIA TYPE: ICD-10-CM

## 2019-10-23 DIAGNOSIS — I45.6 WPW (WOLFF-PARKINSON-WHITE SYNDROME): ICD-10-CM

## 2019-10-23 DIAGNOSIS — Z01.818 PRE-OP EXAM: Primary | ICD-10-CM

## 2019-10-23 NOTE — PROGRESS NOTES
[facility] [address], [address] [facility], [address] [place] [address]    Office [phone]           Sana Alicea is a 61 y.o. female presents for pre-operative cardiovascular evaluation      Assessment/Recommendations:    ICD-10-CM ICD-9-CM    1. Pre-op exam Z01.818 V72.84 AMB POC EKG ROUTINE W/ 12 LEADS, INTER & REP   2. Long Q-T syndrome I45.81 426.82    3. WPW (Fazal-Parkinson-White syndrome) I45.6 426.7    4. Hyperlipidemia, unspecified hyperlipidemia type E78.5 272.4    5. Intercostal pain R07.82 786.59      Patient currently has functional capcity > 4METS. She is without exertional chest pain symptoms, dyspnea on exertion. Rare chest pain symptoms with pulling heavy objects that last seconds. No heart failure symptoms. She previously had normal stress echo in 2017. No hx of prior obstructive CAD that required revascularization. Normal LV function on prior echocardiogram during stress testing. Recommend to proceed with ENT surgery without any further cardiovascular testing    - continue atenolol therapy      Primary Care Physician- Salazar Wheat MD    Follow-up 6 months    Subjective:  61 y.o. female presents for pre-operative cardiovascular examination. She is scheduled to have surgery tomorrow for correction of her deviated septum. Her chart reports history of WPW with palpitation symptoms. She reports having history of chest pain symptoms. Prior heart catheterization at [address] in the 1990s did not show any obstructive CAD. She had stress test in 2017 without evidence of inducible ischemia. She reports having some substernal chest pain when she pulls or lifts heavy objects. Pain comes one rarely and lasts seconds. She walks several miles daily without any chest pain symptoms or shortness of breath. She quit smoking one year ago. ECG in the office today shows normal rhythm with non-specific ST changes.     Past Medical History:   Diagnosis Date    Agoraphobia     Anxiety     Chronic obstructive pulmonary disease ([facility] [address]     Claustrophobia     History of mammography, screening 2012    Normal    Ill-defined condition     WPW    Ill-defined condition     speech impairment    Pap smear for cervical cancer screening several years    Psychiatric disorder     depression, anxiety    Stuttering     Tremors of nervous system     Fazal-Parkinson-White (WPW) pattern        Past Surgical History:   Procedure Laterality Date    HX HYSTERECTOMY           Current Outpatient Medications:     clonazePAM (KLONOPIN) 0.5 mg tablet, Take 0.5 Tabs by mouth two (2) times daily as needed (Anxiety). Max Daily Amount: 0.5 mg., Disp: 30 Tab, Rfl: 0    atenolol (TENORMIN) 25 mg tablet, Take 0.5 Tabs by mouth every morning., Disp: 45 Tab, Rfl: 1    cyclobenzaprine (FLEXERIL) 10 mg tablet, TAKE ONE TABLET BY MOUTH ONCE DAILY AS NEEDED FOR MUSCLE TENSION HEADACHE, Disp: 30 Tab, Rfl: 2    ergocalciferol (ERGOCALCIFEROL) 50,000 unit capsule, Take 1 Cap by mouth every seven (7) days. For 8 weeks and then once monthly., Disp: 12 Cap, Rfl: 3    ofloxacin (FLOXIN) 0.3 % otic solution, , Disp: , Rfl: 3    rizatriptan (MAXALT) 5 mg tablet, Take 5 mg by mouth once as needed for Migraine. May repeat in 2 hours if needed, Disp: , Rfl:     albuterol (PROVENTIL HFA) 90 mcg/actuation inhaler, Take 1 Puff by inhalation every six (6) hours as needed for Wheezing or Shortness of Breath., Disp: 1 Inhaler, Rfl: 1    gabapentin (NEURONTIN) 600 mg tablet, Take HALF tablet twice a day for 2 weeks, then increase to one tablet twice a day. To reduce tremors and headache frequency. (Patient not taking: Reported on 7/17/2019), Disp: 49 Tab, Rfl: 0    nitroglycerin (NITROSTAT) 0.4 mg SL tablet, 1 Tab by SubLINGual route every five (5) minutes as needed for Chest Pain. For 2 doses.   If cp is unrelieved after second dose call 911., Disp: 1 Bottle, Rfl: 1    Allergies   Allergen Reactions    Bacitracin Swelling    Bactrim [Sulfamethoprim Ds] Rash    Ciprofloxacin Swelling    Fosamax [Alendronate] Nausea Only    Gabapentin Other (comments)     Muscle spasms    Imitrex [Sumatriptan Succinate] Other (comments)     Suicidal ideation    Keflex [Cephalexin] Nausea and Vomiting    Pcn [Penicillins] Swelling    Primidone Other (comments)     Headache, nightmares    Trazodone Other (comments)     Nightmares        Family History   Problem Relation Age of Onset    Cancer Father         lung,  age 58    Alzheimer Father     Breast Cancer Paternal Aunt     Hypertension Mother     Alzheimer Mother     Osteoporosis Mother     COPD Mother     Arthritis-osteo Mother     Downs Syndrome Brother     No Known Problems Son     Obesity Daughter     Depression Daughter     Anesth Problems Neg Hx        Social History     Tobacco Use    Smoking status: Former Smoker     Packs/day: 1.00     Years: 25.00     Pack years: 25.00     Types: Cigarettes     Last attempt to quit: 2018     Years since quittin.8    Smokeless tobacco: Never Used    Tobacco comment: 8 cig a day    Substance Use Topics    Alcohol use: No     Alcohol/week: 0.0 standard drinks    Drug use: Not Currently     Types: Marijuana       Review of Symptoms:  Pertinent Positive: rare chest pain symptoms  Pertinent Negative:No  dyspnea on exertion, shortness of breath, orthopnea, PND    All Other systems reviewed and are negative for a Comprehensive ROS (10+)    Physical Exam    Blood pressure 128/80, pulse 86, resp. rate 16, height 5' 6\" (1.676 m), weight 124 lb 3.2 oz (56.3 kg), SpO2 96 %. Constitutional:  well-developed and well-nourished. No distress. HENT: Normocephalic. Eyes: No scleral icterus. Neck:  Neck supple. No JVD present. Pulmonary/Chest: Effort normal and breath sounds normal. No respiratory distress, wheezes or rales.   Cardiovascular: Normal rate, regular rhythm, S1 S2 . Exam reveals no gallop and no friction rub. No murmur heard. No edema. Extremities:  Normal muscle tone  Abdominal:   No abnormal distension. Neurological:  Moving all extremities, cranial nerves appear grossly intact. Skin: Skin is not cold. Not diaphoretic. No erythema. Psychiatric:  Grossly normal mood and affect. Intact insight. Objective Data:    ECG: personally reviewed and  intrepreted  10/23/2019- NSR, NSST changes    Stress echo 2017-   1) 7/10 chest pain before, during, and after the test   2) EKG uninterpretable due to resting changes. 3) Normal stress echo; Resting echo with normal regional and global contractility; Post exercise, uniformly enhanced myocardial contractility; with decreased LV cavity size demonstrated. Holter- 2018.   SVT, PVCs                Ravia Blue, DO

## 2019-10-23 NOTE — PROGRESS NOTES
Faxed office note to [address] at   Fax # [phone]    Patient is scheduled for Deviated Nasal Septum Wednesday, October 23, 2019

## 2019-10-23 NOTE — PROGRESS NOTES
Chief Complaint   Patient presents with    New Patient     Patient presents today as a new patient for Cardiac Clearance     Visit Vitals  /80 (BP 1 Location: Left arm, BP Patient Position: Sitting)   Pulse 86   Resp 16   Ht 5' 6\" (1.676 m)   Wt 124 lb 3.2 oz (56.3 kg)   SpO2 96%   BMI 20.05 kg/m²       Patient needs Cardiac Clearance for Deviated Nasal Septum on Wednesday, October 23, 2019  Fax # [phone]    Patient was seen previously by [address] [place] [address] Drive [phone]    Chest pain denied  SOB - denied  Dizziness denied  Swelling/Edema - denied  Recent hospital visit denied

## 2019-11-15 ENCOUNTER — OFFICE VISIT (OUTPATIENT)
Dept: FAMILY MEDICINE CLINIC | Age: 59
End: 2019-11-15

## 2019-11-15 VITALS
RESPIRATION RATE: 16 BRPM | HEIGHT: 66 IN | OXYGEN SATURATION: 97 % | HEART RATE: 100 BPM | SYSTOLIC BLOOD PRESSURE: 111 MMHG | BODY MASS INDEX: 19.61 KG/M2 | TEMPERATURE: 97.8 F | DIASTOLIC BLOOD PRESSURE: 75 MMHG | WEIGHT: 122 LBS

## 2019-11-15 DIAGNOSIS — F41.9 ANXIETY: Primary | ICD-10-CM

## 2019-11-15 DIAGNOSIS — Z79.899 CHRONIC PRESCRIPTION BENZODIAZEPINE USE: ICD-10-CM

## 2019-11-15 RX ORDER — CLONAZEPAM 0.5 MG/1
0.25 TABLET ORAL
Qty: 30 TAB | Refills: 0 | Status: SHIPPED | OUTPATIENT
Start: 2019-11-18 | End: 2019-12-18

## 2019-11-15 RX ORDER — ONDANSETRON 4 MG/1
4 TABLET, ORALLY DISINTEGRATING ORAL
COMMUNITY
Start: 2019-10-24

## 2019-11-15 RX ORDER — CLONAZEPAM 0.5 MG/1
0.5 TABLET ORAL
Qty: 30 TAB | Refills: 0 | Status: SHIPPED | OUTPATIENT
Start: 2019-12-18 | End: 2020-01-17

## 2019-11-15 RX ORDER — CLONAZEPAM 0.5 MG/1
0.5 TABLET ORAL
Qty: 30 TAB | Refills: 0 | Status: SHIPPED | OUTPATIENT
Start: 2020-01-17 | End: 2020-02-16

## 2019-11-15 RX ORDER — HYDROCODONE BITARTRATE AND ACETAMINOPHEN 5; 325 MG/1; MG/1
1-2 TABLET ORAL
COMMUNITY
Start: 2019-10-24 | End: 2022-09-22

## 2019-11-15 RX ORDER — NALOXONE HYDROCHLORIDE 4 MG/.1ML
SPRAY NASAL
Qty: 1 EACH | Refills: 1 | Status: SHIPPED | OUTPATIENT
Start: 2019-11-15

## 2019-11-15 NOTE — PROGRESS NOTES
Jayce Ferraro  61 y.o. female  1960  [facility]:[id number]    [facility]  Progress Note     Encounter Date: 11/15/2019    Assessment and Plan:     Encounter Diagnoses     ICD-10-CM ICD-9-CM   1. Anxiety F41.9 300.00   2. Chronic prescription benzodiazepine use Z79.899 V58.69       1. Anxiety  2. Chronic prescription benzodiazepine use  Informed patient that she needs to inform me in the future if she receives controlled substances from other providers. Given her current use of narcotic pain mediation from surgeon, I have written her a prescription for narcan and informed her potential for oversedation with combination of medication. - naloxone (NARCAN) 4 mg/actuation nasal spray; Use 1 spray intranasally, then discard. Repeat with new spray every 2 min as needed for opioid overdose symptoms, alternating nostrils. Dispense: 1 Each; Refill: 1  - COMPLIANCE DRUG SCREEN/PRESCRIPTION MONITORING; Future  - clonazePAM (KLONOPIN) 0.5 mg tablet; Take 0.5 Tabs by mouth two (2) times daily as needed (Anxiety) for up to 30 days. Max Daily Amount: 0.5 mg.  Dispense: 30 Tab; Refill: 0  - clonazePAM (KLONOPIN) 0.5 mg tablet; Take 1 Tab by mouth two (2) times daily as needed (Anxiety) for up to 30 days. Max Daily Amount: 1 mg. Dispense: 30 Tab; Refill: 0  - clonazePAM (KLONOPIN) 0.5 mg tablet; Take 1 Tab by mouth two (2) times daily as needed (Anxiety) for up to 30 days. Max Daily Amount: 1 mg. Dispense: 30 Tab; Refill: 0      I have discussed the diagnosis with the patient and the intended plan as seen in the above orders. she has expressed understanding. The patient has received an after-visit summary and questions were answered concerning future plans. I have discussed medication side effects and warnings with the patient as well.     Electronically Signed: Sonia Smith MD    Current Medications after this visit     Current Outpatient Medications   Medication Sig    HYDROcodone-acetaminophen (NORCO) 5-325 mg per tablet 1-2 Tabs.  naloxone (NARCAN) 4 mg/actuation nasal spray Use 1 spray intranasally, then discard. Repeat with new spray every 2 min as needed for opioid overdose symptoms, alternating nostrils.  [START ON 11/18/2019] clonazePAM (KLONOPIN) 0.5 mg tablet Take 0.5 Tabs by mouth two (2) times daily as needed (Anxiety) for up to 30 days. Max Daily Amount: 0.5 mg.    [START ON 12/18/2019] clonazePAM (KLONOPIN) 0.5 mg tablet Take 1 Tab by mouth two (2) times daily as needed (Anxiety) for up to 30 days. Max Daily Amount: 1 mg.    [START ON 1/17/2020] clonazePAM (KLONOPIN) 0.5 mg tablet Take 1 Tab by mouth two (2) times daily as needed (Anxiety) for up to 30 days. Max Daily Amount: 1 mg.  atenolol (TENORMIN) 25 mg tablet Take 0.5 Tabs by mouth every morning.  cyclobenzaprine (FLEXERIL) 10 mg tablet TAKE ONE TABLET BY MOUTH ONCE DAILY AS NEEDED FOR MUSCLE TENSION HEADACHE    ergocalciferol (ERGOCALCIFEROL) 50,000 unit capsule Take 1 Cap by mouth every seven (7) days. For 8 weeks and then once monthly.  ofloxacin (FLOXIN) 0.3 % otic solution     rizatriptan (MAXALT) 5 mg tablet Take 5 mg by mouth once as needed for Migraine. May repeat in 2 hours if needed    albuterol (PROVENTIL HFA) 90 mcg/actuation inhaler Take 1 Puff by inhalation every six (6) hours as needed for Wheezing or Shortness of Breath.  nitroglycerin (NITROSTAT) 0.4 mg SL tablet 1 Tab by SubLINGual route every five (5) minutes as needed for Chest Pain. For 2 doses. If cp is unrelieved after second dose call 911.  ondansetron (ZOFRAN ODT) 4 mg disintegrating tablet 4 mg. No current facility-administered medications for this visit.       Medications Discontinued During This Encounter   Medication Reason    gabapentin (NEURONTIN) 600 mg tablet Not A Current Medication    clonazePAM (KLONOPIN) 0.5 mg tablet Reorder     ~~~~~~~~~~~~~~~~~~~~~~~~~~~~~~~~~~~~~~~~~~~~~~    Chief Complaint   Patient presents with  Medication Evaluation     1 mos f/u        History provided by patient  History of Present Illness   Lali Dacosta is a 61 y.o. female who presents to clinic today for:    Anxiety/tremor Review:  Patient is seen for anxiety disorder. Patient reports that she does feel the clonopin 1/2 tab BID helps some days though other days she does not feel that it does not help. States that the holidays are particularly stressful for her. -  reviewed on 11/15/19; klonipin 5 mg #30 filled on 10/20/2019. Hydrocodone-APAP 5-325 mg #20.   - Pills brought to office as 8 pills remaining. Patient reports that she had septoplasty on 10/24/2019 and surgeon had given her prescription for hydrocodone following. Review of Systems   Review of Systems   Constitutional: Negative for chills and fever. See HPI for pertinent review of systems   HENT: Negative for congestion and nosebleeds. +nose pain. Respiratory: Negative for cough and hemoptysis. Cardiovascular: Negative for chest pain and palpitations. Neurological: Positive for tremors. Negative for dizziness, speech change, focal weakness, weakness and headaches. Psychiatric/Behavioral: Negative for depression, hallucinations and suicidal ideas. The patient is nervous/anxious and has insomnia. Vitals/Objective:     Vitals:    11/15/19 0922   BP: 111/75   Pulse: 100   Resp: 16   Temp: 97.8 °F (36.6 °C)   TempSrc: Oral   SpO2: 97%   Weight: 122 lb (55.3 kg)   Height: 5' 6\" (1.676 m)     Body mass index is 19.69 kg/m². Wt Readings from Last 3 Encounters:   11/15/19 122 lb (55.3 kg)   10/23/19 124 lb 3.2 oz (56.3 kg)   10/16/19 121 lb (54.9 kg)       Physical Exam   Constitutional: She is oriented to person, place, and time. She appears well-developed and well-nourished. HENT:   Head: Normocephalic and atraumatic. Right Ear: External ear normal.   Left Ear: External ear normal.   Cardiovascular: Normal rate and regular rhythm.    Pulmonary/Chest: Effort normal and breath sounds normal.   Neurological: She is alert and oriented to person, place, and time. She displays tremor. She displays no atrophy. No cranial nerve deficit. She exhibits normal muscle tone. She displays no seizure activity. No results found for this or any previous visit (from the past 24 hour(s)). Disposition     Follow-up and Dispositions  ·   Return in about 3 months (around 2/15/2020) for Routine (Chronic Conditions). Future Appointments   Date Time Provider Mary Elliott   3/24/2020  1:20 PM DO SAV CroftSCARROLL KING SCHED   2020  1:00 PM SS INF5 CH4 <1H [facility]       History   Patient's past medical, surgical and family histories were reviewed and updated.     Past Medical History:   Diagnosis Date    Agoraphobia     Anxiety     Chronic obstructive pulmonary disease ([address]     Claustrophobia     History of mammography, screening     Normal    Stuttering     Tremors of nervous system     Fazal-Parkinson-White (WPW) pattern      Past Surgical History:   Procedure Laterality Date    HX HYSTERECTOMY      HX SEPTOPLASTY  10/24/2019     Family History   Problem Relation Age of Onset    Cancer Father         lung,  age 58    Alzheimer Father     Breast Cancer Paternal Aunt     Hypertension Mother     Alzheimer Mother     Osteoporosis Mother     COPD Mother     Arthritis-osteo Mother     Downs Syndrome Brother     No Known Problems Son     Obesity Daughter     Depression Daughter     Anesth Problems Neg Hx      Social History     Tobacco Use    Smoking status: Former Smoker     Packs/day: 1.00     Years: 25.00     Pack years: 25.00     Types: Cigarettes     Last attempt to quit: 2018     Years since quittin.9    Smokeless tobacco: Never Used    Tobacco comment: 8 cig a day    Substance Use Topics    Alcohol use: No     Alcohol/week: 0.0 standard drinks    Drug use: Not Currently     Types: Marijuana Allergies     Allergies   Allergen Reactions    Bacitracin Swelling    Bactrim [Sulfamethoprim Ds] Rash    Ciprofloxacin Swelling    Fosamax [Alendronate] Nausea Only    Gabapentin Other (comments)     Muscle spasms    Imitrex [Sumatriptan Succinate] Other (comments)     Suicidal ideation    Keflex [Cephalexin] Nausea and Vomiting    Pcn [Penicillins] Swelling    Primidone Other (comments)     Headache, nightmares    Trazodone Other (comments)     Nightmares

## 2019-11-15 NOTE — PROGRESS NOTES
Identified pt with two pt identifiers(name and ). Reviewed record in preparation for visit and have obtained necessary documentation. Chief Complaint   Patient presents with    Medication Evaluation     1 mos f/u         Health Maintenance Due   Topic    Hepatitis C Screening     Shingrix Vaccine Age 50> (1 of 2)    FOBT Q 1 YEAR AGE 50-75        Coordination of Care Questionnaire:  :   1) Have you been to an emergency room, urgent care, or hospitalized since your last visit? If yes, where when, and reason for visit? Yes, [facility] for nose sugery      2. Have seen or consulted any other health care provider since your last visit? If yes, where when, and reason for visit? Yes, Cardiologist in [facility]      Patient is accompanied by self I have received verbal consent from Mel Glynn to discuss any/all medical information while they are present in the room.

## 2020-03-24 ENCOUNTER — DOCUMENTATION ONLY (OUTPATIENT)
Dept: CARDIOLOGY CLINIC | Age: 60
End: 2020-03-24

## 2020-03-24 NOTE — PROGRESS NOTES
Sent patient a my chart message ( phone numbers are not working) about her 3/27/20 appointment.   Patient has been rescheduled for 7/27/20 at 9:20 am.

## 2020-07-20 NOTE — CALL BACK NOTE
New/updated order required for patient's upcoming [facility] appointment. Faxed doctor's office on 07/20/20 at 8:23 AM.  Refer to fax documents in pharmacy for further information.

## 2020-07-23 NOTE — CALL BACK NOTE
New/updated order required for patient's upcoming [facility] appointment. Faxed doctor's office on 07/23/20 at 8:27 AM.  Refer to fax documents in pharmacy for further information.

## 2020-07-24 ENCOUNTER — TELEPHONE (OUTPATIENT)
Dept: ENDOCRINOLOGY | Age: 60
End: 2020-07-24

## 2020-07-24 NOTE — TELEPHONE ENCOUNTER
----- Message from Sam Rondno sent at 7/21/2020  3:51 PM EDT -----  Regarding: RE: transition patient from Dr. Mayo Lala  7/21/2020  3:51 PM        Still no luck with reaching Ms. Arya Guajardo.  ----- Message -----  From: Nydia Bullock MD  Sent: 7/21/2020  11:52 AM EDT  To: Sam Rondon  Subject: RE: transition patient from Dr. Patricia Burciaga.  Let me know if you get her.  EHSAN solomon.  ----- Message -----  From: Elle Esposito  Sent: 7/21/2020  11:46 AM EDT  To: Alyssa Vidal MD  Subject: RE: transition patient from Dr. Mayo Lala             7/21/2020  11:46 AM      Third time call, still no answer left a detailed VM to call me on my home phone.  ----- Message -----  From: Nydia Bullock MD  Sent: 7/20/2020   6:55 PM EDT  To: Sam Rondon  Subject: transition patient from Dr. Mayo Lala                 Can you call her and see if she can do a virtual visit at 4:10pm today as she is due for another reclast infusion next week and needs an office visit first.  If not, offer one of the holes on Wed (8:30, 10:10 or 3:50)

## 2020-07-27 ENCOUNTER — HOSPITAL ENCOUNTER (OUTPATIENT)
Dept: INFUSION THERAPY | Age: 60
Discharge: HOME | End: 2020-07-27

## 2022-03-18 PROBLEM — E03.9 ACQUIRED HYPOTHYROIDISM: Status: ACTIVE | Noted: 2017-03-24

## 2022-03-19 PROBLEM — H70.11 CHRONIC MASTOIDITIS, RIGHT EAR: Status: ACTIVE | Noted: 2019-03-28

## 2022-08-31 ENCOUNTER — HOSPITAL ENCOUNTER (OUTPATIENT)
Dept: PREADMISSION TESTING | Age: 62
Discharge: HOME | End: 2022-08-31

## 2022-08-31 NOTE — PROGRESS NOTES
Patient called x2, no answer.   called and was able to speak with pt. Patient to reschedule pat visit for tomorrow.

## 2022-09-01 ENCOUNTER — HOSPITAL ENCOUNTER (OUTPATIENT)
Dept: PREADMISSION TESTING | Age: 62
Discharge: HOME | End: 2022-09-01
Payer: COMMERCIAL

## 2022-09-01 ENCOUNTER — HOSPITAL ENCOUNTER (OUTPATIENT)
Dept: GENERAL RADIOLOGY | Age: 62
Discharge: HOME | End: 2022-09-01
Attending: ORTHOPAEDIC SURGERY
Payer: COMMERCIAL

## 2022-09-01 VITALS
TEMPERATURE: 98.5 F | OXYGEN SATURATION: 100 % | BODY MASS INDEX: 22.81 KG/M2 | SYSTOLIC BLOOD PRESSURE: 122 MMHG | HEIGHT: 64 IN | RESPIRATION RATE: 16 BRPM | WEIGHT: 133.6 LBS | DIASTOLIC BLOOD PRESSURE: 88 MMHG | HEART RATE: 94 BPM

## 2022-09-01 LAB
ABO + RH BLD: NORMAL
ANION GAP SERPL CALC-SCNC: 2 MMOL/L (ref 5–15)
APPEARANCE UR: CLEAR
APTT PPP: 32.2 SEC (ref 21.2–34.1)
ATRIAL RATE: 95 BPM
BACTERIA URNS QL MICRO: ABNORMAL /HPF
BILIRUB UR QL: NEGATIVE
BLOOD GROUP ANTIBODIES SERPL: NEGATIVE
BUN SERPL-MCNC: 12 MG/DL (ref 6–20)
BUN/CREAT SERPL: 20 (ref 12–20)
CA-I BLD-MCNC: 9 MG/DL (ref 8.5–10.1)
CALCULATED P AXIS, ECG09: 58 DEGREES
CALCULATED R AXIS, ECG10: 69 DEGREES
CALCULATED T AXIS, ECG11: 45 DEGREES
CHLORIDE SERPL-SCNC: 106 MMOL/L (ref 97–108)
CO2 SERPL-SCNC: 32 MMOL/L (ref 21–32)
COLOR UR: ABNORMAL
CREAT SERPL-MCNC: 0.59 MG/DL (ref 0.55–1.02)
DIAGNOSIS, 93000: NORMAL
ERYTHROCYTE [DISTWIDTH] IN BLOOD BY AUTOMATED COUNT: 12.3 % (ref 11.5–14.5)
GLUCOSE SERPL-MCNC: 98 MG/DL (ref 65–100)
GLUCOSE UR STRIP.AUTO-MCNC: NEGATIVE MG/DL
HCT VFR BLD AUTO: 40.9 % (ref 35–47)
HGB BLD-MCNC: 13.2 G/DL (ref 11.5–16)
HGB UR QL STRIP: NEGATIVE
INR PPP: 0.9 (ref 0.9–1.1)
KETONES UR QL STRIP.AUTO: NEGATIVE MG/DL
LEUKOCYTE ESTERASE UR QL STRIP.AUTO: NEGATIVE
MCH RBC QN AUTO: 30.4 PG (ref 26–34)
MCHC RBC AUTO-ENTMCNC: 32.3 G/DL (ref 30–36.5)
MCV RBC AUTO: 94.2 FL (ref 80–99)
MRSA DNA SPEC QL NAA+PROBE: NOT DETECTED
MUCOUS THREADS URNS QL MICRO: ABNORMAL /LPF
NITRITE UR QL STRIP.AUTO: NEGATIVE
NRBC # BLD: 0 K/UL (ref 0–0.01)
NRBC BLD-RTO: 0 PER 100 WBC
P-R INTERVAL, ECG05: 98 MS
PH UR STRIP: 5 [PH]
PLATELET # BLD AUTO: 280 K/UL (ref 150–400)
PMV BLD AUTO: 10.3 FL (ref 8.9–12.9)
POTASSIUM SERPL-SCNC: 4.3 MMOL/L (ref 3.5–5.1)
PROT UR STRIP-MCNC: NEGATIVE MG/DL
PROTHROMBIN TIME: 12.3 SEC (ref 11.9–14.6)
Q-T INTERVAL, ECG07: 414 MS
QRS DURATION, ECG06: 102 MS
QTC CALCULATION (BEZET), ECG08: 520 MS
RBC # BLD AUTO: 4.34 M/UL (ref 3.8–5.2)
RBC #/AREA URNS HPF: ABNORMAL /HPF (ref 0–5)
SODIUM SERPL-SCNC: 140 MMOL/L (ref 136–145)
SP GR UR REFRACTOMETRY: 1.01 (ref 1–1.03)
SPECIMEN EXP DATE BLD: NORMAL
THERAPEUTIC RANGE,PTTT: NORMAL SEC (ref 82–109)
UROBILINOGEN UR QL STRIP.AUTO: 0.1 EU/DL (ref 0.2–1)
VENTRICULAR RATE, ECG03: 95 BPM
WBC # BLD AUTO: 5.4 K/UL (ref 3.6–11)
WBC URNS QL MICRO: ABNORMAL /HPF (ref 0–4)

## 2022-09-01 PROCEDURE — 81001 URINALYSIS AUTO W/SCOPE: CPT

## 2022-09-01 PROCEDURE — 87086 URINE CULTURE/COLONY COUNT: CPT

## 2022-09-01 PROCEDURE — 85027 COMPLETE CBC AUTOMATED: CPT

## 2022-09-01 PROCEDURE — 87641 MR-STAPH DNA AMP PROBE: CPT

## 2022-09-01 PROCEDURE — 86900 BLOOD TYPING SEROLOGIC ABO: CPT

## 2022-09-01 PROCEDURE — 36415 COLL VENOUS BLD VENIPUNCTURE: CPT

## 2022-09-01 PROCEDURE — 93005 ELECTROCARDIOGRAM TRACING: CPT

## 2022-09-01 PROCEDURE — 80048 BASIC METABOLIC PNL TOTAL CA: CPT

## 2022-09-01 PROCEDURE — 71046 X-RAY EXAM CHEST 2 VIEWS: CPT

## 2022-09-01 PROCEDURE — 83036 HEMOGLOBIN GLYCOSYLATED A1C: CPT

## 2022-09-01 PROCEDURE — 85730 THROMBOPLASTIN TIME PARTIAL: CPT

## 2022-09-01 PROCEDURE — 85610 PROTHROMBIN TIME: CPT

## 2022-09-01 RX ORDER — LEVOTHYROXINE SODIUM 25 UG/1
25 TABLET ORAL
COMMUNITY

## 2022-09-01 RX ORDER — DICLOFENAC POTASSIUM 50 MG/1
50 TABLET, FILM COATED ORAL 3 TIMES DAILY
COMMUNITY
End: 2022-09-22

## 2022-09-01 RX ORDER — CLONAZEPAM 1 MG/1
1 TABLET ORAL 2 TIMES DAILY
COMMUNITY

## 2022-09-01 RX ORDER — ATORVASTATIN CALCIUM 10 MG/1
10 TABLET, FILM COATED ORAL DAILY
COMMUNITY

## 2022-09-01 RX ORDER — SENNOSIDES 25 MG/1
TABLET, FILM COATED ORAL
COMMUNITY
End: 2022-09-22

## 2022-09-01 RX ORDER — ACETAMINOPHEN AND CODEINE PHOSPHATE 300; 30 MG/1; MG/1
0.5 TABLET ORAL 3 TIMES DAILY
COMMUNITY
End: 2022-09-22

## 2022-09-01 NOTE — PERIOP NOTES
1500 Dr. Cyndi Lopez called, made aware of pt's history and ekg done today in PAT dept. No new orders given, stated ok to proceed with surgery as planned.

## 2022-09-03 LAB
BACTERIA SPEC CULT: NORMAL
EST. AVERAGE GLUCOSE BLD GHB EST-MCNC: 108 MG/DL
HBA1C MFR BLD: 5.4 % (ref 4–5.6)
SPECIAL REQUESTS,SREQ: NORMAL

## 2022-09-20 ENCOUNTER — APPOINTMENT (OUTPATIENT)
Dept: GENERAL RADIOLOGY | Age: 62
DRG: 455 | End: 2022-09-20
Attending: PHYSICIAN ASSISTANT
Payer: MEDICARE

## 2022-09-20 ENCOUNTER — HOSPITAL ENCOUNTER (INPATIENT)
Age: 62
LOS: 2 days | Discharge: HOME | DRG: 455 | End: 2022-09-22
Attending: ORTHOPAEDIC SURGERY | Admitting: ORTHOPAEDIC SURGERY
Payer: MEDICARE

## 2022-09-20 ENCOUNTER — ANESTHESIA (OUTPATIENT)
Dept: SURGERY | Age: 62
DRG: 455 | End: 2022-09-20
Payer: MEDICARE

## 2022-09-20 ENCOUNTER — ANESTHESIA EVENT (OUTPATIENT)
Dept: SURGERY | Age: 62
DRG: 455 | End: 2022-09-20
Payer: MEDICARE

## 2022-09-20 ENCOUNTER — APPOINTMENT (OUTPATIENT)
Dept: GENERAL RADIOLOGY | Age: 62
DRG: 455 | End: 2022-09-20
Attending: ORTHOPAEDIC SURGERY
Payer: MEDICARE

## 2022-09-20 DIAGNOSIS — R25.1 TREMOR: ICD-10-CM

## 2022-09-20 DIAGNOSIS — M48.061 SPINAL STENOSIS OF LUMBAR REGION WITHOUT NEUROGENIC CLAUDICATION: Primary | ICD-10-CM

## 2022-09-20 PROBLEM — M41.56 SCOLIOSIS OF LUMBAR REGION DUE TO DEGENERATIVE DISEASE OF SPINE IN ADULT: Status: ACTIVE | Noted: 2022-09-20

## 2022-09-20 LAB
GLUCOSE BLD STRIP.AUTO-MCNC: 102 MG/DL (ref 65–100)
PERFORMED BY, TECHID: ABNORMAL

## 2022-09-20 PROCEDURE — 76210000006 HC OR PH I REC 0.5 TO 1 HR: Performed by: ORTHOPAEDIC SURGERY

## 2022-09-20 PROCEDURE — 82962 GLUCOSE BLOOD TEST: CPT

## 2022-09-20 PROCEDURE — 74011000250 HC RX REV CODE- 250: Performed by: ORTHOPAEDIC SURGERY

## 2022-09-20 PROCEDURE — 74011250636 HC RX REV CODE- 250/636: Performed by: ANESTHESIOLOGY

## 2022-09-20 PROCEDURE — 2709999900 HC NON-CHARGEABLE SUPPLY: Performed by: ORTHOPAEDIC SURGERY

## 2022-09-20 PROCEDURE — 77030031139 HC SUT VCRL2 J&J -A: Performed by: ORTHOPAEDIC SURGERY

## 2022-09-20 PROCEDURE — 74011000250 HC RX REV CODE- 250

## 2022-09-20 PROCEDURE — 74011000258 HC RX REV CODE- 258

## 2022-09-20 PROCEDURE — 0SG10A0 FUSION OF 2 OR MORE LUMBAR VERTEBRAL JOINTS WITH INTERBODY FUSION DEVICE, ANTERIOR APPROACH, ANTERIOR COLUMN, OPEN APPROACH: ICD-10-PCS | Performed by: ORTHOPAEDIC SURGERY

## 2022-09-20 PROCEDURE — 74011000250 HC RX REV CODE- 250: Performed by: PHYSICIAN ASSISTANT

## 2022-09-20 PROCEDURE — C1889 IMPLANT/INSERT DEVICE, NOC: HCPCS | Performed by: ORTHOPAEDIC SURGERY

## 2022-09-20 PROCEDURE — 74011250637 HC RX REV CODE- 250/637: Performed by: ORTHOPAEDIC SURGERY

## 2022-09-20 PROCEDURE — 77030005515 HC CATH URETH FOL14 BARD -B: Performed by: ORTHOPAEDIC SURGERY

## 2022-09-20 PROCEDURE — C1713 ANCHOR/SCREW BN/BN,TIS/BN: HCPCS | Performed by: ORTHOPAEDIC SURGERY

## 2022-09-20 PROCEDURE — 74011250636 HC RX REV CODE- 250/636

## 2022-09-20 PROCEDURE — 77030020061 HC IV BLD WRMR ADMIN SET 3M -B: Performed by: ANESTHESIOLOGY

## 2022-09-20 PROCEDURE — 77030008684 HC TU ET CUF COVD -B: Performed by: ANESTHESIOLOGY

## 2022-09-20 PROCEDURE — 77030038156 HC CRD BPLR DISP CARF -A: Performed by: ORTHOPAEDIC SURGERY

## 2022-09-20 PROCEDURE — 77030013079 HC BLNKT BAIR HGGR 3M -A: Performed by: ANESTHESIOLOGY

## 2022-09-20 PROCEDURE — 76060000039 HC ANESTHESIA 4 TO 4.5 HR: Performed by: ORTHOPAEDIC SURGERY

## 2022-09-20 PROCEDURE — 76000 FLUOROSCOPY <1 HR PHYS/QHP: CPT

## 2022-09-20 PROCEDURE — 72100 X-RAY EXAM L-S SPINE 2/3 VWS: CPT

## 2022-09-20 PROCEDURE — 74011250636 HC RX REV CODE- 250/636: Performed by: ORTHOPAEDIC SURGERY

## 2022-09-20 PROCEDURE — 65270000029 HC RM PRIVATE

## 2022-09-20 PROCEDURE — C1769 GUIDE WIRE: HCPCS | Performed by: ORTHOPAEDIC SURGERY

## 2022-09-20 PROCEDURE — 0ST20ZZ RESECTION OF LUMBAR VERTEBRAL DISC, OPEN APPROACH: ICD-10-PCS | Performed by: ORTHOPAEDIC SURGERY

## 2022-09-20 PROCEDURE — 77030018673: Performed by: ORTHOPAEDIC SURGERY

## 2022-09-20 PROCEDURE — 77030028271 HC SRGFL HEMSTAT MTRX KT J&J -C: Performed by: ORTHOPAEDIC SURGERY

## 2022-09-20 PROCEDURE — 77030011264 HC ELECTRD BLD EXT COVD -A: Performed by: ORTHOPAEDIC SURGERY

## 2022-09-20 PROCEDURE — 74011250637 HC RX REV CODE- 250/637: Performed by: PHYSICIAN ASSISTANT

## 2022-09-20 PROCEDURE — 77030019908 HC STETH ESOPH SIMS -A: Performed by: ANESTHESIOLOGY

## 2022-09-20 PROCEDURE — 76010000175 HC OR TIME 4 TO 4.5 HR INTENSV-TIER 1: Performed by: ORTHOPAEDIC SURGERY

## 2022-09-20 PROCEDURE — 74011000250 HC RX REV CODE- 250: Performed by: ANESTHESIOLOGY

## 2022-09-20 PROCEDURE — 77030002933 HC SUT MCRYL J&J -A: Performed by: ORTHOPAEDIC SURGERY

## 2022-09-20 PROCEDURE — 77030039711 HC GRFT BNE MATR VIVIGEN LIFV 1.0CC/0.5CC: Performed by: ORTHOPAEDIC SURGERY

## 2022-09-20 PROCEDURE — 77030026438 HC STYL ET INTUB CARD -A: Performed by: ANESTHESIOLOGY

## 2022-09-20 PROCEDURE — 87086 URINE CULTURE/COLONY COUNT: CPT

## 2022-09-20 PROCEDURE — 77030008480 HC STYL W/CAP J&J -C: Performed by: ORTHOPAEDIC SURGERY

## 2022-09-20 PROCEDURE — 74011250636 HC RX REV CODE- 250/636: Performed by: PHYSICIAN ASSISTANT

## 2022-09-20 PROCEDURE — 0SG10J1 FUSION OF 2 OR MORE LUMBAR VERTEBRAL JOINTS WITH SYNTHETIC SUBSTITUTE, POSTERIOR APPROACH, POSTERIOR COLUMN, OPEN APPROACH: ICD-10-PCS | Performed by: ORTHOPAEDIC SURGERY

## 2022-09-20 PROCEDURE — 77030040361 HC SLV COMPR DVT MDII -B: Performed by: ORTHOPAEDIC SURGERY

## 2022-09-20 PROCEDURE — 77030041237 HC BLNKT WARM MDII -A: Performed by: ORTHOPAEDIC SURGERY

## 2022-09-20 PROCEDURE — 77030034479 HC ADH SKN CLSR PRINEO J&J -B: Performed by: ORTHOPAEDIC SURGERY

## 2022-09-20 DEVICE — SET SCR SPNL TI SGL INNR FOR VIPER 2 MINIMALLY INVASIVE: Type: IMPLANTABLE DEVICE | Site: SPINE LUMBAR | Status: FUNCTIONAL

## 2022-09-20 DEVICE — IMPLANTABLE DEVICE: Type: IMPLANTABLE DEVICE | Site: SPINE LUMBAR | Status: FUNCTIONAL

## 2022-09-20 DEVICE — GRAFT BNE SUB M 5ML CANC DBM FRMBL CELLULAR VIVIGEN: Type: IMPLANTABLE DEVICE | Site: SPINE LUMBAR | Status: FUNCTIONAL

## 2022-09-20 DEVICE — GRAFT BNE SUB SM 1ML CRYOPRESERVED VIABLE CORT CANC BNE: Type: IMPLANTABLE DEVICE | Site: SPINE LUMBAR | Status: FUNCTIONAL

## 2022-09-20 DEVICE — ROD SPNL L110MM DIA5.5MM POST PEDCL TI LORDOSED VIPER 2: Type: IMPLANTABLE DEVICE | Site: SPINE LUMBAR | Status: FUNCTIONAL

## 2022-09-20 DEVICE — SCREW SPNL L45MM DIA6MM TI POLYAX EXT TAB FOR MINIMALLY: Type: IMPLANTABLE DEVICE | Site: SPINE LUMBAR | Status: FUNCTIONAL

## 2022-09-20 RX ORDER — FENTANYL CITRATE 50 UG/ML
25 INJECTION, SOLUTION INTRAMUSCULAR; INTRAVENOUS
Status: DISCONTINUED | OUTPATIENT
Start: 2022-09-20 | End: 2022-09-20 | Stop reason: HOSPADM

## 2022-09-20 RX ORDER — DEXAMETHASONE SODIUM PHOSPHATE 4 MG/ML
INJECTION, SOLUTION INTRA-ARTICULAR; INTRALESIONAL; INTRAMUSCULAR; INTRAVENOUS; SOFT TISSUE AS NEEDED
Status: DISCONTINUED | OUTPATIENT
Start: 2022-09-20 | End: 2022-09-20 | Stop reason: HOSPADM

## 2022-09-20 RX ORDER — NALOXONE HYDROCHLORIDE 0.4 MG/ML
0.4 INJECTION, SOLUTION INTRAMUSCULAR; INTRAVENOUS; SUBCUTANEOUS AS NEEDED
Status: DISCONTINUED | OUTPATIENT
Start: 2022-09-20 | End: 2022-09-22 | Stop reason: HOSPADM

## 2022-09-20 RX ORDER — SODIUM CHLORIDE 0.9 % (FLUSH) 0.9 %
5-40 SYRINGE (ML) INJECTION AS NEEDED
Status: DISCONTINUED | OUTPATIENT
Start: 2022-09-20 | End: 2022-09-20 | Stop reason: HOSPADM

## 2022-09-20 RX ORDER — SODIUM CHLORIDE 0.9 % (FLUSH) 0.9 %
5-40 SYRINGE (ML) INJECTION AS NEEDED
Status: CANCELLED | OUTPATIENT
Start: 2022-09-20

## 2022-09-20 RX ORDER — MORPHINE SULFATE 2 MG/ML
2 INJECTION, SOLUTION INTRAMUSCULAR; INTRAVENOUS
Status: DISPENSED | OUTPATIENT
Start: 2022-09-20 | End: 2022-09-21

## 2022-09-20 RX ORDER — CELECOXIB 200 MG/1
400 CAPSULE ORAL ONCE
Status: COMPLETED | OUTPATIENT
Start: 2022-09-20 | End: 2022-09-20

## 2022-09-20 RX ORDER — ATENOLOL 25 MG/1
25 TABLET ORAL
Status: DISCONTINUED | OUTPATIENT
Start: 2022-09-21 | End: 2022-09-22 | Stop reason: HOSPADM

## 2022-09-20 RX ORDER — FENTANYL CITRATE 50 UG/ML
INJECTION, SOLUTION INTRAMUSCULAR; INTRAVENOUS AS NEEDED
Status: DISCONTINUED | OUTPATIENT
Start: 2022-09-20 | End: 2022-09-20 | Stop reason: HOSPADM

## 2022-09-20 RX ORDER — REMIFENTANIL HYDROCHLORIDE 1 MG/ML
INJECTION, POWDER, LYOPHILIZED, FOR SOLUTION INTRAVENOUS
Status: DISCONTINUED | OUTPATIENT
Start: 2022-09-20 | End: 2022-09-20 | Stop reason: HOSPADM

## 2022-09-20 RX ORDER — OXYCODONE HYDROCHLORIDE 10 MG/1
10 TABLET ORAL
Status: DISCONTINUED | OUTPATIENT
Start: 2022-09-20 | End: 2022-09-22 | Stop reason: HOSPADM

## 2022-09-20 RX ORDER — SODIUM CHLORIDE 0.9 % (FLUSH) 0.9 %
5-40 SYRINGE (ML) INJECTION AS NEEDED
Status: DISCONTINUED | OUTPATIENT
Start: 2022-09-20 | End: 2022-09-22 | Stop reason: HOSPADM

## 2022-09-20 RX ORDER — MORPHINE SULFATE 10 MG/ML
2 INJECTION, SOLUTION INTRAMUSCULAR; INTRAVENOUS
Status: DISCONTINUED | OUTPATIENT
Start: 2022-09-20 | End: 2022-09-20 | Stop reason: CLARIF

## 2022-09-20 RX ORDER — ONDANSETRON 4 MG/1
4 TABLET, ORALLY DISINTEGRATING ORAL
Status: DISCONTINUED | OUTPATIENT
Start: 2022-09-20 | End: 2022-09-22 | Stop reason: HOSPADM

## 2022-09-20 RX ORDER — DEXMEDETOMIDINE HYDROCHLORIDE 100 UG/ML
INJECTION, SOLUTION INTRAVENOUS AS NEEDED
Status: DISCONTINUED | OUTPATIENT
Start: 2022-09-20 | End: 2022-09-20 | Stop reason: HOSPADM

## 2022-09-20 RX ORDER — ERGOCALCIFEROL 1.25 MG/1
50000 CAPSULE ORAL
Status: DISCONTINUED | OUTPATIENT
Start: 2022-09-20 | End: 2022-09-20

## 2022-09-20 RX ORDER — GLYCOPYRROLATE 0.2 MG/ML
INJECTION INTRAMUSCULAR; INTRAVENOUS AS NEEDED
Status: DISCONTINUED | OUTPATIENT
Start: 2022-09-20 | End: 2022-09-20 | Stop reason: HOSPADM

## 2022-09-20 RX ORDER — ALBUTEROL SULFATE 90 UG/1
1 AEROSOL, METERED RESPIRATORY (INHALATION)
Status: DISCONTINUED | OUTPATIENT
Start: 2022-09-20 | End: 2022-09-22 | Stop reason: HOSPADM

## 2022-09-20 RX ORDER — OXYCODONE HYDROCHLORIDE 5 MG/1
5 TABLET ORAL
Status: DISCONTINUED | OUTPATIENT
Start: 2022-09-20 | End: 2022-09-22 | Stop reason: HOSPADM

## 2022-09-20 RX ORDER — CYCLOBENZAPRINE HCL 10 MG
7.5 TABLET ORAL 2 TIMES DAILY
Status: DISCONTINUED | OUTPATIENT
Start: 2022-09-20 | End: 2022-09-22 | Stop reason: HOSPADM

## 2022-09-20 RX ORDER — FACIAL-BODY WIPES
10 EACH TOPICAL DAILY PRN
Status: DISCONTINUED | OUTPATIENT
Start: 2022-09-22 | End: 2022-09-22 | Stop reason: HOSPADM

## 2022-09-20 RX ORDER — POLYETHYLENE GLYCOL 3350 17 G/17G
17 POWDER, FOR SOLUTION ORAL DAILY
Status: DISCONTINUED | OUTPATIENT
Start: 2022-09-21 | End: 2022-09-22 | Stop reason: HOSPADM

## 2022-09-20 RX ORDER — ROCURONIUM BROMIDE 10 MG/ML
INJECTION, SOLUTION INTRAVENOUS AS NEEDED
Status: DISCONTINUED | OUTPATIENT
Start: 2022-09-20 | End: 2022-09-20 | Stop reason: HOSPADM

## 2022-09-20 RX ORDER — SODIUM CHLORIDE 9 MG/ML
125 INJECTION, SOLUTION INTRAVENOUS CONTINUOUS
Status: DISPENSED | OUTPATIENT
Start: 2022-09-20 | End: 2022-09-21

## 2022-09-20 RX ORDER — SODIUM CHLORIDE, SODIUM LACTATE, POTASSIUM CHLORIDE, CALCIUM CHLORIDE 600; 310; 30; 20 MG/100ML; MG/100ML; MG/100ML; MG/100ML
20 INJECTION, SOLUTION INTRAVENOUS CONTINUOUS
Status: DISCONTINUED | OUTPATIENT
Start: 2022-09-20 | End: 2022-09-20

## 2022-09-20 RX ORDER — NITROGLYCERIN 0.4 MG/1
0.4 TABLET SUBLINGUAL
Status: DISCONTINUED | OUTPATIENT
Start: 2022-09-20 | End: 2022-09-22 | Stop reason: HOSPADM

## 2022-09-20 RX ORDER — SODIUM CHLORIDE 0.9 % (FLUSH) 0.9 %
5-40 SYRINGE (ML) INJECTION EVERY 8 HOURS
Status: CANCELLED | OUTPATIENT
Start: 2022-09-20

## 2022-09-20 RX ORDER — AMOXICILLIN 250 MG
1 CAPSULE ORAL 2 TIMES DAILY
Status: DISCONTINUED | OUTPATIENT
Start: 2022-09-20 | End: 2022-09-22 | Stop reason: HOSPADM

## 2022-09-20 RX ORDER — BUPIVACAINE HYDROCHLORIDE 2.5 MG/ML
INJECTION, SOLUTION EPIDURAL; INFILTRATION; INTRACAUDAL AS NEEDED
Status: DISCONTINUED | OUTPATIENT
Start: 2022-09-20 | End: 2022-09-20 | Stop reason: HOSPADM

## 2022-09-20 RX ORDER — SODIUM CHLORIDE 0.9 % (FLUSH) 0.9 %
5-40 SYRINGE (ML) INJECTION EVERY 8 HOURS
Status: DISCONTINUED | OUTPATIENT
Start: 2022-09-20 | End: 2022-09-20 | Stop reason: HOSPADM

## 2022-09-20 RX ORDER — PROPOFOL 10 MG/ML
VIAL (ML) INTRAVENOUS
Status: DISCONTINUED | OUTPATIENT
Start: 2022-09-20 | End: 2022-09-20 | Stop reason: HOSPADM

## 2022-09-20 RX ORDER — LIDOCAINE HYDROCHLORIDE 20 MG/ML
INJECTION, SOLUTION EPIDURAL; INFILTRATION; INTRACAUDAL; PERINEURAL AS NEEDED
Status: DISCONTINUED | OUTPATIENT
Start: 2022-09-20 | End: 2022-09-20 | Stop reason: HOSPADM

## 2022-09-20 RX ORDER — SODIUM CHLORIDE, SODIUM LACTATE, POTASSIUM CHLORIDE, CALCIUM CHLORIDE 600; 310; 30; 20 MG/100ML; MG/100ML; MG/100ML; MG/100ML
INJECTION, SOLUTION INTRAVENOUS
Status: DISCONTINUED | OUTPATIENT
Start: 2022-09-20 | End: 2022-09-20 | Stop reason: HOSPADM

## 2022-09-20 RX ORDER — CLINDAMYCIN PHOSPHATE 600 MG/50ML
600 INJECTION INTRAVENOUS ONCE
Status: COMPLETED | OUTPATIENT
Start: 2022-09-20 | End: 2022-09-20

## 2022-09-20 RX ORDER — HYDROMORPHONE HYDROCHLORIDE 1 MG/ML
1 INJECTION, SOLUTION INTRAMUSCULAR; INTRAVENOUS; SUBCUTANEOUS ONCE
Status: COMPLETED | OUTPATIENT
Start: 2022-09-20 | End: 2022-09-20

## 2022-09-20 RX ORDER — SUCCINYLCHOLINE CHLORIDE 20 MG/ML
INJECTION INTRAMUSCULAR; INTRAVENOUS AS NEEDED
Status: DISCONTINUED | OUTPATIENT
Start: 2022-09-20 | End: 2022-09-20 | Stop reason: HOSPADM

## 2022-09-20 RX ORDER — MIDAZOLAM HYDROCHLORIDE 1 MG/ML
INJECTION, SOLUTION INTRAMUSCULAR; INTRAVENOUS AS NEEDED
Status: DISCONTINUED | OUTPATIENT
Start: 2022-09-20 | End: 2022-09-20 | Stop reason: HOSPADM

## 2022-09-20 RX ORDER — HYDROCODONE BITARTRATE AND ACETAMINOPHEN 5; 325 MG/1; MG/1
2 TABLET ORAL
Status: DISCONTINUED | OUTPATIENT
Start: 2022-09-20 | End: 2022-09-20

## 2022-09-20 RX ORDER — PROPOFOL 10 MG/ML
INJECTION, EMULSION INTRAVENOUS AS NEEDED
Status: DISCONTINUED | OUTPATIENT
Start: 2022-09-20 | End: 2022-09-20 | Stop reason: HOSPADM

## 2022-09-20 RX ORDER — SODIUM CHLORIDE 0.9 % (FLUSH) 0.9 %
5-40 SYRINGE (ML) INJECTION EVERY 8 HOURS
Status: DISCONTINUED | OUTPATIENT
Start: 2022-09-20 | End: 2022-09-22 | Stop reason: HOSPADM

## 2022-09-20 RX ORDER — HYDROCODONE BITARTRATE AND ACETAMINOPHEN 5; 325 MG/1; MG/1
1 TABLET ORAL
Status: DISCONTINUED | OUTPATIENT
Start: 2022-09-20 | End: 2022-09-20

## 2022-09-20 RX ORDER — HYDROMORPHONE HYDROCHLORIDE 1 MG/ML
INJECTION, SOLUTION INTRAMUSCULAR; INTRAVENOUS; SUBCUTANEOUS AS NEEDED
Status: DISCONTINUED | OUTPATIENT
Start: 2022-09-20 | End: 2022-09-20 | Stop reason: HOSPADM

## 2022-09-20 RX ORDER — CLONAZEPAM 1 MG/1
1 TABLET ORAL 2 TIMES DAILY
Status: DISCONTINUED | OUTPATIENT
Start: 2022-09-20 | End: 2022-09-22 | Stop reason: HOSPADM

## 2022-09-20 RX ORDER — LEVOTHYROXINE SODIUM 25 UG/1
25 TABLET ORAL
Status: DISCONTINUED | OUTPATIENT
Start: 2022-09-21 | End: 2022-09-22 | Stop reason: HOSPADM

## 2022-09-20 RX ORDER — ATORVASTATIN CALCIUM 20 MG/1
10 TABLET, FILM COATED ORAL DAILY
Status: DISCONTINUED | OUTPATIENT
Start: 2022-09-21 | End: 2022-09-22 | Stop reason: HOSPADM

## 2022-09-20 RX ORDER — HYDROMORPHONE HYDROCHLORIDE 1 MG/ML
0.5 INJECTION, SOLUTION INTRAMUSCULAR; INTRAVENOUS; SUBCUTANEOUS
Status: DISCONTINUED | OUTPATIENT
Start: 2022-09-20 | End: 2022-09-20 | Stop reason: HOSPADM

## 2022-09-20 RX ORDER — ONDANSETRON 2 MG/ML
INJECTION INTRAMUSCULAR; INTRAVENOUS AS NEEDED
Status: DISCONTINUED | OUTPATIENT
Start: 2022-09-20 | End: 2022-09-20 | Stop reason: HOSPADM

## 2022-09-20 RX ADMIN — SUGAMMADEX 50 MG: 100 INJECTION, SOLUTION INTRAVENOUS at 11:40

## 2022-09-20 RX ADMIN — SENNOSIDES AND DOCUSATE SODIUM 1 TABLET: 50; 8.6 TABLET ORAL at 20:57

## 2022-09-20 RX ADMIN — FENTANYL CITRATE 50 MCG: 50 INJECTION, SOLUTION INTRAMUSCULAR; INTRAVENOUS at 14:49

## 2022-09-20 RX ADMIN — ROCURONIUM BROMIDE 20 MG: 10 INJECTION, SOLUTION INTRAVENOUS at 13:20

## 2022-09-20 RX ADMIN — DEXMEDETOMIDINE HYDROCHLORIDE 6 MCG: 100 INJECTION, SOLUTION INTRAVENOUS at 12:05

## 2022-09-20 RX ADMIN — HYDROMORPHONE HYDROCHLORIDE 0.5 MG: 1 INJECTION, SOLUTION INTRAMUSCULAR; INTRAVENOUS; SUBCUTANEOUS at 11:44

## 2022-09-20 RX ADMIN — DEXMEDETOMIDINE HYDROCHLORIDE 2 MCG: 100 INJECTION, SOLUTION INTRAVENOUS at 12:39

## 2022-09-20 RX ADMIN — GLYCOPYRROLATE 0.2 MG: 0.2 INJECTION INTRAMUSCULAR; INTRAVENOUS at 11:43

## 2022-09-20 RX ADMIN — CELECOXIB 400 MG: 200 CAPSULE ORAL at 10:29

## 2022-09-20 RX ADMIN — SODIUM CHLORIDE, POTASSIUM CHLORIDE, SODIUM LACTATE AND CALCIUM CHLORIDE 20 ML/HR: 600; 310; 30; 20 INJECTION, SOLUTION INTRAVENOUS at 10:28

## 2022-09-20 RX ADMIN — CLINDAMYCIN PHOSPHATE 600 MG: 600 INJECTION, SOLUTION INTRAVENOUS at 11:07

## 2022-09-20 RX ADMIN — FENTANYL CITRATE 50 MCG: 50 INJECTION, SOLUTION INTRAMUSCULAR; INTRAVENOUS at 10:57

## 2022-09-20 RX ADMIN — SUGAMMADEX 150 MG: 100 INJECTION, SOLUTION INTRAVENOUS at 14:58

## 2022-09-20 RX ADMIN — HYDROMORPHONE HYDROCHLORIDE 1 MG: 1 INJECTION, SOLUTION INTRAMUSCULAR; INTRAVENOUS; SUBCUTANEOUS at 22:25

## 2022-09-20 RX ADMIN — ONDANSETRON 4 MG: 2 INJECTION INTRAMUSCULAR; INTRAVENOUS at 14:33

## 2022-09-20 RX ADMIN — PHENYLEPHRINE HYDROCHLORIDE 20 MCG/MIN: 10 INJECTION INTRAVENOUS at 11:10

## 2022-09-20 RX ADMIN — MIDAZOLAM HYDROCHLORIDE 2 MG: 2 INJECTION, SOLUTION INTRAMUSCULAR; INTRAVENOUS at 10:50

## 2022-09-20 RX ADMIN — SODIUM CHLORIDE, PRESERVATIVE FREE 10 ML: 5 INJECTION INTRAVENOUS at 17:03

## 2022-09-20 RX ADMIN — ROCURONIUM BROMIDE 15 MG: 10 INJECTION, SOLUTION INTRAVENOUS at 10:57

## 2022-09-20 RX ADMIN — DEXMEDETOMIDINE HYDROCHLORIDE 6 MCG: 100 INJECTION, SOLUTION INTRAVENOUS at 12:50

## 2022-09-20 RX ADMIN — FENTANYL CITRATE 50 MCG: 50 INJECTION, SOLUTION INTRAMUSCULAR; INTRAVENOUS at 11:09

## 2022-09-20 RX ADMIN — GLYCOPYRROLATE 0.2 MG: 0.2 INJECTION INTRAMUSCULAR; INTRAVENOUS at 12:40

## 2022-09-20 RX ADMIN — DEXAMETHASONE SODIUM PHOSPHATE 4 MG: 4 INJECTION, SOLUTION INTRA-ARTICULAR; INTRALESIONAL; INTRAMUSCULAR; INTRAVENOUS; SOFT TISSUE at 11:07

## 2022-09-20 RX ADMIN — HYDROCODONE BITARTRATE AND ACETAMINOPHEN 2 TABLET: 5; 325 TABLET ORAL at 20:53

## 2022-09-20 RX ADMIN — SODIUM CHLORIDE 125 ML/HR: 9 INJECTION, SOLUTION INTRAVENOUS at 15:46

## 2022-09-20 RX ADMIN — HYDROCODONE BITARTRATE AND ACETAMINOPHEN 1 TABLET: 5; 325 TABLET ORAL at 16:49

## 2022-09-20 RX ADMIN — DEXMEDETOMIDINE HYDROCHLORIDE 10 MCG: 100 INJECTION, SOLUTION INTRAVENOUS at 14:33

## 2022-09-20 RX ADMIN — FENTANYL CITRATE 50 MCG: 50 INJECTION, SOLUTION INTRAMUSCULAR; INTRAVENOUS at 14:55

## 2022-09-20 RX ADMIN — SUCCINYLCHOLINE CHLORIDE 120 MG: 20 INJECTION, SOLUTION INTRAMUSCULAR; INTRAVENOUS at 10:57

## 2022-09-20 RX ADMIN — MORPHINE SULFATE 2 MG: 2 INJECTION, SOLUTION INTRAMUSCULAR; INTRAVENOUS at 18:50

## 2022-09-20 RX ADMIN — REMIFENTANIL HYDROCHLORIDE 0.2 MCG/KG/MIN: 1 INJECTION, POWDER, LYOPHILIZED, FOR SOLUTION INTRAVENOUS at 11:59

## 2022-09-20 RX ADMIN — SODIUM CHLORIDE, POTASSIUM CHLORIDE, SODIUM LACTATE AND CALCIUM CHLORIDE: 600; 310; 30; 20 INJECTION, SOLUTION INTRAVENOUS at 10:50

## 2022-09-20 RX ADMIN — HYDROMORPHONE HYDROCHLORIDE 0.5 MG: 1 INJECTION, SOLUTION INTRAMUSCULAR; INTRAVENOUS; SUBCUTANEOUS at 14:31

## 2022-09-20 RX ADMIN — PHENYLEPHRINE HYDROCHLORIDE 50 MCG: 10 INJECTION INTRAVENOUS at 12:26

## 2022-09-20 RX ADMIN — PHENYLEPHRINE HYDROCHLORIDE 150 MCG: 10 INJECTION INTRAVENOUS at 12:40

## 2022-09-20 RX ADMIN — LIDOCAINE HYDROCHLORIDE 60 MG: 20 INJECTION, SOLUTION EPIDURAL; INFILTRATION; INTRACAUDAL; PERINEURAL at 10:57

## 2022-09-20 RX ADMIN — CYCLOBENZAPRINE 7.5 MG: 10 TABLET, FILM COATED ORAL at 21:59

## 2022-09-20 RX ADMIN — PROPOFOL 75 MCG/KG/MIN: 10 INJECTION, EMULSION INTRAVENOUS at 11:59

## 2022-09-20 RX ADMIN — PROPOFOL 120 MG: 10 INJECTION, EMULSION INTRAVENOUS at 10:57

## 2022-09-20 RX ADMIN — SODIUM CHLORIDE, PRESERVATIVE FREE 10 ML: 5 INJECTION INTRAVENOUS at 22:25

## 2022-09-20 RX ADMIN — DEXMEDETOMIDINE HYDROCHLORIDE 6 MCG: 100 INJECTION, SOLUTION INTRAVENOUS at 11:59

## 2022-09-20 RX ADMIN — CLONAZEPAM 1 MG: 1 TABLET ORAL at 20:53

## 2022-09-20 NOTE — ROUTINE PROCESS
Patient states okay to review and give discharge instructions to  [facility] [phone]

## 2022-09-20 NOTE — PROGRESS NOTES
Bedside shift change report given to [address] [place] [address] & [facility] Linda (oncoming nurse) by Georgette Prado (offgoing nurse). Report included the following information SBAR, OR Summary, Intake/Output, MAR, and Recent Results.

## 2022-09-20 NOTE — ANESTHESIA POSTPROCEDURE EVALUATION
Procedure(s):  L2-3, L3-4, L4-5 LATERAL INTERBODY FUSION WITH CAGE, ALLOGRAFT, PERCUTANEOUS PEDICLE SCREW INSTRUMENTATION.     general    Anesthesia Post Evaluation      Multimodal analgesia: multimodal analgesia used between 6 hours prior to anesthesia start to PACU discharge  Patient location during evaluation: PACU  Patient participation: complete - patient participated  Level of consciousness: awake  Pain score: 0  Pain management: adequate  Airway patency: patent  Anesthetic complications: no  Cardiovascular status: acceptable  Respiratory status: acceptable  Hydration status: acceptable  Post anesthesia nausea and vomiting:  controlled  Final Post Anesthesia Temperature Assessment:  Normothermia (36.0-37.5 degrees C)      INITIAL Post-op Vital signs:   Vitals Value Taken Time   /96 09/20/22 1525   Temp 36.1 °C (97 °F) 09/20/22 1517   Pulse 72 09/20/22 1525   Resp 20 09/20/22 1525   SpO2 99 % 09/20/22 1525

## 2022-09-20 NOTE — OP NOTES
Operative Note    Patient: Heriberto Rodriguez  YOB: 1960  MRN: [id number]    Date of Procedure: 9/20/2022     Pre-Op Diagnosis: SPONDYLOLISTHESIS AT L3-L4 LEVEL, LUMBAR DEGENERATIVE SCOLIOSIS, LUMBAR STENOSIS WITH NEUORGENIC CLAUDICATION    Post-Op Diagnosis: Same as preoperative diagnosis. Procedure(s):  L2-3, L3-4, L4-5 LATERAL INTERBODY FUSION WITH CAGE, ALLOGRAFT, PERCUTANEOUS PEDICLE SCREW INSTRUMENTATION    Surgeon(s):  MD Vonda Tim MD    Surgical Assistant: None    Anesthesia: General     Estimated Blood Loss (mL):  less than 507     Complications: None    Specimens:   ID Type Source Tests Collected by Time Destination   1 : Urine Culture Urine [place] Specimen CULTURE, URINE, URINALYSIS, COMPLETE Vonda Miguel MD 9/20/2022 1115 Microbiology        Implants:   Implant Name Type Inv.  Item Serial No.  Lot No. LRB No. Used Action   CAGE SPNL 8 DEG [id number] MM LLIF CONDUIT - SN/A  CAGE SPNL 8 DEG [id number] MM LLIF CONDUIT N/A [facility] LifeShield SPINE_ [id number] N/A 1 Implanted   CAGE SPNL 8 DEG [id number] MM LLIF CONDUIT - SN/A  CAGE SPNL 8 DEG [id number] MM LLIF CONDUIT N/A [employer] LifeShield SPINE_WD [id number] N/A 1 Implanted   GRAFT BNE SUB M 5ML CANC DBM FRMBL CELLULAR VIVIGEN - [id number]  GRAFT BNE SUB M 5ML CANC DBM FRMBL CELLULAR VIVIGEN [id number] [facility] TISSUE BANK_ N/A N/A 1 Implanted   GRAFT BNE SUB SM 1ML CRYOPRESERVED VIABLE LESLEY CANC BNE - [id number]  GRAFT BNE SUB SM 1ML CRYOPRESERVED VIABLE LESLEY CANC BNE [id number] [facility] TISSUE BANK_ N/A N/A 1 Implanted   CAGE SPNL 8 DEG [id number] MM LLIF CONDUIT - SN/A  CAGE SPNL 8 DEG [id number] MM LLIF CONDUIT N/A [facility] LifeShield SPINE_WD [id number] N/A 1 Implanted   SCREW SPNL L45MM DIA6MM TI POLYAX EXT TAB FOR MINIMALLY - SN/A  SCREW SPNL L45MM DIA6MM TI POLYAX EXT TAB FOR MINIMALLY N/A [employer] Life[employer]ield SPINE_WD N/A N/A 8 Implanted   SET SCR SPNL TI SGL INNR FOR VIPER 2 MINIMALLY INVASIVE - SN/A  SET SCR SPNL TI SGL INNR FOR VIPER 2 MINIMALLY INVASIVE N/A [employer] DEPU[employer] SYNTHES SPINE_WD N/A N/A 8 Implanted   ASMITA SPNL L110MM DIA5. 5MM POST PEDCL TI LORDOSED VIPER 2 - SN/A  ASMITA SPNL L110MM DIA5. 5MM POST PEDCL TI LORDOSED VIPER 2 N/A [employer] DEPUY SYNTHES SPINE_WD N/A N/A 2 Implanted       Drains: * No LDAs found *    Findings: Correction of scoliosis    Detailed Description of Procedure: Indication for Procedure  Ms. Sunday Laurent is a 57 yo woman who failed conservative treatment of L2-5 degenerative scoliosis with stenosis and neurogenic claudication. She underwent PT, medical management of pain, activity modifications and epidural steroid injections without relief of symptoms. I recommended L2-5 lateral fusion with cage, followed by percutaneous pedicle screw instrumentation. We discussed the benefits and risks of surgery in detail and the patient consented to the procedure after this discussion. Preoperative Diagnosis  1. DDD L2-5 with degenerative scoliosis  2. Lumbar stenosis L2-3, L3-4 and L4-5 with claudication    Postoperative Diagnosis  1. DDD L2-5 with degenerative scoliosis  2.  Lumbar stenosis L2-3, L3-4 and L4-5 with claudication    Operation (This is the first of two dictations on the same day)  L4-5 anterior transpsoas lumbar interbody fusion with allograft (45840)  L4-5 cage reconstruction (10766)  L3-4 anterior transpsoas lumbar interbody fusion with allograft (97813)  L3-4 cage reconstruction (13333)  L2-3 anterior transpsoas lumbar interbody fusion with allograft (45040)  L2-3 cage reconstruction (01098)  L2-5 segmental pedicle screw instrumentation (54091)    Surgeon(s)  Leo Charles MD    Assistant  Lázaro Cevallos PA-C    Anesthesia  General endotracheal    Estimated Blood Loss   10 cc for this portion of the procedure, 50 cc total for the day    Findings  Correction of scoliosis    Specimen(s)  None    Complications  None    Implants  [employer] Conduit cage [id number] mm cage at L4-5, [id number] mm cage at L3-4 and L2-3  Vivigen Formable allograft    Technique  The patient was identified in the holding area and the operative site was marked. Consent was reviewed with the patient. The patient was taken to the OR and placed on the OR table for induction of anesthesia and intubation. She was turned to the right lateral decubitus position and her left flank was prepped and draped in the usual sterile fashion. The level of the incision was marked with fluoroscopy to guide placement. The appropriate time out procedure was called and carried out. The patient received 2 g of Ancef appropriately within 1 hour prior to surgery. The longitudinal incision was placed over the L2-3, L3-4 and L4-5 disc spaces just proximal to the iliac crest.  Blunt dissection was carried out to enter the retroperitoneal space. The first dilator was docked onto the psoas muscle over the L4-5 disc space and passed through the psoas using neuromonitoring to stay anterior to the nerves. Sequential dilation was carried out with no nerve irritation noted. The retractor was docked and secured to the table appropriately. The confines of the dilator were probed with a neuromonitoring probe with no neurologic elements noted. The annulus was incised with a scalpel and discectomy was carried out with the Duncan elevator as well curettes. The contralateral annulus was disrupted with the Duncan elevator. Cage sizing was carried out and the appropriate cage was packed with 5 cc Vivigen allograft. The endplates were prepared with a rasp. The cage was impacted into place and noted to be in an acceptable position on fluoroscopic views. The retractor was withdrawn after establishing hemostasis. Using the same incision, the retractor was then docked over the L3-4 disc space using neuromonitoring. The retractor was placed and secured to the table.   The L3-4 disc space was then prepared as detailed above for L4-5. Cage sizing was carried out and the appropriate cage was packed with 5 cc Vivigen allograft. The endplates were prepared with a rasp. The cage was impacted into place and noted to be in an acceptable position on fluoroscopic views. The retractor was again removed after establishing hemostasis. Using the same incision, through a second, more proximal opening in the abdominal wall, the retractor was then docked over the L2-3 disc space using neuromonitoring. The retractor was placed and secured to the table. The L2-3 disc space was then prepared as detailed above for L3-4 and L4-5. Cage sizing was carried out and the appropriate cage was packed with 5 cc Vivigen allograft. The endplates were prepared with a rasp. The cage was impacted into place and noted to be in an acceptable position on fluoroscopic views. The retractor was again removed after establishing hemostasis. The fascia was closed with #1 Vicryl at both openings in the abdominal wall. The skin was then closed using 2-0 Vicryl. Dermabond Prineo was applied over the wound. She was then turned to the prone position on the [facility] and all bony prominences were adequately padded. Her back was prepped and draped in a sterile fashion after situating biplanar fluoroscopy to visualize the pedicles of L2, L3, L4 and L5. The paramedian incisions were made to access the L2, L3, L4 and L5 pedicles bilaterally. The fascia was split in line with the skin incision. Screws were pre selected using MRI preoperative planning. All the screws were applied using the following technique: The screw tip was first docked to the 3 o'clock position for the right screw and 9 o'clock position for the left screw at the midpoint of the pedicle just lateral to the lateral pedicle wall. The trocar from the Prime screw was then advanced into the pedicle. Once the trocar trajectory was felt to be acceptable, the screw was advanced over the trocar. When the screw tip approached the medial wall of the pedicle on each side, lateral view was used to confirm adequate depth of insertion of the pedicle into the vertebral body to avoid breach of the medial wall, before pedicle screw was appropriately seated. Once all 8 screws were placed, brii length was measured and 110 mm rods were reduced into the screw heads bilaterally. End caps were applied and final tightened into place with correction of rotational deformity by segmental reduction of screw heads to the rods as well as compression at the left L2-3 level. Final x-rays confirmed acceptable placement of the rods and screws. Overall alignment of the spine was felt to be adequate. No change in cage positions was noted. The extension tabs were then broken off each screw using standard technique. The wounds were irrigated with sterile saline and closed in layers using #1 Vicryl for fascial closure, 2-0 Vicryl for subcutaneous tissue closure. Dermabond Prineo was used to seal the skin. My PA assisted with patient positioning, prep, draping, retraction of the psoas, clearing of the instruments, packing of cage, placement of screws on his side and placement of endcaps. He completed the closure of each wound independently. The patient was then turned to the supine position, extubated without incident and transferred to recovery room in stable condition.       Electronically Signed by Dunia Pittman MD on 9/20/2022 at 3:08 PM

## 2022-09-20 NOTE — PROGRESS NOTES
TRANSFER - IN REPORT:    Verbal report received from Janice BURGER(name) on Js Becerril  being received from [facility]) for routine post - op      Report consisted of patients Situation, Background, Assessment and   Recommendations(SBAR). Information from the following report(s) SBAR, OR Summary, Intake/Output, and MAR was reviewed with the receiving nurse. Opportunity for questions and clarification was provided. Assessment completed upon patients arrival to unit and care assumed.      Call light within reach

## 2022-09-20 NOTE — ANESTHESIA PREPROCEDURE EVALUATION
Relevant Problems   NEUROLOGY   (+) Generalized anxiety disorder   (+) Migraine   (+) Panic disorder with agoraphobia      CARDIOVASCULAR   (+) Long Q-T syndrome   (+) WPW (Fazal-Parkinson-White syndrome)      ENDOCRINE   (+) Acquired hypothyroidism       Anesthetic History     PONV          Review of Systems / Medical History  Patient summary reviewed and pertinent labs reviewed    Pulmonary    COPD          Pertinent negatives: No smoker  Comments: Marijuana   Neuro/Psych         Psychiatric history     Cardiovascular    Hypertension        Dysrhythmias     Pertinent negatives: No CAD    Comments: WPW Syndrome    Sinus rhythm with short MO   ST abnormality, possible digitalis effect   Prolonged QT    GI/Hepatic/Renal  Within defined limits              Endo/Other      Hypothyroidism       Other Findings            Past Medical History:   Diagnosis Date    Agoraphobia     Anxiety     Chronic obstructive pulmonary disease (HCC)     Chronic pain     pt states off and on x 10 years, increased over last 6 months    Claustrophobia     History of mammography, screening 2012    Normal    Hypertension     Nausea & vomiting     Stuttering     Thyroid disease     Tremors of nervous system     essential tremors - pt states x 5 years    Fazal-Parkinson-White (WPW) pattern     pt stated diagnosed approx 10 years ago       Past Surgical History:   Procedure Laterality Date    HX HEENT      right ear tube inserted    HX HYSTERECTOMY      HX SEPTOPLASTY  10/24/2019       Current Outpatient Medications   Medication Instructions    acetaminophen-codeine (Tylenol-Codeine #3) 300-30 mg per tablet 0.5 Tablets, Oral, 3 TIMES DAILY, As needed    albuterol (PROVENTIL HFA) 90 mcg/actuation inhaler 1 Puff, Inhalation, EVERY 6 HOURS AS NEEDED    atenoloL (TENORMIN) 12.5 mg, Oral, 7AM    atorvastatin (LIPITOR) 10 mg, Oral, DAILY    clonazePAM (KLONOPIN) 1 mg, Oral, 2 TIMES DAILY    cyclobenzaprine (FLEXERIL) 10 mg tablet TAKE ONE TABLET BY MOUTH ONCE DAILY AS NEEDED FOR MUSCLE TENSION HEADACHE    diclofenac potassium (CATAFLAM) 50 mg, Oral, 3 TIMES DAILY    ergocalciferol (ERGOCALCIFEROL) 50,000 Units, Oral, EVERY 7 DAYS, For 8 weeks and then once monthly.  HYDROcodone-acetaminophen (NORCO) 5-325 mg per tablet 1-2 Tablets    levothyroxine (SYNTHROID) 25 mcg, Oral, DAILY BEFORE BREAKFAST    lidocaine 5 % topical cream Topical, 2 TIMES DAILY AS NEEDED    naloxone (NARCAN) 4 mg/actuation nasal spray Use 1 spray intranasally, then discard. Repeat with new spray every 2 min as needed for opioid overdose symptoms, alternating nostrils.  nitroglycerin (NITROSTAT) 0.4 mg, SubLINGual, EVERY 5 MIN AS NEEDED, For 2 doses. If cp is unrelieved after second dose call 911.  ofloxacin (FLOXIN) 0.3 % otic solution No dose, route, or frequency recorded.  ondansetron (ZOFRAN ODT) 4 mg    rimegepant (NURTEC) 75 mg, Oral, ONCE PRN    rizatriptan (MAXALT) 5 mg, ONCE PRN    tiotropium (Spiriva with HandiHaler) 18 mcg inhalation capsule 1 Capsule, Inhalation, AS NEEDED       No current facility-administered medications for this encounter. Current Outpatient Medications   Medication Sig    levothyroxine (SYNTHROID) 25 mcg tablet Take 25 mcg by mouth Daily (before breakfast).  clonazePAM (KlonoPIN) 1 mg tablet Take 1 mg by mouth two (2) times a day.  atorvastatin (LIPITOR) 10 mg tablet Take 10 mg by mouth daily.  acetaminophen-codeine (Tylenol-Codeine #3) 300-30 mg per tablet Take 0.5 Tablets by mouth three (3) times daily. As needed    diclofenac potassium (CATAFLAM) 50 mg tablet Take 50 mg by mouth three (3) times daily.  tiotropium (Spiriva with HandiHaler) 18 mcg inhalation capsule Take 1 Capsule by inhalation as needed.  rimegepant (NURTEC) 75 mg disintegrating tablet Take 75 mg by mouth once as needed for Migraine.  lidocaine 5 % topical cream Apply  to affected area two (2) times daily as needed.     ondansetron (ZOFRAN ODT) 4 mg disintegrating tablet 4 mg. (Patient not taking: Reported on 9/1/2022)    HYDROcodone-acetaminophen (NORCO) 5-325 mg per tablet 1-2 Tabs. (Patient not taking: Reported on 9/1/2022)    naloxone [facility]) 4 mg/actuation nasal spray Use 1 spray intranasally, then discard. Repeat with new spray every 2 min as needed for opioid overdose symptoms, alternating nostrils.  atenolol (TENORMIN) 25 mg tablet Take 0.5 Tabs by mouth every morning. (Patient taking differently: Take 25 mg by mouth daily.)    cyclobenzaprine (FLEXERIL) 10 mg tablet TAKE ONE TABLET BY MOUTH ONCE DAILY AS NEEDED FOR MUSCLE TENSION HEADACHE (Patient taking differently: Take 7.5 mg by mouth two (2) times a day. AS NEEDED FOR MUSCLE TENSION HEADACHE)    ergocalciferol (ERGOCALCIFEROL) 50,000 unit capsule Take 1 Cap by mouth every seven (7) days. For 8 weeks and then once monthly. (Patient not taking: Reported on 9/1/2022)    ofloxacin (FLOXIN) 0.3 % otic solution  (Patient not taking: Reported on 9/1/2022)    rizatriptan (MAXALT) 5 mg tablet Take 5 mg by mouth once as needed for Migraine. May repeat in 2 hours if needed (Patient not taking: Reported on 9/1/2022)    albuterol (PROVENTIL HFA) 90 mcg/actuation inhaler Take 1 Puff by inhalation every six (6) hours as needed for Wheezing or Shortness of Breath.  nitroglycerin (NITROSTAT) 0.4 mg SL tablet 1 Tab by SubLINGual route every five (5) minutes as needed for Chest Pain. For 2 doses. If cp is unrelieved after second dose call 911. No data found.     Lab Results   Component Value Date/Time    WBC 5.4 09/01/2022 01:45 PM    Hemoglobin (POC) 13.8 12/07/2017 09:47 AM    HGB 13.2 09/01/2022 01:45 PM    Hematocrit (POC) 39 07/29/2019 01:54 PM    HCT 40.9 09/01/2022 01:45 PM    PLATELET 707 67/63/0978 01:45 PM    MCV 94.2 09/01/2022 01:45 PM     Lab Results   Component Value Date/Time    Sodium 140 09/01/2022 01:45 PM    Potassium 4.3 09/01/2022 01:45 PM Chloride 106 09/01/2022 01:45 PM    CO2 32 09/01/2022 01:45 PM    Anion gap 2 (L) 09/01/2022 01:45 PM    Glucose 98 09/01/2022 01:45 PM    BUN 12 09/01/2022 01:45 PM    Creatinine 0.59 09/01/2022 01:45 PM    BUN/Creatinine ratio 20 09/01/2022 01:45 PM    GFR est AA >60 09/01/2022 01:45 PM    GFR est non-AA >60 09/01/2022 01:45 PM    Calcium 9.0 09/01/2022 01:45 PM     No results found for: APTT, PTP, INR, INREXT  Lab Results   Component Value Date/Time    Glucose 98 09/01/2022 01:45 PM    Glucose (POC) 80 07/29/2019 01:54 PM         Physical Exam    Airway  Mallampati: II    Neck ROM: normal range of motion        Cardiovascular    Rhythm: regular  Rate: normal         Dental         Pulmonary  Breath sounds clear to auscultation               Abdominal         Other Findings            Anesthetic Plan    ASA: 3  Anesthesia type: general    Monitoring Plan: Continuous noninvasive hemodynamic monitoring      Induction: Intravenous  Anesthetic plan and risks discussed with: Patient

## 2022-09-21 LAB
ANION GAP SERPL CALC-SCNC: 7 MMOL/L (ref 5–15)
BUN SERPL-MCNC: 9 MG/DL (ref 6–20)
BUN/CREAT SERPL: 15 (ref 12–20)
CA-I BLD-MCNC: 8.2 MG/DL (ref 8.5–10.1)
CHLORIDE SERPL-SCNC: 105 MMOL/L (ref 97–108)
CO2 SERPL-SCNC: 27 MMOL/L (ref 21–32)
CREAT SERPL-MCNC: 0.61 MG/DL (ref 0.55–1.02)
GLUCOSE SERPL-MCNC: 124 MG/DL (ref 65–100)
HGB BLD-MCNC: 10.8 G/DL (ref 11.5–16)
POTASSIUM SERPL-SCNC: 4.1 MMOL/L (ref 3.5–5.1)
SODIUM SERPL-SCNC: 139 MMOL/L (ref 136–145)

## 2022-09-21 PROCEDURE — 74011250637 HC RX REV CODE- 250/637: Performed by: ORTHOPAEDIC SURGERY

## 2022-09-21 PROCEDURE — 85018 HEMOGLOBIN: CPT

## 2022-09-21 PROCEDURE — 80048 BASIC METABOLIC PNL TOTAL CA: CPT

## 2022-09-21 PROCEDURE — 36415 COLL VENOUS BLD VENIPUNCTURE: CPT

## 2022-09-21 PROCEDURE — 97530 THERAPEUTIC ACTIVITIES: CPT

## 2022-09-21 PROCEDURE — 97116 GAIT TRAINING THERAPY: CPT

## 2022-09-21 PROCEDURE — 65270000029 HC RM PRIVATE

## 2022-09-21 PROCEDURE — 97161 PT EVAL LOW COMPLEX 20 MIN: CPT

## 2022-09-21 PROCEDURE — 97165 OT EVAL LOW COMPLEX 30 MIN: CPT

## 2022-09-21 PROCEDURE — 74011250637 HC RX REV CODE- 250/637: Performed by: PHYSICIAN ASSISTANT

## 2022-09-21 PROCEDURE — 74011000250 HC RX REV CODE- 250: Performed by: PHYSICIAN ASSISTANT

## 2022-09-21 RX ADMIN — CYCLOBENZAPRINE 7.5 MG: 10 TABLET, FILM COATED ORAL at 07:35

## 2022-09-21 RX ADMIN — OXYCODONE HYDROCHLORIDE 10 MG: 10 TABLET ORAL at 05:35

## 2022-09-21 RX ADMIN — CLONAZEPAM 1 MG: 1 TABLET ORAL at 07:35

## 2022-09-21 RX ADMIN — OXYCODONE 5 MG: 5 TABLET ORAL at 02:00

## 2022-09-21 RX ADMIN — OXYCODONE HYDROCHLORIDE 10 MG: 10 TABLET ORAL at 16:59

## 2022-09-21 RX ADMIN — CLONAZEPAM 1 MG: 1 TABLET ORAL at 20:29

## 2022-09-21 RX ADMIN — OXYCODONE HYDROCHLORIDE 10 MG: 10 TABLET ORAL at 09:18

## 2022-09-21 RX ADMIN — SODIUM CHLORIDE, PRESERVATIVE FREE 10 ML: 5 INJECTION INTRAVENOUS at 06:30

## 2022-09-21 RX ADMIN — ATORVASTATIN CALCIUM 10 MG: 20 TABLET, FILM COATED ORAL at 07:35

## 2022-09-21 RX ADMIN — SODIUM CHLORIDE, PRESERVATIVE FREE 10 ML: 5 INJECTION INTRAVENOUS at 15:26

## 2022-09-21 RX ADMIN — CYCLOBENZAPRINE 7.5 MG: 10 TABLET, FILM COATED ORAL at 20:28

## 2022-09-21 RX ADMIN — SENNOSIDES AND DOCUSATE SODIUM 1 TABLET: 50; 8.6 TABLET ORAL at 07:35

## 2022-09-21 RX ADMIN — OXYCODONE HYDROCHLORIDE 10 MG: 10 TABLET ORAL at 13:00

## 2022-09-21 RX ADMIN — SENNOSIDES AND DOCUSATE SODIUM 1 TABLET: 50; 8.6 TABLET ORAL at 20:29

## 2022-09-21 RX ADMIN — LEVOTHYROXINE SODIUM 25 MCG: 0.03 TABLET ORAL at 07:36

## 2022-09-21 RX ADMIN — ATENOLOL 25 MG: 25 TABLET ORAL at 07:35

## 2022-09-21 RX ADMIN — OXYCODONE HYDROCHLORIDE 10 MG: 10 TABLET ORAL at 20:50

## 2022-09-21 RX ADMIN — SODIUM CHLORIDE, PRESERVATIVE FREE 10 ML: 5 INJECTION INTRAVENOUS at 21:00

## 2022-09-21 NOTE — PROGRESS NOTES
Bedside shift change report given to Lita BURGER (oncoming nurse) by Randall Adam (offgoing nurse). Report included the following information SBAR, OR Summary, MAR, and Recent Results.

## 2022-09-21 NOTE — PROGRESS NOTES
CM discussed discharge planing with patient and  at bedside. Patient will go home with [address] and rolling walker per therapy recommendation. Patient lives in [facility] she is unaware of [facility]. Blank referral sent. [facility]. [facility]erral faxed to [facility] [phone] in [place]. Awaiting reply. Phone # [phone]. Fax to [facility] [phone] for rolling walker. Awaiting reply.

## 2022-09-21 NOTE — PROGRESS NOTES
Problem: Mobility Impaired (Adult and Pediatric)  Goal: *Acute Goals and Plan of Care (Insert Text)  Description: Mod I with all transfers x7 days  Mod I with bed mob x7 days  Amb 100ft with RW and SBAx1 x7 days  Ascend/descend 4 steps with rails and CGAx1 x7 days    Pt stated goal: to go home  Outcome: Not Met    PHYSICAL THERAPY EVALUATION  Patient: Js Becerril (62 y.o. female)  Date: 9/21/2022  Primary Diagnosis: Lumbar stenosis [M48.061]  Scoliosis of lumbar region due to degenerative disease of spine in adult [M41.86]  Procedure(s) (LRB):  L2-3, L3-4, L4-5 LATERAL INTERBODY FUSION WITH CAGE, ALLOGRAFT, PERCUTANEOUS PEDICLE SCREW INSTRUMENTATION (N/A) 1 Day Post-Op   Precautions: lumbar precautions       ASSESSMENT    61yo F s/p lumbar fusion POD1 DOS 9/20/22 presents to PT with decreased bed mob, transfers, LE strength, gt, activity tolerance, and overall functional mobility. PMH listed below. Pt up and amb in hallway with nsg upon PT arrival, pt had already amb approx 45ft with nsg, then PT arrived and pt was able to amb more with PT. Pt alert and orientedx4. Pt lives with spouse in 1 story home with 4 IRLANDA home without rails. Pt amb with cane at baseline and is usually I with ADLs. Currently, pt is CGA with stand to sit transfers and to return to supine in bed after amb. Pt able to amb approx 30more feet with RW and CGAx1. Pt able to verbalize lumbar precautions. Pt amb with slow, steady prabhakar, shuffling gt pattern at times. Pt may benefit from skilled PT to address her functional deficits. Recommend d/c home with family care upon d/c at this time. Pt will need RW for home use. PLAN :  Recommendations and Planned Interventions: bed mobility training, transfer training, gait training, therapeutic exercises, patient and family training/education, and therapeutic activities      Frequency/Duration: Patient will be followed by physical therapy:  daily to address goals.     Recommendation for discharge: (in order for the patient to meet his/her long term goals)  Home with Family Care    IF patient discharges home will need the following DME: rolling walker         SUBJECTIVE:   Patient up amb in hallway with nsg upon PT arrival    OBJECTIVE DATA SUMMARY:   HISTORY:    Past Medical History:   Diagnosis Date    Agoraphobia     Anxiety     Chronic obstructive pulmonary disease (HCC)     Chronic pain     pt states off and on x 10 years, increased over last 6 months    Claustrophobia     History of mammography, screening 2012    Normal    Hypertension     Nausea & vomiting     Stuttering     Thyroid disease     Tremors of nervous system     essential tremors - pt states x 5 years    Fazal-Parkinson-White (WPW) pattern     pt stated diagnosed approx 10 years ago     Past Surgical History:   Procedure Laterality Date    HX HEENT      right ear tube inserted    HX HYSTERECTOMY      HX SEPTOPLASTY  10/24/2019       Personal factors and/or comorbidities impacting plan of care:     Home Situation  Home Environment: Private residence  # Steps to Enter: 4  Rails to Enter: No  One/Two Story Residence: One story  Living Alone: No  Support Systems: Spouse/Significant Other  Patient Expects to be Discharged to[de-identified] Home  Current DME Used/Available at Home: Cane, straight  Tub or Shower Type: Tub/Shower combination        EXAMINATION/PRESENTATION/DECISION MAKING:   Critical Behavior:  Neurologic State: Alert  Orientation Level: Oriented X4  Cognition: Follows commands           Range Of Motion:  AROM: Generally decreased, functional  B LE    Strength:    Strength: Generally decreased, functional  Grossly 4-/5 B LE    Tone & Sensation:     Sensation: Intact       Coordination:  Coordination: Within functional limits    Functional Mobility:  Bed Mobility:  Rolling: Stand-by assistance; Additional time  Supine to Sit: Stand-by assistance; Additional time  Sit to Supine: Contact guard assistance  Scooting: Stand-by assistance  Transfers:  Sit to Stand: Contact guard assistance  Stand to Sit: Contact guard assistance  Stand Pivot Transfers: Contact guard assistance                    Balance:   Sitting: Intact; With support  Standing: Impaired; With support  Standing - Static: Good;Constant support  Standing - Dynamic : Fair;Constant support  Ambulation/Gait Training:  Distance (ft): 30 Feet (ft)  Assistive Device: Walker, rolling  Ambulation - Level of Assistance: Contact guard assistance            Functional Measure:  [facility] 6 Clicks         Basic Mobility Inpatient Short Form  How much difficulty does the patient currently have. .. Unable A Lot A Little None   1. Turning over in bed (including adjusting bedclothes, sheets and blankets)? [] 1   [] 2   [x] 3   [] 4   2. Sitting down on and standing up from a chair with arms ( e.g., wheelchair, bedside commode, etc.)   [] 1   [] 2   [x] 3   [] 4   3. Moving from lying on back to sitting on the side of the bed? [] 1   [] 2   [x] 3   [] 4          How much help from another person does the patient currently need. .. Total A Lot A Little None   4. Moving to and from a bed to a chair (including a wheelchair)? [] 1   [] 2   [x] 3   [] 4   5. Need to walk in hospital room? [] 1   [] 2   [x] 3   [] 4   6. Climbing 3-5 steps with a railing? [] 1   [] 2   [x] 3   [] 4   © 2007, Trustees of [facility], under license to [employer]. All rights reserved     Score:  Initial: 18 Most Recent: X (Date: -- )   Interpretation of Tool:  Represents activities that are increasingly more difficult (i.e. Bed mobility, Transfers, Gait).   Score 24 23 22-20 19-15 14-10 9-7 6   Modifier CH CI CJ CK CL CM CN           Physical Therapy Evaluation Charge Determination   History Examination Presentation Decision-Making   MEDIUM  Complexity : 1-2 comorbidities / personal factors will impact the outcome/ POC  MEDIUM Complexity : 3 Standardized tests and measures addressing body structure, function, activity limitation and / or participation in recreation  LOW Complexity : Stable, uncomplicated  Other Functional Measure [facility] 6 MED      Based on the above components, the patient evaluation is determined to be of the following complexity level: LOW     Pain Ratin.5/10    Activity Tolerance:   Fair      After treatment patient left in no apparent distress:   Supine in bed, Call bell within reach, Caregiver / family present, Side rails x 3, and bed locked and in lowest level          COMMUNICATION/EDUCATION:   The patients plan of care was discussed with: Registered nurse. Fall prevention education was provided and the patient/caregiver indicated understanding. and Patient/family agree to work toward stated goals and plan of care.       Thank you for this referral.  Shabana Connors   Time Calculation: 20 mins

## 2022-09-21 NOTE — PROGRESS NOTES
OCCUPATIONAL THERAPY EVALUATION  Patient: Whitley Mae (46 y.o. female)  Date: 9/21/2022  Primary Diagnosis: Lumbar stenosis [M48.061]  Scoliosis of lumbar region due to degenerative disease of spine in adult [M41.86]  Procedure(s) (LRB):  L2-3, L3-4, L4-5 LATERAL INTERBODY FUSION WITH CAGE, ALLOGRAFT, PERCUTANEOUS PEDICLE SCREW INSTRUMENTATION (N/A) 1 Day Post-Op   Precautions: fall risk, spinal precautions, log rolling technique       ASSESSMENT  Pt is a 58 y.o. female with primary dx lumbar stenosis, spondylolisthesis at L3-L4 level, presenting to [facility] and s/p elective L2-3, L3-4, L4-5 interbody fusion with cage, allograft, percutaneous pedicle screw instrumentation by Dr. Nestor Sandoval on 9/20/22. Pt received semi-supine in bed upon arrival, AXO x4, and agreeable to OT evaluation. Spouse present at bedside with pt permission. Per pt report:   Home Situation  Home Environment: Private residence  # Steps to Enter: 4  Rails to Enter: No  One/Two Story Residence: One story  Living Alone: No  Support Systems: Spouse/Significant Other  Patient Expects to be Discharged to[de-identified] Home  Current DME Used/Available at Home: Cane, straight, raised toilet seat  Tub or Shower Type: Tub/Shower combination  PLOF: IND with ADLs and ambulatory with SPC    Functional Mobility and Transfers for ADLs:  Bed Mobility:  Rolling: Stand-by assistance; Additional time  Supine to Sit: Stand-by assistance; Additional time  Sit to Supine: Contact guard assistance  Scooting: Stand-by assistance    Transfers:  Sit to Stand: Contact guard assistance  Stand to Sit: Contact guard assistance  Bathroom Mobility: Contact guard assistance  Toilet Transfer : Contact guard assistance    ADL Intervention and task modifications:  Grooming  Grooming Assistance: Set-up; Stand-by assistance  Position Performed: Seated in chair  Brushing Teeth: Set-up; Stand-by assistance  Brushing/Combing Hair: Set-up; Stand-by assistance    Lower Body Dressing Assistance  Socks:  Total assistance (dependent) (Simulated)    Toileting  Toileting Assistance: Set-up; Stand-by assistance  Adaptive Equipment: Grab bars; Walker    Based on current observations, pt presents with deficits in generalized strength/AROM, bed mobility, static/dynamic sitting balance, static/dynamic standing balance (see PT note for gait details), functional activity tolerance, and pain currently impacting overall performance of ADLs and functional transfers/mobility. Pt able to recall 3/3 spinal precautions prior to OOB mobility. Pt educated on compensatory dressing techniques/use of AE, energy conservation strategies and home safety with good understanding verbalized/demonstrated throughout today's session. Overall, pt tolerates session fair with c/o 10/10 back pain (RN aware) and additional time for OOB ADL/mobility, seated rest required with grooming tasks sink side s/t fatigue. Pt would benefit from continued skilled OT services to address current impairments and improve IND and safety with self cares and functional transfers/mobility. Current OT d/c recommendation HHOT with family care and RW once medically appropriate. Other factors to consider for discharge: family/social support, DME, time since onset, severity of deficits, functional baseline     Patient will benefit from skilled therapy intervention to address the above noted impairments. PLAN :  Recommendations and Planned Interventions: self care training, functional mobility training, therapeutic exercise, balance training, therapeutic activities, endurance activities, patient education, home safety training, and family training/education    Frequency/Duration: Patient will be followed by occupational therapy:  3-5x/week to address goals.     Recommendation for discharge: (in order for the patient to meet his/her long term goals)  HHOT with family care and RW    This discharge recommendation:  Has been made in collaboration with the attending provider and/or case management    IF patient discharges home will need the following DME: walker: rolling       SUBJECTIVE:   Patient stated I need to sit to brush my hair.     OBJECTIVE DATA SUMMARY:   HISTORY:   Past Medical History:   Diagnosis Date    Agoraphobia     Anxiety     Chronic obstructive pulmonary disease ([address]     Chronic pain     pt states off and on x 10 years, increased over last 6 months    Claustrophobia     History of mammography, screening 2012    Normal    Hypertension     Nausea & vomiting     Stuttering     Thyroid disease     Tremors of nervous system     essential tremors - pt states x 5 years    Fazal-Parkinson-White (WPW) pattern     pt stated diagnosed approx 10 years ago     Past Surgical History:   Procedure Laterality Date    HX HEENT      right ear tube inserted    HX HYSTERECTOMY      HX SEPTOPLASTY  10/24/2019         EXAMINATION OF PERFORMANCE DEFICITS:  Cognitive/Behavioral Status:  Neurologic State: Alert  Orientation Level: Oriented X4  Cognition: Follows commands           Range of Motion:  AROM: Generally decreased, functional                         Strength:  Strength: Generally decreased, functional                Coordination:  Coordination: Within functional limits  Fine Motor Skills-Upper: Left Intact; Right Intact    Gross Motor Skills-Upper: Left Intact; Right Intact    Tone & Sensation:     Sensation: Intact                      Balance:  Sitting: Intact; With support  Standing: Impaired; With support  Standing - Static: Good;Constant support  Standing - Dynamic : Fair;Constant support    Functional Mobility and Transfers for ADLs:  Bed Mobility:  Rolling: Stand-by assistance; Additional time  Supine to Sit: Stand-by assistance; Additional time  Sit to Supine: Contact guard assistance  Scooting: Stand-by assistance    Transfers:  Sit to Stand: Contact guard assistance  Stand to Sit: Contact guard assistance  Bathroom Mobility: Contact guard assistance  Toilet Transfer : Contact guard assistance      ADL Intervention and task modifications:       Grooming  Grooming Assistance: Set-up; Stand-by assistance  Position Performed: Seated in chair  Brushing Teeth: Set-up; Stand-by assistance  Brushing/Combing Hair: Set-up; Stand-by assistance                   Lower Body Dressing Assistance  Socks: Total assistance (dependent) (Simulated)    Toileting  Toileting Assistance: Set-up; Stand-by assistance  Adaptive Equipment: Grab bars; Walker         Therapeutic Exercise:  Pt would benefit from UE HEP to improve overall UE AROM/strength and can be further educated in next treatment session. Functional Measure:    [facility] AM-PACTM \"6 Clicks\"                                                       Daily Activity Inpatient Short Form  How much help from another person does the patient currently need. .. Total; A Lot A Little None   1. Putting on and taking off regular lower body clothing? []  1 []  2 [x]  3 []  4   2. Bathing (including washing, rinsing, drying)? []  1 []  2 [x]  3 []  4   3. Toileting, which includes using toilet, bedpan or urinal? [] 1 []  2 [x]  3 []  4   4. Putting on and taking off regular upper body clothing? []  1 []  2 [x]  3 []  4   5. Taking care of personal grooming such as brushing teeth? []  1 []  2 [x]  3 []  4   6. Eating meals? []  1 []  2 []  3 [x]  4   © 2007, Trustees of [facility], under license to [employer]. All rights reserved     Score: 19/24     Interpretation of Tool:  Represents clinically-significant functional categories (i.e. Activities of daily living).   Percentage of Impairment CH    0%   CI    1-19% CJ    20-39% CK    40-59% CL    60-79% CM    80-99% CN     100%   [facility]  Score 6-24 24 23 20-22 15-19 10-14 7-9 6     Occupational Therapy Evaluation Charge Determination   History Examination Decision-Making   LOW Complexity : Brief history review  LOW Complexity : 1-3 performance deficits relating to physical, cognitive , or psychosocial skils that result in activity limitations and / or participation restrictions  MEDIUM Complexity : Patient may present with comorbidities that affect occupational performnce. Miniml to moderate modification of tasks or assistance (eg, physical or verbal ) with assesment(s) is necessary to enable patient to complete evaluation       Based on the above components, the patient evaluation is determined to be of the following complexity level: LOW   Pain Rating:  10/10 to back (RN aware)    Activity Tolerance:   Fair and requires rest breaks    After treatment patient left in no apparent distress:    Supine in bed, Patient positioned with pillow to R side per pt request for pressure relief, Call bell within reach, Caregiver / family present, and Side rails x 3, bed locked and in lowest position    COMMUNICATION/EDUCATION:   The patients plan of care was discussed with: Physical therapist and Registered nurse. Home safety education was provided and the patient/caregiver indicated understanding., Patient/family have participated as able in goal setting and plan of care. , and Patient/family agree to work toward stated goals and plan of care. This patients plan of care is appropriate for delegation to [facility].     Thank you for this referral.  Donovan Sun  Time Calculation: 38 mins   Problem: Self Care Deficits Care Plan (Adult)  Goal: *Acute Goals and Plan of Care (Insert Text)  Description: Pt stated goal \"to be in less pain\"  Pt will be MI sup <> sit in prep for EOB ADLs  Pt will be MI grooming standing sink side LRAD  Pt will be MI UB dressing sitting EOB/long sit   Pt will be MI LE dressing sitting EOB/long sit  Pt will be MI sit <>  prep for toileting LRAD  Pt will be MI toileting/toilet transfer/cloth mgmt LRAD  Pt will be IND following UE HEP in prep for self care tasks      Outcome: Not Met

## 2022-09-21 NOTE — PROGRESS NOTES
Assist light from patient's bathroom went off, found patient to be on the toilet by herself. Applied gait belt and assisted patient back to bed with walker. Assisted patient to get repositioned and informed her of the dangers of falls in the hospital and that she is at a higher risk of falls right now after having surgery and pain medication in her system. Requested that patient please call for help with her call light the next time she needs to go to the restroom or would like to get up in the chair, and nursing staff is happy to assist her. Patient stated \"I couldn't wait, I had to go. \"  Patient in bed with call light within reach. Bed alarm on.

## 2022-09-21 NOTE — PROGRESS NOTES
NAME:     Jalen Kiran   :       1960   MRN:       [id number]   DATE:      2022    POD:    1 Day Post-Op  S/P:    Procedure(s):  L2-3, L3-4, L4-5 LATERAL INTERBODY FUSION WITH CAGE, ALLOGRAFT, PERCUTANEOUS PEDICLE SCREW INSTRUMENTATION    SUBJECTIVE:    Patient doing well. Low back pain moderate. Leg symptoms improving. Recent Labs     22  0640      K 4.1      CO2 27   BUN 9   CREA 0.61   *     Patient Vitals for the past 12 hrs:   BP Temp Pulse Resp SpO2   22 0654 (!) 150/88 98.2 °F (36.8 °C) 86 16 92 %   22 0528 122/70 98.7 °F (37.1 °C) 93 16 95 %   22 0200 (!) 140/89 98.9 °F (37.2 °C) 88 18 94 %   22 0024 (!) 140/90 98.9 °F (37.2 °C) 88 18 94 %   22 2300 -- -- 90 -- 93 %   22 2230 -- -- 90 18 94 %   22 2200 (!) 165/94 -- 77 18 94 %   22 2053 (!) 154/94 -- 80 18 97 %       EXAM:  No motor or sensory deficits noted. 5/5 strength bilateral lower extremities  2+ reflexes   Dressing clean, dry, intact. Incision without erythema, induration, fluctuance. Calves NTTP bilaterally  Distal pulses palpable bilaterally.     PLAN:  Continue PT/OT  Pain better controlled with Percocet  Plan to D/C home tomorrow     Kelton Nation PA-C

## 2022-09-22 VITALS
OXYGEN SATURATION: 92 % | SYSTOLIC BLOOD PRESSURE: 115 MMHG | HEART RATE: 85 BPM | DIASTOLIC BLOOD PRESSURE: 81 MMHG | WEIGHT: 144.84 LBS | TEMPERATURE: 98.6 F | RESPIRATION RATE: 20 BRPM | BODY MASS INDEX: 24.73 KG/M2

## 2022-09-22 LAB
BACTERIA SPEC CULT: NORMAL
HGB BLD-MCNC: 11 G/DL (ref 11.5–16)
SPECIAL REQUESTS,SREQ: NORMAL

## 2022-09-22 PROCEDURE — 74011250637 HC RX REV CODE- 250/637: Performed by: ORTHOPAEDIC SURGERY

## 2022-09-22 PROCEDURE — 74011250637 HC RX REV CODE- 250/637: Performed by: PHYSICIAN ASSISTANT

## 2022-09-22 PROCEDURE — 85018 HEMOGLOBIN: CPT

## 2022-09-22 PROCEDURE — 74011250636 HC RX REV CODE- 250/636: Performed by: ORTHOPAEDIC SURGERY

## 2022-09-22 PROCEDURE — 97116 GAIT TRAINING THERAPY: CPT

## 2022-09-22 PROCEDURE — 94761 N-INVAS EAR/PLS OXIMETRY MLT: CPT

## 2022-09-22 PROCEDURE — 97530 THERAPEUTIC ACTIVITIES: CPT

## 2022-09-22 PROCEDURE — 74011000250 HC RX REV CODE- 250: Performed by: PHYSICIAN ASSISTANT

## 2022-09-22 PROCEDURE — 36415 COLL VENOUS BLD VENIPUNCTURE: CPT

## 2022-09-22 RX ORDER — ATENOLOL 25 MG/1
25 TABLET ORAL DAILY
Qty: 30 TABLET | Refills: 0 | Status: SHIPPED
Start: 2022-09-22

## 2022-09-22 RX ORDER — ACETAMINOPHEN 500 MG
1000 TABLET ORAL EVERY 6 HOURS
Status: DISCONTINUED | OUTPATIENT
Start: 2022-09-22 | End: 2022-09-22 | Stop reason: HOSPADM

## 2022-09-22 RX ORDER — KETOROLAC TROMETHAMINE 30 MG/ML
30 INJECTION, SOLUTION INTRAMUSCULAR; INTRAVENOUS EVERY 6 HOURS
Status: DISCONTINUED | OUTPATIENT
Start: 2022-09-22 | End: 2022-09-22 | Stop reason: HOSPADM

## 2022-09-22 RX ORDER — ACETAMINOPHEN 500 MG
1000 TABLET ORAL
Qty: 30 TABLET | Refills: 0 | Status: SHIPPED
Start: 2022-09-22

## 2022-09-22 RX ORDER — AMOXICILLIN 250 MG
1 CAPSULE ORAL
Qty: 20 TABLET | Refills: 0 | Status: SHIPPED | OUTPATIENT
Start: 2022-09-22

## 2022-09-22 RX ORDER — OXYCODONE HYDROCHLORIDE 5 MG/1
5 TABLET ORAL
Qty: 15 TABLET | Refills: 0 | Status: SHIPPED | OUTPATIENT
Start: 2022-09-22 | End: 2022-09-26

## 2022-09-22 RX ADMIN — CYCLOBENZAPRINE 7.5 MG: 10 TABLET, FILM COATED ORAL at 08:16

## 2022-09-22 RX ADMIN — LEVOTHYROXINE SODIUM 25 MCG: 0.03 TABLET ORAL at 08:17

## 2022-09-22 RX ADMIN — KETOROLAC TROMETHAMINE 30 MG: 30 INJECTION, SOLUTION INTRAMUSCULAR at 12:22

## 2022-09-22 RX ADMIN — ACETAMINOPHEN 1000 MG: 500 TABLET ORAL at 08:16

## 2022-09-22 RX ADMIN — KETOROLAC TROMETHAMINE 30 MG: 30 INJECTION, SOLUTION INTRAMUSCULAR at 08:16

## 2022-09-22 RX ADMIN — SODIUM CHLORIDE, PRESERVATIVE FREE 10 ML: 5 INJECTION INTRAVENOUS at 06:58

## 2022-09-22 RX ADMIN — OXYCODONE 5 MG: 5 TABLET ORAL at 13:56

## 2022-09-22 RX ADMIN — SENNOSIDES AND DOCUSATE SODIUM 1 TABLET: 50; 8.6 TABLET ORAL at 08:17

## 2022-09-22 RX ADMIN — ATORVASTATIN CALCIUM 10 MG: 20 TABLET, FILM COATED ORAL at 09:00

## 2022-09-22 RX ADMIN — OXYCODONE HYDROCHLORIDE 10 MG: 10 TABLET ORAL at 00:45

## 2022-09-22 RX ADMIN — CLONAZEPAM 1 MG: 1 TABLET ORAL at 08:17

## 2022-09-22 RX ADMIN — SODIUM CHLORIDE, PRESERVATIVE FREE 10 ML: 5 INJECTION INTRAVENOUS at 14:01

## 2022-09-22 RX ADMIN — ATENOLOL 25 MG: 25 TABLET ORAL at 08:17

## 2022-09-22 RX ADMIN — OXYCODONE HYDROCHLORIDE 10 MG: 10 TABLET ORAL at 04:41

## 2022-09-22 RX ADMIN — POLYETHYLENE GLYCOL 3350 17 G: 17 POWDER, FOR SOLUTION ORAL at 08:16

## 2022-09-22 RX ADMIN — ACETAMINOPHEN 1000 MG: 500 TABLET ORAL at 13:56

## 2022-09-22 NOTE — PROGRESS NOTES
PHYSICAL THERAPY TREATMENT  Patient: Parish Fiore (83 y.o. female)  Date: 9/22/2022  Diagnosis: Lumbar stenosis [M48.061]  Scoliosis of lumbar region due to degenerative disease of spine in adult [M41.86] <principal problem not specified>  Procedure(s) (LRB):  L2-3, L3-4, L4-5 LATERAL INTERBODY FUSION WITH CAGE, ALLOGRAFT, PERCUTANEOUS PEDICLE SCREW INSTRUMENTATION (N/A) 2 Days Post-Op  Precautions:    Chart, physical therapy assessment, plan of care and goals were reviewed. ASSESSMENT  Patient continues with skilled PT services and is progressing towards goals. Pt found sitting up in recliner chair upon arrival and agreeable to therapy. Pt needed Max A to scoot forwards in the chair to don LSO brace. Pt declined stair training so pt was verbally educated how to perform the stairs at home. Pt sit > stand CGA with RW and ambulated approximately 42ft into the hallway and back to the bathroom where pt completed pericare on her own. Pt ambulated CGA with RW approximately 8ft to sit EOB. Pt was re-educated on back precautions and log rolling to get OOB. Pt left sitting EOB with TORRES for their tx. Current Level of Function Impacting Discharge (mobility/balance): CGA / Max A x1 for mobility    Other factors to consider for discharge: PLOF, decreased mobility, safety          PLAN :  Patient continues to benefit from skilled intervention to address the above impairments. Continue treatment per established plan of care. to address goals. Recommendation for discharge: (in order for the patient to meet his/her long term goals)  Home with [address] [place] [address]    This discharge recommendation:  Has been made in collaboration with the attending provider and/or case management    IF patient discharges home will need the following DME: to be determined (TBD)       SUBJECTIVE:   Patient stated My pain is a 9 1/2 /10.     OBJECTIVE DATA SUMMARY:   Critical Behavior:  Neurologic State: Alert  Orientation Level: Oriented X4  Cognition: Follows commands     Functional Mobility Training:  Bed Mobility:  Scooting: Maximum assistance;Assist x1    Transfers:  Sit to Stand: Contact guard assistance  Stand to Sit: Contact guard assistance    Balance:  Sitting: Impaired; With support  Sitting - Static: Fair (occasional)  Sitting - Dynamic: Fair (occasional)  Standing: Impaired; With support  Standing - Static: Fair;Constant support  Standing - Dynamic : Fair;Constant support    Ambulation/Gait Training:  Distance (ft): 50 Feet (ft)  Assistive Device: Walker, rolling;Gait belt  Ambulation - Level of Assistance: Contact guard assistance    Stairs:   Pt refused to practice stair training, Pt educated on how to ascend and descend stairs at home     Pain Ratin 1/2 /10    Activity Tolerance:   Fair  Please refer to the flowsheet for vital signs taken during this treatment. After treatment patient left in no apparent distress:   Bed returned to lowest position and locked,  Sitting EOB with TORRES    COMMUNICATION/COLLABORATION:   The patients plan of care was discussed with: Registered nurse. SAJAN Stiles   Time Calculation: 24 mins    AMIRA Stiles, under direct supervision of Ge Domínguez PTA provided care and treatment of pt with verbal consent from pt received.        Problem: Mobility Impaired (Adult and Pediatric)  Goal: *Acute Goals and Plan of Care (Insert Text)  Description: Mod I with all transfers x7 days  Mod I with bed mob x7 days  Amb 100ft with RW and SBAx1 x7 days  Ascend/descend 4 steps with rails and CGAx1 x7 days    Pt stated goal: to go home  Outcome: Progressing Towards Goal

## 2022-09-22 NOTE — PROGRESS NOTES
OCCUPATIONAL THERAPY TREATMENT  Patient: Whitley Mae (50 y.o. female)  Date: 9/22/2022  Diagnosis: Lumbar stenosis [M48.061]  Scoliosis of lumbar region due to degenerative disease of spine in adult [M41.86] <principal problem not specified>  Procedure(s) (LRB):  L2-3, L3-4, L4-5 LATERAL INTERBODY FUSION WITH CAGE, ALLOGRAFT, PERCUTANEOUS PEDICLE SCREW INSTRUMENTATION (N/A) 2 Days Post-Op  Precautions:    Chart, occupational therapy assessment, plan of care, and goals were reviewed. ASSESSMENT  Patient continues with skilled OT services and is progressing towards goals. Upon TORRES arrival, pt ambulating from bathroom into room with PTA and agreeable to tx session. Pt completed transfer to EOB with Rajan. Pt completed seated rest break for ~8 minutes. Pt educated on use of reacher and long handled dressing stick for use after discharge, pt verbalized understanding. Pt completed sit>stand from EOB with CGA using RW for balance upon standing. Pt ambulated to sink in bathroom with CGA and completed standing oral hygiene and face washing with CGA/Rajan for cueing. Pt ambulated back to EOB with CGA and completed transfer with supine with ModA. Pt required MaxA x2 for scooted to [facility] and repositioned in bed. Pt set up with breakfast tray and left semi supine in bed with call bell within reach and needs met. Will continue to follow pt throughout remainder of stay and progress towards OT goals. Recommending [address] [place] [address] with family care at discharge when medically appropriate. Other factors to consider for discharge: family/social support, DME, time since onset, severity of deficits, decline from functional baseline         PLAN :  Patient continues to benefit from skilled intervention to address the above impairments. Continue treatment per established plan of care. to address goals.     Recommendation for discharge: (in order for the patient to meet his/her long term goals)  Home with [address] [place] [address] with family care    This discharge recommendation:  Has been made in collaboration with the attending provider and/or case management    IF patient discharges home will need the following DME: walker: rolling       SUBJECTIVE:   Patient stated I want to get back in bed.     OBJECTIVE DATA SUMMARY:   Cognitive/Behavioral Status:  Neurologic State: Drowsy  Orientation Level: Oriented X4  Cognition: Follows commands    Functional Mobility and Transfers for ADLs:  Bed Mobility:  Sit to Supine: Moderate assistance  Scooting: Minimum assistance    Transfers:  Sit to Stand: Contact guard assistance    Balance:  Sitting: Impaired; With support  Sitting - Static: Fair (occasional)  Sitting - Dynamic: Fair (occasional)  Standing: Impaired; With support  Standing - Static: Constant support; Fair  Standing - Dynamic : Constant support; Fair    ADL Intervention:  Grooming  Position Performed: Standing  Washing Face: Contact guard assistance;Minimum assistance  Brushing Teeth: Contact guard assistance;Minimum assistance    Upper Body Dressing Assistance  Orthotics(Brace): Minimum assistance (LSO)    Pain:  9-10/10 pain in back    Activity Tolerance:   Fair and requires rest breaks  Please refer to the flowsheet for vital signs taken during this treatment. After treatment patient left in no apparent distress:   Bed locked and in lowest position, Supine in bed and Call bell within reach    COMMUNICATION/COLLABORATION:   The patients plan of care was discussed with: Physical therapy assistant, Registered nurse, and Case management.      MELISSA Sargent  Time Calculation: 31 mins    Problem: Self Care Deficits Care Plan (Adult)  Goal: *Acute Goals and Plan of Care (Insert Text)  Description: Pt stated goal \"to be in less pain\"  Pt will be MI sup <> sit in prep for EOB ADLs  Pt will be MI grooming standing sink side LRAD  Pt will be MI UB dressing sitting EOB/long sit   Pt will be MI LE dressing sitting EOB/long sit  Pt will be MI sit <>  prep for toileting LRAD  Pt will be MI toileting/toilet transfer/cloth mgmt LRAD  Pt will be IND following UE HEP in prep for self care tasks      Outcome: Progressing Towards Goal

## 2022-09-22 NOTE — PROGRESS NOTES
Patient declined by [facility] and [facility] due to insurance type. CM contacted numerous [facility] in [place], [place], [place] and [place]. None provide Home health services nor DME in patient location. CM fax referral to [place] [address] [phone] for rolling walker. Patient declined due to location and insurance type. CM informed provider. CM also spoke with patient who states understanding. Patient will discharge home self care today. Medicare pt has received, reviewed, and signed 2nd IM letter informing them of their right to appeal the discharge. Signed copied has been placed on pt bedside chart.

## 2022-09-22 NOTE — DISCHARGE SUMMARY
Orthopedic progress note    Date:2022       Room:[facility]  Patient Donald Juarez     YOB: 1960     Age:62 y.o. Subjective    49-year-old female status post L2-L5 fusion. Postop day #2. Patient doing well. She still complains of moderate pain at her incision sites. She does feel her pain medication helps. She progressed well with therapy. Nursing reports patient has been ambulating on her own to the bathroom. No other complaints at this time. Objective           Vitals Last 24 Hours:  TEMPERATURE:  Temp  Av.4 °F (36.9 °C)  Min: 97.8 °F (36.6 °C)  Max: 98.8 °F (37.1 °C)  RESPIRATIONS RANGE: Resp  Av.7  Min: 16  Max: 20  PULSE OXIMETRY RANGE: SpO2  Av.2 %  Min: 92 %  Max: 98 %  PULSE RANGE: Pulse  Av.3  Min: 71  Max: 100  BLOOD PRESSURE RANGE: Systolic (14PHP), FUP:219 , Min:122 , YBT:948   ; Diastolic (42YYU), PIR:47, Min:72, Max:85    Current Facility-Administered Medications   Medication Dose Route Frequency    ketorolac (TORADOL) injection 30 mg  30 mg IntraVENous Q6H    acetaminophen (TYLENOL) tablet 1,000 mg  1,000 mg Oral Q6H    nitroglycerin (NITROSTAT) tablet 0.4 mg  0.4 mg SubLINGual Q5MIN PRN    albuterol (PROVENTIL HFA, VENTOLIN HFA, PROAIR HFA) inhaler 1 Puff  1 Puff Inhalation Q6H PRN    atenoloL (TENORMIN) tablet 25 mg  25 mg Oral 7am    cyclobenzaprine (FLEXERIL) tablet 7.5 mg  7.5 mg Oral BID    ondansetron (ZOFRAN ODT) tablet 4 mg  4 mg Oral Q6H PRN    levothyroxine (SYNTHROID) tablet 25 mcg  25 mcg Oral ACB    clonazePAM (KlonoPIN) tablet 1 mg  1 mg Oral BID    atorvastatin (LIPITOR) tablet 10 mg  10 mg Oral DAILY    tiotropium bromide (SPIRIVA RESPIMAT) 2.5 mcg /actuation  2 Puff Inhalation DAILY    . PHARMACY TO SUBSTITUTE PER PROTOCOL (Reordered from: rimegepant (NURTEC) 75 mg disintegrating tablet)    Per Protocol    sodium chloride (NS) flush 5-40 mL  5-40 mL IntraVENous Q8H    sodium chloride (NS) flush 5-40 mL  5-40 mL IntraVENous PRN naloxone (NARCAN) injection 0.4 mg  0.4 mg IntraVENous PRN    senna-docusate (PERICOLACE) 8.6-50 mg per tablet 1 Tablet  1 Tablet Oral BID    polyethylene glycol (MIRALAX) packet 17 g  17 g Oral DAILY    bisacodyL (DULCOLAX) suppository 10 mg  10 mg Rectal DAILY PRN    benzocaine-menthoL (CEPACOL) lozenge 1 Lozenge  1 Lozenge Oral PRN    oxyCODONE IR (ROXICODONE) tablet 5 mg  5 mg Oral Q4H PRN    oxyCODONE IR (ROXICODONE) tablet 10 mg  10 mg Oral Q4H PRN      Review of Systems   Constitutional: Negative for malaise/fatigue. Respiratory: Negative for cough, shortness of breath and wheezing. Cardiovascular: Negative for chest pain and palpitations. Gastrointestinal: Negative for abdominal pain, heartburn, nausea and vomiting. Neurological: Negative for headaches. Musculoskeletal: Denies any numbness/tingling of extremities    I/O (24Hr): Intake/Output Summary (Last 24 hours) at 9/22/2022 0802  Last data filed at 9/22/2022 0105  Gross per 24 hour   Intake 560 ml   Output --   Net 560 ml     Objective:  Vital signs: (most recent): Blood pressure 135/83, pulse 100, temperature 98.7 °F (37.1 °C), resp. rate 18, weight 65.7 kg (144 lb 13.5 oz), SpO2 92 %. Labs/Imaging/Diagnostics    Labs:  CBC:  Recent Labs     09/22/22  0619 09/21/22  0640   HGB 11.0* 10.8*     CHEMISTRIES:  Recent Labs     09/21/22  0640      K 4.1      CO2 27   BUN 9   CREA 0.61   CA 8.2*   PT/INR:No results for input(s): INR, INREXT in the last 72 hours. No lab exists for component: PROTIME  APTT:No results for input(s): APTT in the last 72 hours. LIVER PROFILE:No results for input(s): AST, ALT in the last 72 hours. No lab exists for component: Arianna [place], ALKPHOS  Lab Results   Component Value Date/Time    ALT (SGPT) 24 08/23/2018 12:35 PM    AST (SGOT) 20 08/23/2018 12:35 PM    Alk.  phosphatase 93 08/23/2018 12:35 PM    Bilirubin, total 0.2 08/23/2018 12:35 PM       Physical Exam:  Back: Incision sites are healing well. Mild ecchymosis seen along lateral incision site. Mild soft tissue seen in this area. There is mild tenderness palpation around this area. Mild tenderness palpation around the posterior incision site. No active drainage seen. Prineo dressing in place. Lower extremities: There was full range of motion of bilateral lower extremities without tenderness. Strength is 4 out of 5 bilaterally. Sensation sharp dull touch was intact throughout. No calf pain to palpation. EHL/DF/PF is 5 out of 5 bilaterally. Bilateral lower extremities appear neurovascularly intact. Assessment//Plan           Patient Active Problem List    Diagnosis Date Noted    Lumbar stenosis 09/20/2022    Scoliosis of lumbar region due to degenerative disease of spine in adult 09/20/2022    Chronic mastoiditis, right ear 03/28/2019    Acquired hypothyroidism 03/24/2017    Chest pain 05/02/2013    Palpitations 05/02/2013    Tympanic membrane rupture 03/20/2013    Long Q-T syndrome 03/20/2013    WPW (Fazal-Parkinson-White syndrome) 03/20/2013    Panic disorder with agoraphobia 03/20/2013    Generalized anxiety disorder 03/20/2013    Migraine 03/20/2013    Hyperlipidemia 03/20/2013    Osteoporosis 03/20/2013     Status post L2-L5 fusion. Postop day #2. Continue oral pain management. Toradol has been added. Will plan to discharge home today.       Electronically signed by Elena Torres PA-C on 9/22/2022 at 8:02 AM

## 2022-09-22 NOTE — ROUTINE PROCESS
[facility] bedside, verbal shift change report given to Marilee Leahy RN (oncoming nurse) by Crow Strange RN (offgoing nurse). Report included the following information from last nights events and SBAR.

## 2022-09-22 NOTE — ROUTINE PROCESS
Dr Georgette Smith notified of patients complaint of pain being 9.5/10. I explained she is getting 10mg Oxycodone every 4 hours. No new orders were given. Per Dr Georgette Smith, \"notify Dr Byron Soto this morning and let him  know what is going on. This is a decision for the primary doctor and nursing team.\"      56 - Spoke with Dr Byron Soto about patients stated pain level. He gave orders for Toradol 30mg IV q6h x4 doses. He also stated that BASIM Hadley Will be rounding on her today. Rachelmarques Trinh is on the floor now and is aware of this.

## 2022-09-22 NOTE — PROGRESS NOTES
Pt has a discharge order in for today. Pt is being discharged home self care and has been cleared by attending provider. Pt vital signs are stable and she shows no signs of distress. Pt is not being discharged with an IV, drain, or adamson. Discharge plan of care/case management plan validated with provider discharge order. Discharge instructions reviewed with patient. Pt stated she had no further questions. Prescriptions were sent to patient's preferred pharmacy and follow up appointments were made. Pt was wheeled down by staff and left in a private vehicle with her .

## (undated) DEVICE — TOWEL,OR,DSP,ST,BLUE,STD,2/PK,40PK/CS: Brand: MEDLINE

## (undated) DEVICE — Device

## (undated) DEVICE — CONTAINER,SPECIMEN,3OZ,OR STRL: Brand: MEDLINE

## (undated) DEVICE — GLOVE SURG SZ 85 L12IN FNGR THK79MIL GRN LTX FREE

## (undated) DEVICE — C-ARMOR C-ARM EQUIPMENT COVERS CLEAR STERILE UNIVERSAL FIT 12 PER CASE: Brand: C-ARMOR

## (undated) DEVICE — REM POLYHESIVE ADULT PATIENT RETURN ELECTRODE: Brand: VALLEYLAB

## (undated) DEVICE — #1020 STERI DRAPE 41MM X 41MM SMALL: Brand: STERI-DRAPE™

## (undated) DEVICE — SUT VCRL + 1 27IN OS6 VIO --

## (undated) DEVICE — GLOVE ORANGE PI 7 1/2   MSG9075

## (undated) DEVICE — 3M™ TEGADERM™ TRANSPARENT FILM DRESSING FRAME STYLE, 1626W, 4 IN X 4-3/4 IN (10 CM X 12 CM), 50/CT 4CT/CASE: Brand: 3M™ TEGADERM™

## (undated) DEVICE — GLOVE SURG SZ 75 L12IN FNGR THK79MIL GRN LTX FREE

## (undated) DEVICE — SUTURE VCRL + SZ 2-0 L27IN ABSRB UD CP-1 1/2 CIR REV CUT VCP266H

## (undated) DEVICE — SYR LR LCK 1ML GRAD NSAF 30ML --

## (undated) DEVICE — SUTURE VCRL SZ 5-0 L18IN ABSRB UD P-3 L13MM 3/8 CIR PRIM J493H

## (undated) DEVICE — 40418 TRENDELENBURG ONE-STEP ARM PROTECTORS LARGE (1 PAIR): Brand: 40418 TRENDELENBURG ONE-STEP ARM PROTECTORS LARGE (1 PAIR)

## (undated) DEVICE — INTENDED FOR TISSUE SEPARATION, AND OTHER PROCEDURES THAT REQUIRE A SHARP SURGICAL BLADE TO PUNCTURE OR CUT.: Brand: BARD-PARKER ® CARBON RIB-BACK BLADES

## (undated) DEVICE — 3.0MM ROUND FLUTED SOFT TOUCH

## (undated) DEVICE — ROCKER SWITCH PENCIL BLADE ELECTRODE, HOLSTER: Brand: EDGE

## (undated) DEVICE — ELECTRODE 8227410 PAIRED 2 CH SET ROHS

## (undated) DEVICE — 3M™ IOBAN™ 2 ANTIMICROBIAL INCISE DRAPE 6650EZ: Brand: IOBAN™ 2

## (undated) DEVICE — DRAPE,T,LAPARO,TRANS,STERILE: Brand: MEDLINE

## (undated) DEVICE — DRAPE EQUIP C ARM 74X42 IN MOB XR W/ TIE RUBBER BND LF

## (undated) DEVICE — DRAPE MICSCP W46XL120IN FOR ZEISS MD

## (undated) DEVICE — PREP SKN CHLRAPRP APL 26ML STR --

## (undated) DEVICE — NEEDLE HYPO 27GA L1.25IN GRY POLYPR HUB S STL REG BVL STR

## (undated) DEVICE — SUT MCRYL + 3-0 27IN PS1 UD --

## (undated) DEVICE — PROBE 9450015 NIM BALL TIP 23CM

## (undated) DEVICE — WIPE 400300 MEROCEL 20PK INSTRUMENT: Brand: MEROCEL®

## (undated) DEVICE — SOUTHSIDE TURNOVER: Brand: MEDLINE INDUSTRIES, INC.

## (undated) DEVICE — GUIDEWIRE SURG SHRP FOR MIS LAT SYS PIPELINE LS

## (undated) DEVICE — DEVON™ KNEE AND BODY STRAP 60" X 3" (1.5 M X 7.6 CM): Brand: DEVON

## (undated) DEVICE — DRAPE,REIN 53X77,STERILE: Brand: MEDLINE

## (undated) DEVICE — SURGICAL PROCEDURE PACK BASIN MAJ SET CUST NO CAUT

## (undated) DEVICE — CORD BPLR 12FT SGL USE CLR

## (undated) DEVICE — MAGNETIC INSTR DRAPE 20X16: Brand: MEDLINE INDUSTRIES, INC.

## (undated) DEVICE — SEALANT HEMOSTAT W/THROM 8ML -- SURGIFLO MATRIX

## (undated) DEVICE — BLADE OPHTH MINI BEAV SHRP --

## (undated) DEVICE — SOLUTION IV 1000ML 0.9% SOD CHL

## (undated) DEVICE — LAMINECTOMY ARM CRADLE FOAM POSITIONER: Brand: CARDINAL HEALTH

## (undated) DEVICE — (D)SUT PLN FST 6-0 18IN PC1 -- DISC BY MFR

## (undated) DEVICE — DRESSING 470530 SINUSSTENT 20PK PR KNNDY: Brand: MEROCEL®

## (undated) DEVICE — 3M™ DURAPORE™ SURGICAL TAPE 1538-3, 3 INCH X 10 YARD (7,5CM X 9,1M), 4 ROLLS/BOX: Brand: 3M™ DURAPORE™

## (undated) DEVICE — BASIC SINGLE BASIN-LF: Brand: MEDLINE INDUSTRIES, INC.

## (undated) DEVICE — COVER LT HNDL BLU PLAS

## (undated) DEVICE — INFECTION CONTROL KIT SYS

## (undated) DEVICE — STERILE POLYISOPRENE POWDER-FREE SURGICAL GLOVES WITH EMOLLIENT COATING: Brand: PROTEXIS

## (undated) DEVICE — 5.0MM ROUND FLUTED SOFT TOUCH

## (undated) DEVICE — TRAY PREP DRY W/ PREM GLV 2 APPL 6 SPNG 2 UNDPD 1 OVERWRAP

## (undated) DEVICE — PACKING MEROGEL OTOLOGIC 4X4CM -- 2PK

## (undated) DEVICE — SURGIFOAM SPNG SZ 100

## (undated) DEVICE — TIP SUCTION FLANGE YANKAUER FLX NO VENT FN CAP STRL

## (undated) DEVICE — TRAY URIN CATH PED 16FR BLLN 5CC INDWL STR TIP INF CTRL

## (undated) DEVICE — TAPE,CLOTH/SILK,CURAD,3"X10YD,LF,40/CS: Brand: CURAD

## (undated) DEVICE — SOLUTION IRRIG 1000ML 0.9% SOD CHL USP POUR PLAS BTL

## (undated) DEVICE — BLADE ELECTRODE: Brand: EDGE

## (undated) DEVICE — BLADE MYR 45DEG OFFSET S STL LANC TIP NAR SHFT DISP BEAV

## (undated) DEVICE — GLOVE SURG SZ 8 L12IN FNGR THK79MIL GRN LTX FREE

## (undated) DEVICE — DRESSING EAR AD 5.5IN GLSCOCK

## (undated) DEVICE — 3000CC GUARDIAN II: Brand: GUARDIAN

## (undated) DEVICE — TOWEL SURG W17XL27IN STD BLU COT NONFENESTRATED PREWASHED

## (undated) DEVICE — SYR IRR BLB 2OZ DISP BLU STRL -- CONVERT TO ITEM 357637

## (undated) DEVICE — DISPOSABLE IRRIGATION CASSETTE: Brand: CORE

## (undated) DEVICE — GARMENT,MEDLINE,DVT,INT,CALF,MED, GEN2: Brand: MEDLINE

## (undated) DEVICE — GOWN,SIRUS,NONRNF,SETINSLV,2XL,18/CS: Brand: MEDLINE

## (undated) DEVICE — ULNAR NERVE PROTECTOR FOAM POSITIONER: Brand: CARDINAL HEALTH

## (undated) DEVICE — INSULATED NEEDLE ELECTRODE: Brand: EDGE

## (undated) DEVICE — GOWN,PREVENTION PLUS,XLN/XL,ST,24/CS: Brand: MEDLINE

## (undated) DEVICE — 3M™ STERI-DRAPE™ INSTRUMENT POUCH 1018: Brand: STERI-DRAPE™

## (undated) DEVICE — COVER,TABLE,HEAVY DUTY,79"X110",STRL: Brand: MEDLINE

## (undated) DEVICE — SYSTEM SKIN CLSR 22CM DERMBND PRINEO

## (undated) DEVICE — SOLUTION IRRIG 1000ML H2O STRL BLT

## (undated) DEVICE — PACK,EENT,TURBAN DRAPE,PK II: Brand: MEDLINE

## (undated) DEVICE — STYLET SURG VIPER PRIM

## (undated) DEVICE — TIBURON UNIVERSAL SPINE DRAPE: Brand: CONVERTORS

## (undated) DEVICE — DRAPE SURG TOWEL SM 18X12 IN INVISISHIELD MP W/ ADH PLAS NS

## (undated) DEVICE — INSERT CUSH HDRST PRONE AD LG --

## (undated) DEVICE — SYR 5ML 1/5 GRAD LL NSAF LF --

## (undated) DEVICE — CHS NEURO PLUS 1: Brand: MEDLINE INDUSTRIES, INC.

## (undated) DEVICE — KENDALL SCD EXPRESS SLEEVES, KNEE LENGTH, MEDIUM: Brand: KENDALL SCD

## (undated) DEVICE — SUT SLK 5-0 18IN P3 BLK --

## (undated) DEVICE — BONE WAX WHITE: Brand: BONE WAX WHITE

## (undated) DEVICE — 4.0MM ROUND FLUTED SOFT TOUCH